# Patient Record
Sex: MALE | Race: BLACK OR AFRICAN AMERICAN | Employment: FULL TIME | URBAN - METROPOLITAN AREA
[De-identification: names, ages, dates, MRNs, and addresses within clinical notes are randomized per-mention and may not be internally consistent; named-entity substitution may affect disease eponyms.]

---

## 2017-02-19 ENCOUNTER — HOSPITAL ENCOUNTER (EMERGENCY)
Age: 27
Discharge: HOME | End: 2017-02-19
Attending: INTERNAL MEDICINE
Payer: SELF-PAY

## 2017-02-19 ENCOUNTER — APPOINTMENT (OUTPATIENT)
Dept: CT IMAGING | Age: 27
End: 2017-02-19
Attending: INTERNAL MEDICINE
Payer: SELF-PAY

## 2017-02-19 VITALS
BODY MASS INDEX: 35 KG/M2 | WEIGHT: 250 LBS | OXYGEN SATURATION: 100 % | TEMPERATURE: 97.1 F | DIASTOLIC BLOOD PRESSURE: 57 MMHG | RESPIRATION RATE: 18 BRPM | HEIGHT: 71 IN | SYSTOLIC BLOOD PRESSURE: 120 MMHG | HEART RATE: 86 BPM

## 2017-02-19 DIAGNOSIS — R10.84 ABDOMINAL PAIN, GENERALIZED: ICD-10-CM

## 2017-02-19 DIAGNOSIS — K29.90 GASTRITIS AND DUODENITIS: ICD-10-CM

## 2017-02-19 DIAGNOSIS — K52.9 GASTROENTERITIS: Primary | ICD-10-CM

## 2017-02-19 LAB
ALBUMIN SERPL BCP-MCNC: 4 G/DL (ref 3.4–5)
ALBUMIN/GLOB SERPL: 0.9 {RATIO} (ref 0.8–1.7)
ALP SERPL-CCNC: 55 U/L (ref 45–117)
ALT SERPL-CCNC: 31 U/L (ref 16–61)
AMPHET UR QL SCN: NEGATIVE
ANION GAP BLD CALC-SCNC: 13 MMOL/L (ref 3–18)
APPEARANCE UR: CLEAR
AST SERPL W P-5'-P-CCNC: 24 U/L (ref 15–37)
BACTERIA URNS QL MICRO: ABNORMAL /HPF
BARBITURATES UR QL SCN: NEGATIVE
BASOPHILS # BLD AUTO: 0 K/UL (ref 0–0.06)
BASOPHILS # BLD: 0 % (ref 0–2)
BENZODIAZ UR QL: NEGATIVE
BILIRUB SERPL-MCNC: 0.5 MG/DL (ref 0.2–1)
BILIRUB UR QL: NEGATIVE
BUN SERPL-MCNC: 8 MG/DL (ref 7–18)
BUN/CREAT SERPL: 8 (ref 12–20)
CALCIUM SERPL-MCNC: 8.7 MG/DL (ref 8.5–10.1)
CANNABINOIDS UR QL SCN: POSITIVE
CHLORIDE SERPL-SCNC: 104 MMOL/L (ref 100–108)
CO2 SERPL-SCNC: 24 MMOL/L (ref 21–32)
COCAINE UR QL SCN: NEGATIVE
COLOR UR: YELLOW
CREAT SERPL-MCNC: 1.05 MG/DL (ref 0.6–1.3)
DIFFERENTIAL METHOD BLD: NORMAL
EOSINOPHIL # BLD: 0 K/UL (ref 0–0.4)
EOSINOPHIL NFR BLD: 0 % (ref 0–5)
EPITH CASTS URNS QL MICRO: NEGATIVE /LPF (ref 0–5)
ERYTHROCYTE [DISTWIDTH] IN BLOOD BY AUTOMATED COUNT: 13.4 % (ref 11.6–14.5)
ETHANOL SERPL-MCNC: 19 MG/DL (ref 0–3)
GLOBULIN SER CALC-MCNC: 4.5 G/DL (ref 2–4)
GLUCOSE SERPL-MCNC: 124 MG/DL (ref 74–99)
GLUCOSE UR STRIP.AUTO-MCNC: NEGATIVE MG/DL
HCT VFR BLD AUTO: 43.6 % (ref 36–48)
HDSCOM,HDSCOM: ABNORMAL
HGB BLD-MCNC: 14.8 G/DL (ref 13–16)
HGB UR QL STRIP: NEGATIVE
KETONES UR QL STRIP.AUTO: NEGATIVE MG/DL
LEUKOCYTE ESTERASE UR QL STRIP.AUTO: NEGATIVE
LIPASE SERPL-CCNC: 129 U/L (ref 73–393)
LYMPHOCYTES # BLD AUTO: 27 % (ref 21–52)
LYMPHOCYTES # BLD: 1.7 K/UL (ref 0.9–3.6)
MCH RBC QN AUTO: 28.6 PG (ref 24–34)
MCHC RBC AUTO-ENTMCNC: 33.9 G/DL (ref 31–37)
MCV RBC AUTO: 84.2 FL (ref 74–97)
METHADONE UR QL: NEGATIVE
MONOCYTES # BLD: 0.5 K/UL (ref 0.05–1.2)
MONOCYTES NFR BLD AUTO: 8 % (ref 3–10)
NEUTS SEG # BLD: 4 K/UL (ref 1.8–8)
NEUTS SEG NFR BLD AUTO: 65 % (ref 40–73)
NITRITE UR QL STRIP.AUTO: NEGATIVE
OPIATES UR QL: NEGATIVE
PCP UR QL: NEGATIVE
PH UR STRIP: >8.5 [PH] (ref 5–8)
PLATELET # BLD AUTO: 264 K/UL (ref 135–420)
PMV BLD AUTO: 10.8 FL (ref 9.2–11.8)
POTASSIUM SERPL-SCNC: 3.7 MMOL/L (ref 3.5–5.5)
PROT SERPL-MCNC: 8.5 G/DL (ref 6.4–8.2)
PROT UR STRIP-MCNC: 30 MG/DL
RBC # BLD AUTO: 5.18 M/UL (ref 4.7–5.5)
RBC #/AREA URNS HPF: ABNORMAL /HPF (ref 0–5)
SODIUM SERPL-SCNC: 141 MMOL/L (ref 136–145)
SP GR UR REFRACTOMETRY: 1.02 (ref 1–1.03)
UROBILINOGEN UR QL STRIP.AUTO: 1 EU/DL (ref 0.2–1)
WBC # BLD AUTO: 6.2 K/UL (ref 4.6–13.2)
WBC URNS QL MICRO: ABNORMAL /HPF (ref 0–5)

## 2017-02-19 PROCEDURE — C9113 INJ PANTOPRAZOLE SODIUM, VIA: HCPCS | Performed by: INTERNAL MEDICINE

## 2017-02-19 PROCEDURE — 74011250636 HC RX REV CODE- 250/636: Performed by: INTERNAL MEDICINE

## 2017-02-19 PROCEDURE — 74177 CT ABD & PELVIS W/CONTRAST: CPT

## 2017-02-19 PROCEDURE — 96361 HYDRATE IV INFUSION ADD-ON: CPT

## 2017-02-19 PROCEDURE — 81001 URINALYSIS AUTO W/SCOPE: CPT | Performed by: INTERNAL MEDICINE

## 2017-02-19 PROCEDURE — 96365 THER/PROPH/DIAG IV INF INIT: CPT

## 2017-02-19 PROCEDURE — 99283 EMERGENCY DEPT VISIT LOW MDM: CPT

## 2017-02-19 PROCEDURE — 96375 TX/PRO/DX INJ NEW DRUG ADDON: CPT

## 2017-02-19 PROCEDURE — 85025 COMPLETE CBC W/AUTO DIFF WBC: CPT | Performed by: INTERNAL MEDICINE

## 2017-02-19 PROCEDURE — 80307 DRUG TEST PRSMV CHEM ANLYZR: CPT | Performed by: INTERNAL MEDICINE

## 2017-02-19 PROCEDURE — 80053 COMPREHEN METABOLIC PANEL: CPT | Performed by: INTERNAL MEDICINE

## 2017-02-19 PROCEDURE — 83690 ASSAY OF LIPASE: CPT | Performed by: INTERNAL MEDICINE

## 2017-02-19 PROCEDURE — 74011636320 HC RX REV CODE- 636/320: Performed by: INTERNAL MEDICINE

## 2017-02-19 PROCEDURE — 74011000258 HC RX REV CODE- 258: Performed by: INTERNAL MEDICINE

## 2017-02-19 RX ORDER — ONDANSETRON 2 MG/ML
4 INJECTION INTRAMUSCULAR; INTRAVENOUS
Status: COMPLETED | OUTPATIENT
Start: 2017-02-19 | End: 2017-02-19

## 2017-02-19 RX ORDER — PANTOPRAZOLE SODIUM 40 MG/1
40 TABLET, DELAYED RELEASE ORAL DAILY
Qty: 20 TAB | Refills: 0 | Status: SHIPPED | OUTPATIENT
Start: 2017-02-19 | End: 2017-03-11

## 2017-02-19 RX ORDER — PROMETHAZINE HYDROCHLORIDE 25 MG/1
25 TABLET ORAL
Qty: 12 TAB | Refills: 0 | Status: SHIPPED | OUTPATIENT
Start: 2017-02-19 | End: 2017-03-30

## 2017-02-19 RX ORDER — ACETAMINOPHEN 10 MG/ML
1000 INJECTION, SOLUTION INTRAVENOUS
Status: COMPLETED | OUTPATIENT
Start: 2017-02-19 | End: 2017-02-19

## 2017-02-19 RX ORDER — PANTOPRAZOLE SODIUM 40 MG/10ML
40 INJECTION, POWDER, LYOPHILIZED, FOR SOLUTION INTRAVENOUS
Status: COMPLETED | OUTPATIENT
Start: 2017-02-19 | End: 2017-02-19

## 2017-02-19 RX ADMIN — PANTOPRAZOLE SODIUM 40 MG: 40 INJECTION, POWDER, FOR SOLUTION INTRAVENOUS at 09:38

## 2017-02-19 RX ADMIN — ONDANSETRON HYDROCHLORIDE 4 MG: 2 INJECTION INTRAMUSCULAR; INTRAVENOUS at 08:36

## 2017-02-19 RX ADMIN — ACETAMINOPHEN 1000 MG: 10 INJECTION, SOLUTION INTRAVENOUS at 09:38

## 2017-02-19 RX ADMIN — IOPAMIDOL 100 ML: 612 INJECTION, SOLUTION INTRAVENOUS at 09:32

## 2017-02-19 RX ADMIN — SODIUM CHLORIDE 1000 ML: 900 INJECTION, SOLUTION INTRAVENOUS at 08:30

## 2017-02-19 RX ADMIN — PROMETHAZINE HYDROCHLORIDE 12.5 MG: 25 INJECTION, SOLUTION INTRAMUSCULAR; INTRAVENOUS at 09:39

## 2017-02-19 NOTE — ED PROVIDER NOTES
[address]  EMERGENCY DEPARTMENT HISTORY AND PHYSICAL EXAM       Date: 2/19/2017   Patient Name: Nelson Abdalla   YOB: 1990  Medical Record Number: [id number]    History of Presenting Illness     Chief Complaint   Patient presents with    Vomiting    Abdominal Pain    Diarrhea        History Provided By:  patient    Additional History:   8:52 AM   Nelson Abdalla is a 31 y.o. male h/o abdominal pain, gastritis, who presents to the emergency department C/O abd pain, almost diffuse, w/o radiation, severe,  onset 6 hours ago. Associated sx's include back pain, congestion, nausea, vomiting and bright-red bloody tinged diarrhea for last few days. Pt vomited 17x this AM. Pt's vomit consist of mostly mucus. Pt consumed 2-3 shots of alcohol last night. Pt has not had colostomy done. Pt denies CP, f/c, back pain, ha, dyspnea, smoking cigarettes, use of recreational drugs, any surgeries and allergies. Primary Care Provider: None   Specialist:    Past History     Past Medical History:   Past Medical History   Diagnosis Date    Asthma         Past Surgical History:   History reviewed. No pertinent past surgical history. Family History:   History reviewed. No pertinent family history. Social History:   Social History   Substance Use Topics    Smoking status: Never Smoker    Smokeless tobacco: None    Alcohol use Yes      Comment: socially         Allergies:   No Known Allergies     Review of Systems   Review of Systems   HENT: Positive for congestion. Cardiovascular: Negative for chest pain. Gastrointestinal: Positive for abdominal pain, blood in stool, nausea and vomiting. Musculoskeletal: Positive for back pain. All other systems reviewed and are negative.       Physical Exam  Vitals:    02/19/17 0809   BP: 120/57   Pulse: 86   Resp: 18   Temp: 97.1 °F (36.2 °C)   SpO2: 100%   Weight: 113.4 kg (250 lb)   Height: 5' 11\" (1.803 m)       Physical Exam Constitutional: He is oriented to person, place, and time. He appears well-developed and well-nourished. HENT:   Head: Normocephalic and atraumatic. Right Ear: External ear normal.   Left Ear: External ear normal.   Nose: Nose normal.   Mouth/Throat: Oropharyngeal exudate (minimal) and posterior oropharyngeal erythema (mild) present. Nasal erythema   Eyes: Conjunctivae and EOM are normal. Pupils are equal, round, and reactive to light. Neck: Normal range of motion. Neck supple. No JVD present. No tracheal deviation present. No thyromegaly present. Cardiovascular: Normal rate, regular rhythm, normal heart sounds and intact distal pulses. Pulmonary/Chest: Effort normal and breath sounds normal.   Abdominal: Soft. Bowel sounds are normal. He exhibits no distension and no mass. There is tenderness in the right upper quadrant, right lower quadrant and epigastric area. There is no rebound and no guarding. No HSM   Musculoskeletal: Normal range of motion. He exhibits no edema or tenderness. Lymphadenopathy:     He has no cervical adenopathy. Neurological: He is alert and oriented to person, place, and time. He has normal reflexes. No cranial nerve deficit. He exhibits normal muscle tone. Coordination normal.   No focal weakness   Skin: Skin is warm and dry. Psychiatric: He has a normal mood and affect. His behavior is normal. Thought content normal.   Nursing note and vitals reviewed.     Diagnostic Study Results     Labs -      Recent Results (from the past 12 hour(s))   URINALYSIS W/ RFLX MICROSCOPIC    Collection Time: 02/19/17  8:15 AM   Result Value Ref Range    Color YELLOW      Appearance CLEAR      Specific gravity 1.022 1.005 - 1.030      pH (UA) >8.5 (H) 5.0 - 8.0    Protein 30 (A) NEG mg/dL    Glucose NEGATIVE  NEG mg/dL    Ketone NEGATIVE  NEG mg/dL    Bilirubin NEGATIVE  NEG      Blood NEGATIVE  NEG      Urobilinogen 1.0 0.2 - 1.0 EU/dL    Nitrites NEGATIVE  NEG      Leukocyte Esterase NEGATIVE  NEG     URINE MICROSCOPIC ONLY    Collection Time: 02/19/17  8:15 AM   Result Value Ref Range    WBC 0 to 3 0 - 5 /hpf    RBC 0 to 3 0 - 5 /hpf    Epithelial cells NEGATIVE  0 - 5 /lpf    Bacteria FEW (A) NEG /hpf   DRUG SCREEN, URINE    Collection Time: 02/19/17  8:15 AM   Result Value Ref Range    BENZODIAZEPINE NEGATIVE  NEG      BARBITURATES NEGATIVE  NEG      THC (TH-CANNABINOL) POSITIVE (A) NEG      OPIATES NEGATIVE  NEG      PCP(PHENCYCLIDINE) NEGATIVE  NEG      COCAINE NEGATIVE  NEG      AMPHETAMINE NEGATIVE  NEG      METHADONE NEGATIVE  NEG      HDSCOM (NOTE)    CBC WITH AUTOMATED DIFF    Collection Time: 02/19/17  8:20 AM   Result Value Ref Range    WBC 6.2 4.6 - 13.2 K/uL    RBC 5.18 4.70 - 5.50 M/uL    HGB 14.8 13.0 - 16.0 g/dL    HCT 43.6 36.0 - 48.0 %    MCV 84.2 74.0 - 97.0 FL    MCH 28.6 24.0 - 34.0 PG    MCHC 33.9 31.0 - 37.0 g/dL    RDW 13.4 11.6 - 14.5 %    PLATELET 886 599 - 862 K/uL    MPV 10.8 9.2 - 11.8 FL    NEUTROPHILS 65 40 - 73 %    LYMPHOCYTES 27 21 - 52 %    MONOCYTES 8 3 - 10 %    EOSINOPHILS 0 0 - 5 %    BASOPHILS 0 0 - 2 %    ABS. NEUTROPHILS 4.0 1.8 - 8.0 K/UL    ABS. LYMPHOCYTES 1.7 0.9 - 3.6 K/UL    ABS. MONOCYTES 0.5 0.05 - 1.2 K/UL    ABS. EOSINOPHILS 0.0 0.0 - 0.4 K/UL    ABS. BASOPHILS 0.0 0.0 - 0.06 K/UL    DF AUTOMATED     METABOLIC PANEL, COMPREHENSIVE    Collection Time: 02/19/17  8:20 AM   Result Value Ref Range    Sodium 141 136 - 145 mmol/L    Potassium 3.7 3.5 - 5.5 mmol/L    Chloride 104 100 - 108 mmol/L    CO2 24 21 - 32 mmol/L    Anion gap 13 3.0 - 18 mmol/L    Glucose 124 (H) 74 - 99 mg/dL    BUN 8 7.0 - 18 MG/DL    Creatinine 1.05 0.6 - 1.3 MG/DL    BUN/Creatinine ratio 8 (L) 12 - 20      GFR est AA >60 >60 ml/min/1.73m2    GFR est non-AA >60 >60 ml/min/1.73m2    Calcium 8.7 8.5 - 10.1 MG/DL    Bilirubin, total 0.5 0.2 - 1.0 MG/DL    ALT (SGPT) 31 16 - 61 U/L    AST (SGOT) 24 15 - 37 U/L    Alk.  phosphatase 55 45 - 117 U/L    Protein, total 8.5 (H) 6.4 - 8.2 g/dL    Albumin 4.0 3.4 - 5.0 g/dL    Globulin 4.5 (H) 2.0 - 4.0 g/dL    A-G Ratio 0.9 0.8 - 1.7     ETHYL ALCOHOL    Collection Time: 02/19/17  8:20 AM   Result Value Ref Range    ALCOHOL(ETHYL),SERUM 19 (H) 0 - 3 MG/DL   LIPASE    Collection Time: 02/19/17  8:45 AM   Result Value Ref Range    Lipase 129 73 - 393 U/L       Radiologic Studies -  CT ABD PELV W CONT   Final Result   No acute findings within the abdomen or pelvis to explain the patient's  abdominal pain  As read by the radiologist.         Medical Decision Making   I am the first provider for this patient. I reviewed the vital signs, available nursing notes, past medical history, past surgical history, family history and social history. Vital Signs-Reviewed the patient's vital signs. Patient Vitals for the past 12 hrs:   Temp Pulse Resp BP SpO2   02/19/17 0809 97.1 °F (36.2 °C) 86 18 120/57 100 %       Pulse Oximetry Analysis - Normal 100% on RA     Medications Given in the ED:  Medications   ondansetron (ZOFRAN) injection 4 mg (4 mg IntraVENous Given 2/19/17 0836)   sodium chloride 0.9 % bolus infusion 1,000 mL (0 mL IntraVENous IV Completed 2/19/17 0939)   promethazine (PHENERGAN) 12.5 mg in 0.9% sodium chloride 50 mL IVPB (0 mg IntraVENous IV Completed 2/19/17 1023)   acetaminophen (OFIRMEV) infusion 1,000 mg (1,000 mg IntraVENous Given 2/19/17 0938)   pantoprazole (PROTONIX) injection 40 mg (40 mg IntraVENous Given 2/19/17 0938)   iopamidol (ISOVUE 300) 61 % contrast injection  mL (100 mL IntraVENous Given 2/19/17 0932)        PROGRESS NOTE:  8:52 AM   Initial assessment performed. Pt denies rectal exam.     Discharge Note:  10:47 AM   Pt has been reexamined. Patient has no new complaints, changes; physical findings shows improvement with resting comfortably, no pain, abd soft, nt, nd, nabs, no guarding; Heart/lungs no change wnl. Care plan outlined and precautions discussed. Results were reviewed with the patient.  All medications were reviewed with the patient; will d/c home with phenergan and protonix . All of pt's questions and concerns were addressed. Patient was instructed and agrees to follow up with GI, as well as to return to the ED upon further deterioration. Discussed cessation of alcohol. Patient is ready to go home. Diagnosis   Clinical Impression:   1. Gastroenteritis    2. Gastritis and duodenitis    3. Abdominal pain, generalized         Discussion:    Follow-up Information     Follow up With Details Comments Contact Info    Lorene Vallecillo MD Schedule an appointment as soon as possible for a visit in 2 days  [address] [place] [address] [address] [facility] EMERGENCY DEPT  As needed, If symptoms worsen [address]  [phone]          Current Discharge Medication List      START taking these medications    Details   promethazine (PHENERGAN) 25 mg tablet Take 1 Tab by mouth every six (6) hours as needed. Qty: 12 Tab, Refills: 0      pantoprazole (PROTONIX) 40 mg tablet Take 1 Tab by mouth daily for 20 days. Qty: 20 Tab, Refills: 0             _______________________________   Attestations: This note is prepared by Beena West , acting as a Scribe for Werner Balderas MD  on 8:51 AM on 2/19/2017 . Werner Balderas MD : The scribe's documentation has been prepared under my direction and personally reviewed by me in its entirety.   _______________________________

## 2017-02-19 NOTE — DISCHARGE INSTRUCTIONS
Abdominal Pain: Care Instructions  Your Care Instructions    Abdominal pain has many possible causes. Some aren't serious and get better on their own in a few days. Others need more testing and treatment. If your pain continues or gets worse, you need to be rechecked and may need more tests to find out what is wrong. You may need surgery to correct the problem. Don't ignore new symptoms, such as fever, nausea and vomiting, urination problems, pain that gets worse, and dizziness. These may be signs of a more serious problem. Your doctor may have recommended a follow-up visit in the next 8 to 12 hours. If you are not getting better, you may need more tests or treatment. The doctor has checked you carefully, but problems can develop later. If you notice any problems or new symptoms, get medical treatment right away. Follow-up care is a key part of your treatment and safety. Be sure to make and go to all appointments, and call your doctor if you are having problems. It's also a good idea to know your test results and keep a list of the medicines you take. How can you care for yourself at home? · Rest until you feel better. · To prevent dehydration, drink plenty of fluids, enough so that your urine is light yellow or clear like water. Choose water and other caffeine-free clear liquids until you feel better. If you have kidney, heart, or liver disease and have to limit fluids, talk with your doctor before you increase the amount of fluids you drink. · If your stomach is upset, eat mild foods, such as rice, dry toast or crackers, bananas, and applesauce. Try eating several small meals instead of two or three large ones. · Wait until 48 hours after all symptoms have gone away before you have spicy foods, alcohol, and drinks that contain caffeine. · Do not eat foods that are high in fat. · Avoid anti-inflammatory medicines such as aspirin, ibuprofen (Advil, Motrin), and naproxen (Aleve).  These can cause stomach upset. Talk to your doctor if you take daily aspirin for another health problem. When should you call for help? Call 911 anytime you think you may need emergency care. For example, call if:  · You passed out (lost consciousness). · You pass maroon or very bloody stools. · You vomit blood or what looks like coffee grounds. · You have new, severe belly pain. Call your doctor now or seek immediate medical care if:  · Your pain gets worse, especially if it becomes focused in one area of your belly. · You have a new or higher fever. · Your stools are black and look like tar, or they have streaks of blood. · You have unexpected vaginal bleeding. · You have symptoms of a urinary tract infection. These may include:  ¨ Pain when you urinate. ¨ Urinating more often than usual.  ¨ Blood in your urine. · You are dizzy or lightheaded, or you feel like you may faint. Watch closely for changes in your health, and be sure to contact your doctor if:  · You are not getting better after 1 day (24 hours). Where can you learn more? Go to http://glennNetgenmayra.info/. Enter B640 in the search box to learn more about \"Abdominal Pain: Care Instructions. \"  Current as of: May 27, 2016  Content Version: 11.1  © 1608-8973 [employer]. Care instructions adapted under license by [employer] (which disclaims liability or warranty for this information). If you have questions about a medical condition or this instruction, always ask your healthcare professional. [address] any warranty or liability for your use of this information. Gastritis: Care Instructions  Your Care Instructions    Gastritis is a sore and upset stomach. It happens when something irritates the stomach lining. Many things can cause it. These include an infection such as the flu or something you ate or drank. Medicines or a sore on the lining of the stomach (ulcer) also can cause it. Your belly may bloat and ache. You may belch, vomit, and feel sick to your stomach. You should be able to relieve the problem by taking medicine. And it may help to change your diet. If gastritis lasts, your doctor may prescribe medicine. Follow-up care is a key part of your treatment and safety. Be sure to make and go to all appointments, and call your doctor if you are having problems. It's also a good idea to know your test results and keep a list of the medicines you take. How can you care for yourself at home? · If your doctor prescribed antibiotics, take them as directed. Do not stop taking them just because you feel better. You need to take the full course of antibiotics. · Be safe with medicines. If your doctor prescribed medicine to decrease stomach acid, take it as directed. Call your doctor if you think you are having a problem with your medicine. · Do not take any other medicine, including over-the-counter pain relievers, without talking to your doctor first.  · If your doctor recommends over-the-counter medicine to reduce stomach acid, such as Pepcid AC, Prilosec, Tagamet HB, or Zantac 75, follow the directions on the label. · Drink plenty of fluids (enough so that your urine is light yellow or clear like water) to prevent dehydration. Choose water and other caffeine-free clear liquids. If you have kidney, heart, or liver disease and have to limit fluids, talk with your doctor before you increase the amount of fluids you drink. · Limit how much alcohol you drink. · Avoid coffee, tea, cola drinks, chocolate, and other foods with caffeine. They increase stomach acid. When should you call for help? Call 911 anytime you think you may need emergency care. For example, call if:  · You vomit blood or what looks like coffee grounds. · You pass maroon or very bloody stools.   Call your doctor now or seek immediate medical care if:  · You start breathing fast and have not produced urine in the last 8 hours.  · You cannot keep fluids down. Watch closely for changes in your health, and be sure to contact your doctor if:  · You do not get better as expected. Where can you learn more? Go to http://glenn-mayra.info/. Enter [id number] in the search box to learn more about \"Gastritis: Care Instructions. \"  Current as of: August 9, 2016  Content Version: 11.1  © 2019-3639 [employer]. Care instructions adapted under license by [employer] (which disclaims liability or warranty for this information). If you have questions about a medical condition or this instruction, always ask your healthcare professional. [place] [address] any warranty or liability for your use of this information. Gastroenteritis: Care Instructions  Your Care Instructions  Gastroenteritis is an illness that may cause nausea, vomiting, and diarrhea. It is sometimes called \"stomach flu. \" It can be caused by bacteria or a virus. You will probably begin to feel better in 1 to 2 days. In the meantime, get plenty of rest and make sure you do not become dehydrated. Dehydration occurs when your body loses too much fluid. Follow-up care is a key part of your treatment and safety. Be sure to make and go to all appointments, and call your doctor if you are having problems. Its also a good idea to know your test results and keep a list of the medicines you take. How can you care for yourself at home? · If your doctor prescribed antibiotics, take them as directed. Do not stop taking them just because you feel better. You need to take the full course of antibiotics. · Drink plenty of fluids to prevent dehydration, enough so that your urine is light yellow or clear like water. Choose water and other caffeine-free clear liquids until you feel better. If you have kidney, heart, or liver disease and have to limit fluids, talk with your doctor before you increase your fluid intake.   · Drink fluids slowly, in frequent, small amounts, because drinking too much too fast can cause vomiting. · Begin eating mild foods, such as dry toast, yogurt, applesauce, bananas, and rice. Avoid spicy, hot, or high-fat foods, and do not drink alcohol or caffeine for a day or two. Do not drink milk or eat ice cream until you are feeling better. How to prevent gastroenteritis  · Keep hot foods hot and cold foods cold. · Do not eat meats, dressings, salads, or other foods that have been kept at room temperature for more than 2 hours. · Use a thermometer to check your refrigerator. It should be between 34°F and 40°F.  · Defrost meats in the refrigerator or microwave, not on the kitchen counter. · Keep your hands and your kitchen clean. Wash your hands, cutting boards, and countertops with hot soapy water frequently. · Cook meat until it is well done. · Do not eat raw eggs or uncooked sauces made with raw eggs. · Do not take chances. If food looks or tastes spoiled, throw it out. When should you call for help? Call 911 anytime you think you may need emergency care. For example, call if:  · You vomit blood or what looks like coffee grounds. · You passed out (lost consciousness). · You pass maroon or very bloody stools. Call your doctor now or seek immediate medical care if:  · You have severe belly pain. · You have signs of needing more fluids. You have sunken eyes, a dry mouth, and pass only a little dark urine. · You feel like you are going to faint. · You have increased belly pain that does not go away in 1 to 2 days. · You have new or increased nausea, or you are vomiting. · You have a new or higher fever. · Your stools are black and tarlike or have streaks of blood. Watch closely for changes in your health, and be sure to contact your doctor if:  · You are dizzy or lightheaded. · You urinate less than usual, or your urine is dark yellow or brown. · You do not feel better with each day that goes by.   Where can you learn more? Go to http://glenn-mayra.info/. Enter N142 in the search box to learn more about \"Gastroenteritis: Care Instructions. \"  Current as of: May 24, 2016  Content Version: 11.1  © 4767-3906 [employer]. Care instructions adapted under license by [employer] (which disclaims liability or warranty for this information). If you have questions about a medical condition or this instruction, always ask your healthcare professional. [place] [address] any warranty or liability for your use of this information.

## 2017-02-19 NOTE — ED TRIAGE NOTES
Pt states n/v/d onset today with abd pain, pt states unable to hold any fluids down  Sepsis Screening completed    (  )Patient meets SIRS criteria. ( x )Patient does not meet SIRS criteria.       SIRS Criteria is achieved when two or more of the following are present   Temperature < 96.8°F (36°C) or > 100.9°F (38.3°C)   Heart Rate > 90 beats per minute   Respiratory Rate > 20 breaths per minute   WBC count > 12,000 or <4,000 or > 10% bands

## 2017-03-30 ENCOUNTER — HOSPITAL ENCOUNTER (EMERGENCY)
Age: 27
Discharge: HOME | End: 2017-03-30
Attending: EMERGENCY MEDICINE
Payer: SELF-PAY

## 2017-03-30 VITALS
RESPIRATION RATE: 20 BRPM | HEIGHT: 71 IN | WEIGHT: 250 LBS | BODY MASS INDEX: 35 KG/M2 | OXYGEN SATURATION: 100 % | HEART RATE: 98 BPM | TEMPERATURE: 97.9 F | DIASTOLIC BLOOD PRESSURE: 84 MMHG | SYSTOLIC BLOOD PRESSURE: 167 MMHG

## 2017-03-30 DIAGNOSIS — R80.9 PROTEINURIA: ICD-10-CM

## 2017-03-30 DIAGNOSIS — R36.9 PENILE DISCHARGE: Primary | ICD-10-CM

## 2017-03-30 LAB
APPEARANCE UR: CLEAR
BACTERIA URNS QL MICRO: NEGATIVE /HPF
BILIRUB UR QL: NEGATIVE
COLOR UR: YELLOW
EPITH CASTS URNS QL MICRO: ABNORMAL /LPF (ref 0–5)
GLUCOSE UR STRIP.AUTO-MCNC: NEGATIVE MG/DL
HGB UR QL STRIP: ABNORMAL
KETONES UR QL STRIP.AUTO: ABNORMAL MG/DL
LEUKOCYTE ESTERASE UR QL STRIP.AUTO: NEGATIVE
MUCOUS THREADS URNS QL MICRO: ABNORMAL /LPF
NITRITE UR QL STRIP.AUTO: NEGATIVE
PH UR STRIP: 6.5 [PH] (ref 5–8)
PROT UR STRIP-MCNC: 30 MG/DL
RBC #/AREA URNS HPF: ABNORMAL /HPF (ref 0–5)
SP GR UR REFRACTOMETRY: >1.03 (ref 1–1.03)
UROBILINOGEN UR QL STRIP.AUTO: 1 EU/DL (ref 0.2–1)
WBC URNS QL MICRO: ABNORMAL /HPF (ref 0–5)

## 2017-03-30 PROCEDURE — 87491 CHLMYD TRACH DNA AMP PROBE: CPT | Performed by: PHYSICIAN ASSISTANT

## 2017-03-30 PROCEDURE — 96372 THER/PROPH/DIAG INJ SC/IM: CPT

## 2017-03-30 PROCEDURE — 74011250636 HC RX REV CODE- 250/636: Performed by: PHYSICIAN ASSISTANT

## 2017-03-30 PROCEDURE — 99283 EMERGENCY DEPT VISIT LOW MDM: CPT

## 2017-03-30 PROCEDURE — 81001 URINALYSIS AUTO W/SCOPE: CPT | Performed by: EMERGENCY MEDICINE

## 2017-03-30 PROCEDURE — 74011250637 HC RX REV CODE- 250/637: Performed by: PHYSICIAN ASSISTANT

## 2017-03-30 PROCEDURE — 74011000250 HC RX REV CODE- 250: Performed by: PHYSICIAN ASSISTANT

## 2017-03-30 RX ORDER — AZITHROMYCIN 250 MG/1
1000 TABLET, FILM COATED ORAL
Status: COMPLETED | OUTPATIENT
Start: 2017-03-30 | End: 2017-03-30

## 2017-03-30 RX ORDER — AZITHROMYCIN 250 MG/1
1000 TABLET, FILM COATED ORAL DAILY
Status: DISCONTINUED | OUTPATIENT
Start: 2017-03-31 | End: 2017-03-30

## 2017-03-30 RX ADMIN — LIDOCAINE HYDROCHLORIDE 250 MG: 10 INJECTION, SOLUTION INFILTRATION; PERINEURAL at 18:10

## 2017-03-30 RX ADMIN — AZITHROMYCIN 1000 MG: 250 TABLET, FILM COATED ORAL at 18:09

## 2017-03-30 NOTE — ED PROVIDER NOTES
HPI Comments: 5:45 PM    Jody Miller is a 31 y.o. male presenting to the ED C/O penile discharge that began today ~4 hours ago. Pt has no pain. Associated Sx include abdominal pain. Pt reports two recent episodes of condoms breaking, but has not been notified by his sexual partners of STDs. Pt denies dysuria, testicular swelling or pain, back pain, fever, and any other symptoms or complaints. Written by BRUNILDA Guadalupe, as dictated by Arnaldo Trotter PA-C         Patient is a 31 y.o. male presenting with penile problem. The history is provided by the patient. No  was used. Penis Injury   This is a new problem. The current episode started 3 to 5 hours ago. Primary symptoms include penile discharge. Pertinent negatives include no dysuria, no penile pain and no scrotal pain. The discharge is white. Associated symptoms include abdominal pain. There has been no fever. Sexual activity: sexually active. Condom Use: Reports condoms broke   It is unknown if his sexual partner displays symptoms of an STD. Past Medical History:   Diagnosis Date    Asthma        History reviewed. No pertinent surgical history. History reviewed. No pertinent family history. Social History     Social History    Marital status: SINGLE     Spouse name: N/A    Number of children: N/A    Years of education: N/A     Occupational History    Not on file. Social History Main Topics    Smoking status: Never Smoker    Smokeless tobacco: Not on file    Alcohol use Yes      Comment: socially     Drug use: No    Sexual activity: Not on file     Other Topics Concern    Not on file     Social History Narrative         ALLERGIES: Review of patient's allergies indicates no known allergies. Review of Systems   Constitutional: Negative for fever. Respiratory: Cough: Productive of blood. Gastrointestinal: Positive for abdominal pain.    Genitourinary: Positive for discharge and penile discharge. Negative for dysuria, penile pain, scrotal swelling and testicular pain. Musculoskeletal: Negative for back pain. All other systems reviewed and are negative. Vitals:    03/30/17 1716   BP: 167/84   Pulse: 98   Resp: 20   Temp: 97.9 °F (36.6 °C)   SpO2: 100%   Weight: 113.4 kg (250 lb)   Height: 5' 11\" (1.803 m)            Physical Exam   Constitutional: He is oriented to person, place, and time. He appears well-developed and well-nourished. No distress. Alert, non toxic, appears anxious    HENT:   Head: Normocephalic and atraumatic. Neck: Normal range of motion. Neck supple. Cardiovascular: Normal rate, regular rhythm, normal heart sounds and intact distal pulses. No murmur heard. Pulmonary/Chest: Effort normal and breath sounds normal. No respiratory distress. He has no wheezes. He has no rales. Abdominal: Soft. Bowel sounds are normal. There is no tenderness. Genitourinary: Testes normal and penis normal. Cremasteric reflex is present. Uncircumcised. No penile erythema or penile tenderness. No discharge found. Genitourinary Comments: Genital exam performed with male tech present    Neurological: He is alert and oriented to person, place, and time. Skin: Skin is warm and dry. No rash noted. No erythema. No pallor. Psychiatric: He has a normal mood and affect. Judgment normal.   Nursing note and vitals reviewed. RESULTS:    No orders to display        Labs Reviewed   URINALYSIS W/ RFLX MICROSCOPIC - Abnormal; Notable for the following:        Result Value    Specific gravity >1.030 (*)     Protein 30 (*)     Ketone TRACE (*)     Blood SMALL (*)     All other components within normal limits   URINE MICROSCOPIC ONLY - Abnormal; Notable for the following:     Mucus FEW (*)     All other components within normal limits   CHLAMYDIA/NEISSERIA AMPLIFICATION       No results found for this or any previous visit (from the past 12 hour(s)).    MDM  Number of Diagnoses or Management Options  Penile discharge:   Proteinuria:   Diagnosis management comments: STI, UTI        Amount and/or Complexity of Data Reviewed  Clinical lab tests: reviewed and ordered      ED Course     Medications   cefTRIAXone (ROCEPHIN) 250 mg in lidocaine (XYLOCAINE) 10 mg/mL (1 %) IM syringe (250 mg IntraMUSCular Given 3/30/17 1810)   azithromycin (ZITHROMAX) tablet 1,000 mg (1,000 mg Oral Given 3/30/17 1809)      Procedures       PROGRESS NOTE:  5:45 PM  Initial assessment performed. DISCUSSION:  No signs of infection in urine. Empiric tx with Azithromycin and rocephin given in ED. Pending GC/Chlamydia culture     DISCHARGE NOTE:   6:35 PM  Pt has been reexamined. Patient has no new complaints, changes, or physical findings. Care plan outlined and precautions discussed. Results were reviewed with the patient. All medications were reviewed with the patient; will d/c home with. All of pt's questions and concerns were addressed. Patient was instructed and agrees to follow up with PCP, as well as to return to the ED upon further deterioration. Patient is ready to go home. CLINICAL IMPRESSION:    1. Penile discharge    2. Proteinuria        PLAN: DISCHARGE HOME    Follow-up Information     Follow up With Details Comments [address] Schedule an appointment as soon as possible for a visit for further STD testing and recheck  [address] [place] [address]    THE [facility] EMERGENCY DEPT  If symptoms worsen [address] [place] [address]  [phone]          There are no discharge medications for this patient. ATTESTATION:  This note is prepared by Bassam Douglass, acting as Scribe for Julio Hernandez PA-C. Julio Hernandez PA-C: The scribe's documentation has been prepared under my direction and personally reviewed by me in its entirety.  I confirm that the note above accurately reflects all work, treatment, procedures, and medical decision making performed by me.

## 2017-03-30 NOTE — DISCHARGE INSTRUCTIONS
Proteinuria: Care Instructions  Your Care Instructions  Proteinuria means that you have too much protein in your urine. This is usually caused by a kidney problem. Your body's blood passes through your kidneys. Normally, the kidneys remove waste from the blood. The waste then leaves the body in the urine. But they don't let protein leave the body. If your kidneys are not working well, they let too much protein get in your urine. A high level of protein in your urine is a sign that something is harming your kidneys. It may be puzzling to find out that you have a problem with your kidneys, because you probably don't feel different. Your doctor may do more tests to find out what is causing the protein to get into your urine. Possible causes include an infection or a medical condition, such as diabetes or heart disease. You may need regular urine tests in the future. You may be able to reduce the protein in your urine by getting exercise, eating a healthy diet, and taking medicine. Follow-up care is a key part of your treatment and safety. Be sure to make and go to all appointments, and call your doctor if you are having problems. It's also a good idea to know your test results and keep a list of the medicines you take. How can you care for yourself at home? · Take your medicines exactly as prescribed. Call your doctor if you think you are having a problem with your medicine. You will get more details on the specific medicines your doctor prescribes. · Work with your doctor and dietitian to set up a diet that will be healthy for you. You may need to:  ¨ Eat a heart-healthy diet to keep the fat (cholesterol) in your blood under control. ¨ Eat a low-salt diet to keep your blood pressure at normal levels. ¨ Limit protein in your foods. ¨ Limit the amount of fluids you drink. · If you have diabetes, try to keep your blood sugar at normal or near-normal levels. ¨ Follow your diet. Eat a variety of foods. Spread carbohydrate throughout your meals. ¨ If your doctor recommends it, get more exercise. Walking is a good choice. Bit by bit, increase the amount you walk every day. Try for at least 30 minutes on most days of the week. ¨ Check your blood sugar as often as your doctor recommends. · Do not smoke. Smoking raises your risk of many health problems, including kidney damage. If you need help quitting, talk to your doctor about stop-smoking programs and medicines. These can increase your chances of quitting for good. · Do not take ibuprofen, naproxen, acetaminophen (Tylenol), or similar medicines, unless your doctor tells you to. These medicines may make kidney problems worse. · Check with your doctor before you take any herbal supplements or over-the-counter medicines. When should you call for help? Call 911 anytime you think you may need emergency care. For example, call if:  · You passed out (lost consciousness). Call your doctor now or seek immediate medical care if:  · You have swelling in your hands or feet. · You are dizzy or lightheaded, or you feel like you may faint. · You have an unusual weight gain. Watch closely for changes in your health, and be sure to contact your doctor if:  · You do not get better as expected. Where can you learn more? Go to http://glenn-mayra.info/. Enter E980 in the search box to learn more about \"Proteinuria: Care Instructions. \"  Current as of: November 20, 2015  Content Version: 11.2  © 2504-6292 [employer]. Care instructions adapted under license by [employer] (which disclaims liability or warranty for this information). If you have questions about a medical condition or this instruction, always ask your healthcare professional. [place] [address] any warranty or liability for your use of this information.

## 2017-04-03 LAB
C TRACH RRNA SPEC QL NAA+PROBE: NEGATIVE
N GONORRHOEA RRNA SPEC QL NAA+PROBE: NEGATIVE
SPECIMEN SOURCE: NORMAL

## 2017-08-05 ENCOUNTER — HOSPITAL ENCOUNTER (EMERGENCY)
Age: 27
Discharge: HOME | End: 2017-08-05
Attending: EMERGENCY MEDICINE
Payer: SELF-PAY

## 2017-08-05 VITALS
TEMPERATURE: 98.1 F | BODY MASS INDEX: 35 KG/M2 | WEIGHT: 250 LBS | RESPIRATION RATE: 16 BRPM | HEIGHT: 71 IN | SYSTOLIC BLOOD PRESSURE: 114 MMHG | OXYGEN SATURATION: 99 % | DIASTOLIC BLOOD PRESSURE: 61 MMHG | HEART RATE: 61 BPM

## 2017-08-05 DIAGNOSIS — L72.0 EIC (EPIDERMAL INCLUSION CYST): ICD-10-CM

## 2017-08-05 DIAGNOSIS — K29.90 GASTRITIS AND DUODENITIS: Primary | ICD-10-CM

## 2017-08-05 DIAGNOSIS — R11.2 NAUSEA AND VOMITING, INTRACTABILITY OF VOMITING NOT SPECIFIED, UNSPECIFIED VOMITING TYPE: ICD-10-CM

## 2017-08-05 LAB
ALBUMIN SERPL BCP-MCNC: 3.6 G/DL (ref 3.4–5)
ALBUMIN/GLOB SERPL: 0.9 {RATIO} (ref 0.8–1.7)
ALP SERPL-CCNC: 72 U/L (ref 45–117)
ALT SERPL-CCNC: 27 U/L (ref 16–61)
ANION GAP BLD CALC-SCNC: 11 MMOL/L (ref 3–18)
AST SERPL W P-5'-P-CCNC: 17 U/L (ref 15–37)
BASOPHILS # BLD AUTO: 0 K/UL (ref 0–0.06)
BASOPHILS # BLD: 0 % (ref 0–2)
BILIRUB SERPL-MCNC: 0.3 MG/DL (ref 0.2–1)
BUN SERPL-MCNC: 10 MG/DL (ref 7–18)
BUN/CREAT SERPL: 8 (ref 12–20)
CALCIUM SERPL-MCNC: 8.6 MG/DL (ref 8.5–10.1)
CHLORIDE SERPL-SCNC: 104 MMOL/L (ref 100–108)
CO2 SERPL-SCNC: 28 MMOL/L (ref 21–32)
CREAT SERPL-MCNC: 1.18 MG/DL (ref 0.6–1.3)
DIFFERENTIAL METHOD BLD: ABNORMAL
EOSINOPHIL # BLD: 0.1 K/UL (ref 0–0.4)
EOSINOPHIL NFR BLD: 3 % (ref 0–5)
ERYTHROCYTE [DISTWIDTH] IN BLOOD BY AUTOMATED COUNT: 13.6 % (ref 11.6–14.5)
GLOBULIN SER CALC-MCNC: 4 G/DL (ref 2–4)
GLUCOSE SERPL-MCNC: 99 MG/DL (ref 74–99)
HCT VFR BLD AUTO: 39.3 % (ref 36–48)
HGB BLD-MCNC: 13.3 G/DL (ref 13–16)
LIPASE SERPL-CCNC: 276 U/L (ref 73–393)
LYMPHOCYTES # BLD AUTO: 48 % (ref 21–52)
LYMPHOCYTES # BLD: 2.5 K/UL (ref 0.9–3.6)
MCH RBC QN AUTO: 28.7 PG (ref 24–34)
MCHC RBC AUTO-ENTMCNC: 33.8 G/DL (ref 31–37)
MCV RBC AUTO: 84.9 FL (ref 74–97)
MONOCYTES # BLD: 0.5 K/UL (ref 0.05–1.2)
MONOCYTES NFR BLD AUTO: 9 % (ref 3–10)
NEUTS SEG # BLD: 2.1 K/UL (ref 1.8–8)
NEUTS SEG NFR BLD AUTO: 40 % (ref 40–73)
PLATELET # BLD AUTO: 278 K/UL (ref 135–420)
PMV BLD AUTO: 10.6 FL (ref 9.2–11.8)
POTASSIUM SERPL-SCNC: 3.9 MMOL/L (ref 3.5–5.5)
PROT SERPL-MCNC: 7.6 G/DL (ref 6.4–8.2)
RBC # BLD AUTO: 4.63 M/UL (ref 4.7–5.5)
SODIUM SERPL-SCNC: 143 MMOL/L (ref 136–145)
WBC # BLD AUTO: 5.3 K/UL (ref 4.6–13.2)

## 2017-08-05 PROCEDURE — 96375 TX/PRO/DX INJ NEW DRUG ADDON: CPT

## 2017-08-05 PROCEDURE — 96374 THER/PROPH/DIAG INJ IV PUSH: CPT

## 2017-08-05 PROCEDURE — 74011250636 HC RX REV CODE- 250/636: Performed by: EMERGENCY MEDICINE

## 2017-08-05 PROCEDURE — 80053 COMPREHEN METABOLIC PANEL: CPT | Performed by: EMERGENCY MEDICINE

## 2017-08-05 PROCEDURE — 74011250637 HC RX REV CODE- 250/637: Performed by: EMERGENCY MEDICINE

## 2017-08-05 PROCEDURE — 74011000250 HC RX REV CODE- 250: Performed by: EMERGENCY MEDICINE

## 2017-08-05 PROCEDURE — 96361 HYDRATE IV INFUSION ADD-ON: CPT

## 2017-08-05 PROCEDURE — 99283 EMERGENCY DEPT VISIT LOW MDM: CPT

## 2017-08-05 PROCEDURE — 85025 COMPLETE CBC W/AUTO DIFF WBC: CPT | Performed by: EMERGENCY MEDICINE

## 2017-08-05 PROCEDURE — 83690 ASSAY OF LIPASE: CPT | Performed by: EMERGENCY MEDICINE

## 2017-08-05 RX ORDER — DICYCLOMINE HYDROCHLORIDE 10 MG/1
20 CAPSULE ORAL
Status: COMPLETED | OUTPATIENT
Start: 2017-08-05 | End: 2017-08-05

## 2017-08-05 RX ORDER — FAMOTIDINE 10 MG/ML
20 INJECTION INTRAVENOUS
Status: COMPLETED | OUTPATIENT
Start: 2017-08-05 | End: 2017-08-05

## 2017-08-05 RX ORDER — DICYCLOMINE HYDROCHLORIDE 20 MG/1
20 TABLET ORAL EVERY 6 HOURS
Qty: 20 TAB | Refills: 0 | Status: SHIPPED | OUTPATIENT
Start: 2017-08-05 | End: 2017-08-10

## 2017-08-05 RX ORDER — OMEPRAZOLE 40 MG/1
40 CAPSULE, DELAYED RELEASE ORAL DAILY
Qty: 14 CAP | Refills: 0 | Status: SHIPPED | OUTPATIENT
Start: 2017-08-05 | End: 2017-08-19

## 2017-08-05 RX ORDER — ONDANSETRON 2 MG/ML
4 INJECTION INTRAMUSCULAR; INTRAVENOUS
Status: COMPLETED | OUTPATIENT
Start: 2017-08-05 | End: 2017-08-05

## 2017-08-05 RX ADMIN — FAMOTIDINE 20 MG: 10 INJECTION, SOLUTION INTRAVENOUS at 04:15

## 2017-08-05 RX ADMIN — SODIUM CHLORIDE 1000 ML: 900 INJECTION, SOLUTION INTRAVENOUS at 04:14

## 2017-08-05 RX ADMIN — ONDANSETRON 4 MG: 2 INJECTION INTRAMUSCULAR; INTRAVENOUS at 04:15

## 2017-08-05 RX ADMIN — DICYCLOMINE HYDROCHLORIDE 20 MG: 10 CAPSULE ORAL at 04:18

## 2017-08-05 NOTE — ED PROVIDER NOTES
[address]  EMERGENCY DEPARTMENT HISTORY AND PHYSICAL EXAM       Date: 8/5/2017   Patient Name: Mauricio Chris   YOB: 1990  Medical Record Number: [id number]    History of Presenting Illness     Chief Complaint   Patient presents with    GI Problem        History Provided By:  patient    Additional History: 3:43 AM   Mauricio Chris is a 31 y.o. male with Hx of ulcers who presents to the emergency department C/O periumbilical abd cramping onset one hour ago. Pt reports he ate shrimp 13 hours ago. Associated sx's include chills, vomiting and diarrhea. FHx of GI problems. Pt denies use of alcohol today, smoking or using illicit drugs, SOB or Hx of abd surgery. Primary Care Provider: None   Specialist:    Past History     Past Medical History:   Past Medical History:   Diagnosis Date    Asthma         Past Surgical History:   History reviewed. No pertinent surgical history. Family History:   History reviewed. No pertinent family history. Social History:   Social History   Substance Use Topics    Smoking status: Never Smoker    Smokeless tobacco: None    Alcohol use Yes      Comment: socially         Allergies:   No Known Allergies     Review of Systems   Review of Systems   Constitutional: Positive for chills. Respiratory: Negative for shortness of breath. Gastrointestinal: Positive for abdominal pain, diarrhea and vomiting. All other systems reviewed and are negative. Physical Exam  Vitals:    08/05/17 0306 08/05/17 0419   BP: 121/66 114/61   Pulse: 62 61   Resp: 16 16   Temp: 98.1 °F (36.7 °C)    SpO2: 100% 99%   Weight: 113.4 kg (250 lb)    Height: 5' 11\" (1.803 m)        Physical Exam   Constitutional: He is oriented to person, place, and time. He appears well-developed and well-nourished. Non-toxic appearance. No distress. Well appearing   HENT:   Head: Normocephalic and atraumatic.    Right Ear: External ear normal.   Left Ear: External ear normal.   Mouth/Throat: Oropharynx is clear and moist. No oropharyngeal exudate. Eyes: Conjunctivae and EOM are normal. Pupils are equal, round, and reactive to light. No scleral icterus. No pallor   Neck: Normal range of motion. Neck supple. No JVD present. No tracheal deviation present. No thyromegaly present. Cardiovascular: Normal rate, regular rhythm and normal heart sounds. Pulmonary/Chest: Effort normal and breath sounds normal. No stridor. No respiratory distress. Abdominal: Soft. Bowel sounds are normal. He exhibits no distension. There is no tenderness. There is no rebound and no guarding. Musculoskeletal: Normal range of motion. He exhibits no edema or tenderness. No soft tissue injuries   Lymphadenopathy:     He has no cervical adenopathy. Neurological: He is alert and oriented to person, place, and time. He has normal reflexes. No cranial nerve deficit. Coordination normal.   Skin: Skin is warm and dry. No rash noted. He is not diaphoretic. No erythema. Psychiatric: He has a normal mood and affect. His behavior is normal. Judgment and thought content normal.   Nursing note and vitals reviewed. Diagnostic Study Results     Labs -      Recent Results (from the past 12 hour(s))   CBC WITH AUTOMATED DIFF    Collection Time: 08/05/17  3:20 AM   Result Value Ref Range    WBC 5.3 4.6 - 13.2 K/uL    RBC 4.63 (L) 4.70 - 5.50 M/uL    HGB 13.3 13.0 - 16.0 g/dL    HCT 39.3 36.0 - 48.0 %    MCV 84.9 74.0 - 97.0 FL    MCH 28.7 24.0 - 34.0 PG    MCHC 33.8 31.0 - 37.0 g/dL    RDW 13.6 11.6 - 14.5 %    PLATELET 360 697 - 330 K/uL    MPV 10.6 9.2 - 11.8 FL    NEUTROPHILS 40 40 - 73 %    LYMPHOCYTES 48 21 - 52 %    MONOCYTES 9 3 - 10 %    EOSINOPHILS 3 0 - 5 %    BASOPHILS 0 0 - 2 %    ABS. NEUTROPHILS 2.1 1.8 - 8.0 K/UL    ABS. LYMPHOCYTES 2.5 0.9 - 3.6 K/UL    ABS. MONOCYTES 0.5 0.05 - 1.2 K/UL    ABS. EOSINOPHILS 0.1 0.0 - 0.4 K/UL    ABS.  BASOPHILS 0.0 0.0 - 0.06 K/UL    DF AUTOMATED METABOLIC PANEL, COMPREHENSIVE    Collection Time: 08/05/17  3:20 AM   Result Value Ref Range    Sodium 143 136 - 145 mmol/L    Potassium 3.9 3.5 - 5.5 mmol/L    Chloride 104 100 - 108 mmol/L    CO2 28 21 - 32 mmol/L    Anion gap 11 3.0 - 18 mmol/L    Glucose 99 74 - 99 mg/dL    BUN 10 7.0 - 18 MG/DL    Creatinine 1.18 0.6 - 1.3 MG/DL    BUN/Creatinine ratio 8 (L) 12 - 20      GFR est AA >60 >60 ml/min/1.73m2    GFR est non-AA >60 >60 ml/min/1.73m2    Calcium 8.6 8.5 - 10.1 MG/DL    Bilirubin, total 0.3 0.2 - 1.0 MG/DL    ALT (SGPT) 27 16 - 61 U/L    AST (SGOT) 17 15 - 37 U/L    Alk. phosphatase 72 45 - 117 U/L    Protein, total 7.6 6.4 - 8.2 g/dL    Albumin 3.6 3.4 - 5.0 g/dL    Globulin 4.0 2.0 - 4.0 g/dL    A-G Ratio 0.9 0.8 - 1.7     LIPASE    Collection Time: 08/05/17  3:20 AM   Result Value Ref Range    Lipase 276 73 - 393 U/L       Radiologic Studies -  The following have been ordered and reviewed:  No orders to display           Medical Decision Making   I am the first provider for this patient. I reviewed the vital signs, available nursing notes, past medical history, past surgical history, family history and social history. Ddx: gastritis more likely then acute food poisoning. Lab work is unremarkable. Chart review shows multiple vitis for gastritis     Vital Signs-Reviewed the patient's vital signs. Patient Vitals for the past 12 hrs:   Temp Pulse Resp BP SpO2   08/05/17 0419 - 61 16 114/61 99 %   08/05/17 0306 98.1 °F (36.7 °C) 62 16 121/66 100 %     Procedures:   Procedures    ED Course:  3:43 PM  Initial assessment performed. The patients presenting problems have been discussed, and they are in agreement with the care plan formulated and outlined with them. I have encouraged them to ask questions as they arise throughout their visit.     Medications Given in the ED:  Medications   famotidine (PF) (PEPCID) injection 20 mg (20 mg IntraVENous Given 8/5/17 3908)   ondansetron (ZOFRAN) injection 4 mg (4 mg IntraVENous Given 8/5/17 0415)   sodium chloride 0.9 % bolus infusion 1,000 mL (0 mL IntraVENous IV Completed 8/5/17 0514)   dicyclomine (BENTYL) capsule 20 mg (20 mg Oral Given 8/5/17 0418)       Discharge Note:  5:45 AM  Pt has been reexamined. Patient has no new complaints, changes, or physical findings. Care plan outlined and precautions discussed. Results were reviewed with the patient. All medications were reviewed with the patient; will d/c home. All of pt's questions and concerns were addressed. Patient was instructed and agrees to follow up with PCP, as well as to return to the ED upon further deterioration. Patient is ready to go home. Diagnosis   Clinical Impression:   1. Gastritis and duodenitis    2. Nausea and vomiting, intractability of vomiting not specified, unspecified vomiting type         Discussion:    Follow-up Information     Follow up With Details Comments Contact Info    [facility] Schedule an appointment as soon as possible for a visit in 2 days  [address] [facility], [address] [place] [address] [address] [place] [address],[address]    [facility] EMERGENCY DEPT  As needed, If symptoms worsen [address] [place] [address]  [phone]          Current Discharge Medication List      START taking these medications    Details   omeprazole (PRILOSEC) 40 mg capsule Take 1 Cap by mouth daily for 14 days. Qty: 14 Cap, Refills: 0      dicyclomine (BENTYL) 20 mg tablet Take 1 Tab by mouth every six (6) hours for 20 doses. Qty: 20 Tab, Refills: 0             _______________________________   Attestations: This note is prepared by Adriana Stahl , acting as a Scribe for Camden Fossa. Giovanna De Leon MD  on 3:32 AM on 8/5/2017 . Camden Fossa. Giovanna De Leon MD: The scribe's documentation has been prepared under my direction and personally reviewed by me in its entirety.   _______________________________

## 2017-08-05 NOTE — DISCHARGE INSTRUCTIONS
Gastritis: Care Instructions  Your Care Instructions    Gastritis is a sore and upset stomach. It happens when something irritates the stomach lining. Many things can cause it. These include an infection such as the flu or something you ate or drank. Medicines or a sore on the lining of the stomach (ulcer) also can cause it. Your belly may bloat and ache. You may belch, vomit, and feel sick to your stomach. You should be able to relieve the problem by taking medicine. And it may help to change your diet. If gastritis lasts, your doctor may prescribe medicine. Follow-up care is a key part of your treatment and safety. Be sure to make and go to all appointments, and call your doctor if you are having problems. It's also a good idea to know your test results and keep a list of the medicines you take. How can you care for yourself at home? · If your doctor prescribed antibiotics, take them as directed. Do not stop taking them just because you feel better. You need to take the full course of antibiotics. · Be safe with medicines. If your doctor prescribed medicine to decrease stomach acid, take it as directed. Call your doctor if you think you are having a problem with your medicine. · Do not take any other medicine, including over-the-counter pain relievers, without talking to your doctor first.  · If your doctor recommends over-the-counter medicine to reduce stomach acid, such as Pepcid AC, Prilosec, Tagamet HB, or Zantac 75, follow the directions on the label. · Drink plenty of fluids (enough so that your urine is light yellow or clear like water) to prevent dehydration. Choose water and other caffeine-free clear liquids. If you have kidney, heart, or liver disease and have to limit fluids, talk with your doctor before you increase the amount of fluids you drink. · Limit how much alcohol you drink. · Avoid coffee, tea, cola drinks, chocolate, and other foods with caffeine.  They increase stomach acid.  When should you call for help? Call 911 anytime you think you may need emergency care. For example, call if:  · You vomit blood or what looks like coffee grounds. · You pass maroon or very bloody stools. Call your doctor now or seek immediate medical care if:  · You start breathing fast and have not produced urine in the last 8 hours. · You cannot keep fluids down. Watch closely for changes in your health, and be sure to contact your doctor if:  · You do not get better as expected. Where can you learn more? Go to http://glenn-mayra.info/. Enter [id number] in the search box to learn more about \"Gastritis: Care Instructions. \"  Current as of: August 9, 2016  Content Version: 11.3  © 0671-4480 [employer]. Care instructions adapted under license by [employer] (which disclaims liability or warranty for this information). If you have questions about a medical condition or this instruction, always ask your healthcare professional. [place] [address] any warranty or liability for your use of this information. Nausea and Vomiting: Care Instructions  Your Care Instructions    When you are nauseated, you may feel weak and sweaty and notice a lot of saliva in your mouth. Nausea often leads to vomiting. Most of the time you do not need to worry about nausea and vomiting, but they can be signs of other illnesses. Two common causes of nausea and vomiting are stomach flu and food poisoning. Nausea and vomiting from viral stomach flu will usually start to improve within 24 hours. Nausea and vomiting from food poisoning may last from 12 to 48 hours. The doctor has checked you carefully, but problems can develop later. If you notice any problems or new symptoms, get medical treatment right away. Follow-up care is a key part of your treatment and safety. Be sure to make and go to all appointments, and call your doctor if you are having problems.  It's also a good idea to know your test results and keep a list of the medicines you take. How can you care for yourself at home? · To prevent dehydration, drink plenty of fluids, enough so that your urine is light yellow or clear like water. Choose water and other caffeine-free clear liquids until you feel better. If you have kidney, heart, or liver disease and have to limit fluids, talk with your doctor before you increase the amount of fluids you drink. · Rest in bed until you feel better. · When you are able to eat, try clear soups, mild foods, and liquids until all symptoms are gone for 12 to 48 hours. Other good choices include dry toast, crackers, cooked cereal, and gelatin dessert, such as Jell-O. When should you call for help? Call 911 anytime you think you may need emergency care. For example, call if:  · You passed out (lost consciousness). Call your doctor now or seek immediate medical care if:  · You have symptoms of dehydration, such as:  ¨ Dry eyes and a dry mouth. ¨ Passing only a little dark urine. ¨ Feeling thirstier than usual.  · You have new or worsening belly pain. · You have a new or higher fever. · You vomit blood or what looks like coffee grounds. Watch closely for changes in your health, and be sure to contact your doctor if:  · You have ongoing nausea and vomiting. · Your vomiting is getting worse. · Your vomiting lasts longer than 2 days. · You are not getting better as expected. Where can you learn more? Go to http://glenn-mayra.info/. Enter 25 [id number] in the search box to learn more about \"Nausea and Vomiting: Care Instructions. \"  Current as of: March 20, 2017  Content Version: 11.3  © 3799-1252 [employer]. Care instructions adapted under license by [employer] (which disclaims liability or warranty for this information).  If you have questions about a medical condition or this instruction, always ask your healthcare professional. [employer] disclaims any warranty or liability for your use of this information.

## 2017-08-05 NOTE — ED NOTES
Discharge instructions given to pt. pt verbalized understanding discharge instructions. Patient left emergency department by foot  With self, in good condition. 2 Prescriptions given. Armband removed and shredded.

## 2017-09-08 ENCOUNTER — APPOINTMENT (OUTPATIENT)
Dept: GENERAL RADIOLOGY | Age: 27
End: 2017-09-08
Attending: PHYSICIAN ASSISTANT
Payer: SELF-PAY

## 2017-09-08 ENCOUNTER — HOSPITAL ENCOUNTER (EMERGENCY)
Age: 27
Discharge: HOME | End: 2017-09-09
Attending: EMERGENCY MEDICINE
Payer: SELF-PAY

## 2017-09-08 ENCOUNTER — APPOINTMENT (OUTPATIENT)
Dept: CT IMAGING | Age: 27
End: 2017-09-08
Attending: PHYSICIAN ASSISTANT
Payer: SELF-PAY

## 2017-09-08 DIAGNOSIS — F12.90 MARIJUANA USE: ICD-10-CM

## 2017-09-08 DIAGNOSIS — R55 NEAR SYNCOPE: Primary | ICD-10-CM

## 2017-09-08 LAB
ALBUMIN SERPL-MCNC: 3.7 G/DL (ref 3.4–5)
ALBUMIN/GLOB SERPL: 0.9 {RATIO} (ref 0.8–1.7)
ALP SERPL-CCNC: 66 U/L (ref 45–117)
ALT SERPL-CCNC: 28 U/L (ref 16–61)
ANION GAP SERPL CALC-SCNC: 8 MMOL/L (ref 3–18)
AST SERPL-CCNC: 20 U/L (ref 15–37)
BASOPHILS # BLD: 0 K/UL (ref 0–0.06)
BASOPHILS NFR BLD: 0 % (ref 0–2)
BILIRUB SERPL-MCNC: 0.2 MG/DL (ref 0.2–1)
BUN SERPL-MCNC: 14 MG/DL (ref 7–18)
BUN/CREAT SERPL: 11 (ref 12–20)
CALCIUM SERPL-MCNC: 8.8 MG/DL (ref 8.5–10.1)
CHLORIDE SERPL-SCNC: 106 MMOL/L (ref 100–108)
CK MB CFR SERPL CALC: ABNORMAL % (ref 0–4)
CK MB SERPL-MCNC: <1 NG/ML (ref 5–25)
CK SERPL-CCNC: 512 U/L (ref 39–308)
CO2 SERPL-SCNC: 30 MMOL/L (ref 21–32)
CREAT SERPL-MCNC: 1.25 MG/DL (ref 0.6–1.3)
DIFFERENTIAL METHOD BLD: ABNORMAL
EOSINOPHIL # BLD: 0.2 K/UL (ref 0–0.4)
EOSINOPHIL NFR BLD: 3 % (ref 0–5)
ERYTHROCYTE [DISTWIDTH] IN BLOOD BY AUTOMATED COUNT: 13.5 % (ref 11.6–14.5)
GLOBULIN SER CALC-MCNC: 4 G/DL (ref 2–4)
GLUCOSE BLD STRIP.AUTO-MCNC: 88 MG/DL (ref 70–110)
GLUCOSE SERPL-MCNC: 84 MG/DL (ref 74–99)
HCT VFR BLD AUTO: 38.6 % (ref 36–48)
HGB BLD-MCNC: 13 G/DL (ref 13–16)
LYMPHOCYTES # BLD: 3 K/UL (ref 0.9–3.6)
LYMPHOCYTES NFR BLD: 58 % (ref 21–52)
MCH RBC QN AUTO: 28.7 PG (ref 24–34)
MCHC RBC AUTO-ENTMCNC: 33.7 G/DL (ref 31–37)
MCV RBC AUTO: 85.2 FL (ref 74–97)
MONOCYTES # BLD: 0.5 K/UL (ref 0.05–1.2)
MONOCYTES NFR BLD: 9 % (ref 3–10)
NEUTS SEG # BLD: 1.6 K/UL (ref 1.8–8)
NEUTS SEG NFR BLD: 30 % (ref 40–73)
PLATELET # BLD AUTO: 236 K/UL (ref 135–420)
PMV BLD AUTO: 11.1 FL (ref 9.2–11.8)
POTASSIUM SERPL-SCNC: 3.5 MMOL/L (ref 3.5–5.5)
PROT SERPL-MCNC: 7.7 G/DL (ref 6.4–8.2)
RBC # BLD AUTO: 4.53 M/UL (ref 4.7–5.5)
SODIUM SERPL-SCNC: 144 MMOL/L (ref 136–145)
TROPONIN I SERPL-MCNC: <0.02 NG/ML (ref 0–0.06)
WBC # BLD AUTO: 5.3 K/UL (ref 4.6–13.2)

## 2017-09-08 PROCEDURE — 85025 COMPLETE CBC W/AUTO DIFF WBC: CPT | Performed by: EMERGENCY MEDICINE

## 2017-09-08 PROCEDURE — 80053 COMPREHEN METABOLIC PANEL: CPT | Performed by: EMERGENCY MEDICINE

## 2017-09-08 PROCEDURE — 99284 EMERGENCY DEPT VISIT MOD MDM: CPT

## 2017-09-08 PROCEDURE — 71010 XR CHEST PORT: CPT

## 2017-09-08 PROCEDURE — 70450 CT HEAD/BRAIN W/O DYE: CPT

## 2017-09-08 PROCEDURE — 82962 GLUCOSE BLOOD TEST: CPT

## 2017-09-08 PROCEDURE — 93005 ELECTROCARDIOGRAM TRACING: CPT

## 2017-09-08 PROCEDURE — 82550 ASSAY OF CK (CPK): CPT | Performed by: PHYSICIAN ASSISTANT

## 2017-09-09 VITALS
OXYGEN SATURATION: 100 % | SYSTOLIC BLOOD PRESSURE: 125 MMHG | DIASTOLIC BLOOD PRESSURE: 75 MMHG | HEIGHT: 71 IN | TEMPERATURE: 98.2 F | BODY MASS INDEX: 35 KG/M2 | HEART RATE: 64 BPM | WEIGHT: 250 LBS | RESPIRATION RATE: 12 BRPM

## 2017-09-09 NOTE — ED TRIAGE NOTES
Pt reports that one week ago he was eating cake at the [address] and got hot and then went out and his friends had to wake him up. States that he went to [facility] and left AMA after becoming aggressive with staff because they weren't respectful of him because they stuck him seven times trying to draw blood and didn't remove needle when he told them too. Pt states that tonight same type of event occurred but he was able to stop himself from going out because he felt the event occurring. At end of triage when asked about pain, pt states that he has chest pain. Sepsis Screening completed    (  )Patient meets SIRS criteria. ( x )Patient does not meet SIRS criteria.       SIRS Criteria is achieved when two or more of the following are present   Temperature < 96.8°F (36°C) or > 100.9°F (38.3°C)   Heart Rate > 90 beats per minute   Respiratory Rate > 20 breaths per minute   WBC count > 12,000 or <4,000 or > 10% bands

## 2017-09-09 NOTE — DISCHARGE INSTRUCTIONS
Lightheadedness or Faintness: Care Instructions  Your Care Instructions  Lightheadedness is a feeling that you are about to faint or \"pass out. \" You do not feel as if you or your surroundings are moving. It is different from vertigo, which is the feeling that you or things around you are spinning or tilting. Lightheadedness usually goes away or gets better when you lie down. If lightheadedness gets worse, it can lead to a fainting spell. It is common to feel lightheaded from time to time. Lightheadedness usually is not caused by a serious problem. It often is caused by a short-lasting drop in blood pressure and blood flow to your head that occurs when you get up too quickly from a seated or lying position. Follow-up care is a key part of your treatment and safety. Be sure to make and go to all appointments, and call your doctor if you are having problems. It's also a good idea to know your test results and keep a list of the medicines you take. How can you care for yourself at home? · Lie down for 1 or 2 minutes when you feel lightheaded. After lying down, sit up slowly and remain sitting for 1 to 2 minutes before slowly standing up. · Avoid movements, positions, or activities that have made you lightheaded in the past.  · Get plenty of rest, especially if you have a cold or flu, which can cause lightheadedness. · Make sure you drink plenty of fluids, especially if you have a fever or have been sweating. · Do not drive or put yourself and others in danger while you feel lightheaded. When should you call for help? Call 911 anytime you think you may need emergency care. For example, call if:  · You have symptoms of a stroke. These may include:  ¨ Sudden numbness, tingling, weakness, or loss of movement in your face, arm, or leg, especially on only one side of your body. ¨ Sudden vision changes. ¨ Sudden trouble speaking. ¨ Sudden confusion or trouble understanding simple statements.   ¨ Sudden problems with walking or balance. ¨ A sudden, severe headache that is different from past headaches. · You have symptoms of a heart attack. These may include:  ¨ Chest pain or pressure, or a strange feeling in the chest.  ¨ Sweating. ¨ Shortness of breath. ¨ Nausea or vomiting. ¨ Pain, pressure, or a strange feeling in the back, neck, jaw, or upper belly or in one or both shoulders or arms. ¨ Lightheadedness or sudden weakness. ¨ A fast or irregular heartbeat. After you call 911, the  may tell you to chew 1 adult-strength or 2 to 4 low-dose aspirin. Wait for an ambulance. Do not try to drive yourself. Watch closely for changes in your health, and be sure to contact your doctor if:  · Your lightheadedness gets worse or does not get better with home care. Where can you learn more? Go to http://glenn-mayra.info/. Enter Z667 in the search box to learn more about \"Lightheadedness or Faintness: Care Instructions. \"  Current as of: March 20, 2017  Content Version: 11.3  © 5331-9125 [employer]. Care instructions adapted under license by [employer] (which disclaims liability or warranty for this information). If you have questions about a medical condition or this instruction, always ask your healthcare professional. [address] any warranty or liability for your use of this information. Marijuana Use in Teens: Care Instructions  Your Care Instructions  During your exam, traces of marijuana were found in your body. The active chemical in marijuana is THC. THC usually can be found in urine for a few days after marijuana is used. If the use is heavy, THC may be found for weeks after the use has stopped. In the [place], marijuana laws vary widely. The drug is legal in some states for those over age 24. So its use by teens is illegal. And marijuana possession is still a crime under federal law.   Most schools and employers have strict rules about drug use. Many do drug testing. A positive drug test might cause you to be expelled from school or keep you from getting into a school you want to attend. You might not be able to take part in school sports or clubs. Or it might cause you to lose a job or keep you from getting hired. Follow-up care is a key part of your treatment and safety. Be sure to make and go to all appointments, and call your doctor if you are having problems. It's also a good idea to know your test results and keep a list of the medicines you take. How can you care for yourself at home? · Think carefully about whether using marijuana is worth the risks. · Do not drive or operate heavy equipment while using marijuana. Using marijuana may affect your judgment and coordination. And it can increase your risk of being in a car crash. · Make sure you understand the laws in your area. · Know the policies of your school or your employer. When should you call for help? Watch closely for changes in your health, and contact your doctor if you think you have a problem with marijuana use. Where can you learn more? Go to http://glenn-mayra.info/. Enter P293 in the search box to learn more about \"Marijuana Use in Teens: Care Instructions. \"  Current as of: November 3, 2016  Content Version: 11.3  © 5498-9908 [employer]. Care instructions adapted under license by [employer] (which disclaims liability or warranty for this information). If you have questions about a medical condition or this instruction, always ask your healthcare professional. [address] any warranty or liability for your use of this information.

## 2017-09-09 NOTE — ED PROVIDER NOTES
[address]  EMERGENCY DEPARTMENT HISTORY AND PHYSICAL EXAM       Date: 9/8/2017   Patient Name: Ambar Schneider   YOB: 1990  Medical Record Number: [id number]    History of Presenting Illness     Chief Complaint   Patient presents with    Other        History Provided By:  patient    Additional History: 10:47 PM   Ambar Schneider is a 31 y.o. male who presents to the emergency department C/O near syncope PTA. Associated sx's include palpitations, tingling in body and diaphoresis. Pt mentions similar sx's one week ago while at a birthday party at [facility], when pt passed out he fell and hit left side of face. Pt reports his friend told him that when he awoke he was out of it and pulled his fire arm. Pt's friends took him to [facility], but care was not complete because pt left AMA after refusing blood draw. Pt had another episode in 2012 and 911 was called, he was brought to [facility]. Pt denies smoking cigarettes, IV drug use or use of alcohol the days of these episodes, urinary incontinence, biting tongue, any other sx's or complaints. FHx of heart disease. Pt smokes marijuana. Primary Care Provider: None   Specialist:    Past History     Past Medical History:   Past Medical History:   Diagnosis Date    Asthma         Past Surgical History:   History reviewed. No pertinent surgical history. Family History:   History reviewed. No pertinent family history. Social History:   Social History   Substance Use Topics    Smoking status: Never Smoker    Smokeless tobacco: None    Alcohol use Yes      Comment: socially         Allergies:   No Known Allergies     Review of Systems   Review of Systems   Constitutional: Positive for diaphoresis. Cardiovascular: Positive for palpitations. Neurological: Positive for syncope (near). (+) tingling   All other systems reviewed and are negative.       Physical Exam  Vitals:    09/08/17 2205 09/08/17 2300 09/08/17 2330   BP: 164/70 120/62 124/68   Pulse: 78 62 79   Resp: 16 13 12   Temp: 98.2 °F (36.8 °C)     SpO2: 100% 100% 100%   Weight: 113.4 kg (250 lb)     Height: 5' 11\" (1.803 m)         Physical Exam   Nursing note and vitals reviewed. Vital signs and nursing notes reviewed. CONSTITUTIONAL: Alert. Well-appearing; well-nourished; in no apparent distress. HEAD: Normocephalic; faint nontender bruise left zygoma. EYES: PERRL; EOM's intact. No nystagmus. Conjunctiva clear. ENT: TM's normal. External ear normal. Normal nose; no rhinorrhea. Normal pharynx. Moist mucus membranes. NECK: Supple; FROM without difficulty, non-tender; no cervical lymphadenopathy. CV: Normal S1, S2; no murmurs, rubs, or gallops. No chest wall tenderness. RESPIRATORY: Normal chest excursion with respiration; breath sounds clear and equal bilaterally; no wheezes, rhonchi, or rales. GI: Normal bowel sounds; non-distended; non-tender; no guarding or rigidity; no palpable organomegaly. No CVA tenderness. BACK:  No evidence of trauma or deformity. Non-tender to palpation. FROM without difficulty. Negative straight leg raise bilaterally. EXT: Normal ROM in all four extremities; non-tender to palpation. SKIN: Normal for age and race; warm; dry; good turgor; no apparent lesions or exudate. NEURO: A & O x3. Cranial nerves 2-12 intact. Motor 5/5 bilaterally. Sensation intact. Normal speech. Normal gait. PSYCH:  Mood and affect appropriate.       Diagnostic Study Results     Labs -      Recent Results (from the past 12 hour(s))   EKG, 12 LEAD, INITIAL    Collection Time: 09/08/17 10:14 PM   Result Value Ref Range    Ventricular Rate 75 BPM    Atrial Rate 75 BPM    P-R Interval 164 ms    QRS Duration 134 ms    Q-T Interval 416 ms    QTC Calculation (Bezet) 464 ms    Calculated P Axis 76 degrees    Calculated R Axis 71 degrees    Calculated T Axis 48 degrees    Diagnosis       Normal sinus rhythm  Nonspecific intraventricular block  Abnormal ECG  When compared with ECG of 27-JUN-2016 10:04,  T wave amplitude has decreased in Anterior leads     GLUCOSE, POC    Collection Time: 09/08/17 10:26 PM   Result Value Ref Range    Glucose (POC) 88 70 - 110 mg/dL   CBC WITH AUTOMATED DIFF    Collection Time: 09/08/17 10:40 PM   Result Value Ref Range    WBC 5.3 4.6 - 13.2 K/uL    RBC 4.53 (L) 4.70 - 5.50 M/uL    HGB 13.0 13.0 - 16.0 g/dL    HCT 38.6 36.0 - 48.0 %    MCV 85.2 74.0 - 97.0 FL    MCH 28.7 24.0 - 34.0 PG    MCHC 33.7 31.0 - 37.0 g/dL    RDW 13.5 11.6 - 14.5 %    PLATELET 701 540 - 158 K/uL    MPV 11.1 9.2 - 11.8 FL    NEUTROPHILS 30 (L) 40 - 73 %    LYMPHOCYTES 58 (H) 21 - 52 %    MONOCYTES 9 3 - 10 %    EOSINOPHILS 3 0 - 5 %    BASOPHILS 0 0 - 2 %    ABS. NEUTROPHILS 1.6 (L) 1.8 - 8.0 K/UL    ABS. LYMPHOCYTES 3.0 0.9 - 3.6 K/UL    ABS. MONOCYTES 0.5 0.05 - 1.2 K/UL    ABS. EOSINOPHILS 0.2 0.0 - 0.4 K/UL    ABS. BASOPHILS 0.0 0.0 - 0.06 K/UL    DF AUTOMATED     METABOLIC PANEL, COMPREHENSIVE    Collection Time: 09/08/17 10:40 PM   Result Value Ref Range    Sodium 144 136 - 145 mmol/L    Potassium 3.5 3.5 - 5.5 mmol/L    Chloride 106 100 - 108 mmol/L    CO2 30 21 - 32 mmol/L    Anion gap 8 3.0 - 18 mmol/L    Glucose 84 74 - 99 mg/dL    BUN 14 7.0 - 18 MG/DL    Creatinine 1.25 0.6 - 1.3 MG/DL    BUN/Creatinine ratio 11 (L) 12 - 20      GFR est AA >60 >60 ml/min/1.73m2    GFR est non-AA >60 >60 ml/min/1.73m2    Calcium 8.8 8.5 - 10.1 MG/DL    Bilirubin, total 0.2 0.2 - 1.0 MG/DL    ALT (SGPT) 28 16 - 61 U/L    AST (SGOT) 20 15 - 37 U/L    Alk.  phosphatase 66 45 - 117 U/L    Protein, total 7.7 6.4 - 8.2 g/dL    Albumin 3.7 3.4 - 5.0 g/dL    Globulin 4.0 2.0 - 4.0 g/dL    A-G Ratio 0.9 0.8 - 1.7     CARDIAC PANEL,(CK, CKMB & TROPONIN)    Collection Time: 09/08/17 10:40 PM   Result Value Ref Range     (H) 39 - 308 U/L    CK - MB <1.0 <3.6 ng/ml    CK-MB Index  0.0 - 4.0 %     CALCULATION NOT PERFORMED WHEN RESULT IS BELOW LINEAR LIMIT Troponin-I, Qt. <0.02 0.00 - 0.06 NG/ML       Radiologic Studies -  The following have been ordered and reviewed:  CT HEAD WO CONT   Final Result        No evidence of hemorrhage, mass effect or recent stroke of a major vascular  Distribution. As read by the radiologist.    XR CHEST PORT    (Results Pending)     11:51 PM  RADIOLOGY FINDINGS  chest X-ray shows NAP  Pending review by Radiologist  Recorded by Little Kang ED Scribe, as dictated by Niko Baxter PA-C     Medical Decision Making   I am the first provider for this patient. I reviewed the vital signs, available nursing notes, past medical history, past surgical history, family history and social history. Ddx: vasovagal syncope, arrhythmia, hypertrophic cardiomyopathy, seizure, electrolyte abnormality, intracranial mass, autonomic dysfunction    Vital Signs-Reviewed the patient's vital signs. Patient Vitals for the past 12 hrs:   Temp Pulse Resp BP SpO2   09/08/17 2330 - 79 12 124/68 100 %   09/08/17 2300 - 62 13 120/62 100 %   09/08/17 2205 98.2 °F (36.8 °C) 78 16 164/70 100 %       Pulse Oximetry Analysis - Normal 100% on RA     Cardiac Monitor:   Rate: 75 bpm  Rhythm: Normal Sinus Rhythm     EKG interpretation: (Preliminary)  Rhythm: NSR. Rate (approx.): 75 bpm; nonspecific intraventricular block   EKG read by Tushar Kim. Tangela Shaw MD  at 22:14    Procedures:   Procedures    ED Course:  10:47 PM  Initial assessment performed. The patients presenting problems have been discussed, and they are in agreement with the care plan formulated and outlined with them. I have encouraged them to ask questions as they arise throughout their visit. Medications Given in the ED:  Medications - No data to display    CONSULT NOTE:   1:11 AM  TALYOR Reddy spoke with Dr. Krystal Ríos  Specialty: ED attending  Discussed pt's hx, disposition, and available diagnostic and imaging results. Reviewed care plans.  Pt has had similar episodes in the past, as far back as 2012. No arrhythmia in ED. EKG unremarkable and unchanged. Labs normal. CT head negative. Consulting physician agrees with workup and plans as outlined. Recommends discharge home with outpt follow up. I will place  consult for PCP follow up. Written by TAYLOR Dawson . Discharge Note:  1:06 AM  Pt has been reexamined. Patient has no new complaints, changes, or physical findings. Care plan outlined and precautions discussed. Results were reviewed with the patient. All medications were reviewed with the patient; will d/c home. All of pt's questions and concerns were addressed. Patient was instructed and agrees to follow up with PCP, as well as to return to the ED upon further deterioration. Patient is ready to go home. Diagnosis   Clinical Impression:   1. Near syncope    2. Marijuana use         Discussion:    Follow-up Information     Follow up With Details Comments Contact Info    [facility] Schedule an appointment as soon as possible for a visit in 2 days  [address] [facility], [address] [place] [address],[address]    [facility] EM[facility]  As needed, If symptoms worsen [address] [phone]  [phone]          There are no discharge medications for this patient.      _______________________________   Attestations: This note is prepared by Erin De Leon , acting as a Scribe for Gabby Fishman PA-C  on 10:45 PM on 9/8/2017 . Gabby Fishman PA-C: The scribe's documentation has been prepared under my direction and personally reviewed by me in its entirety.   _______________________________

## 2017-09-09 NOTE — ED NOTES
Pt reports feeling stressed out on Sunday, fell asleep and \"woke up on the floor\" causing an abrasion to left side of face. States that tonight while hanging out with friends, started to feel dizzy and lightheaded and felt like he was going to pass out. Denies any known medical problems or family hx. States that he has recently stopped \"burning blunts\" x 2 days ago. States that he is concerned that this has been happening.

## 2017-09-11 LAB
ATRIAL RATE: 75 BPM
CALCULATED P AXIS, ECG09: 76 DEGREES
CALCULATED R AXIS, ECG10: 71 DEGREES
CALCULATED T AXIS, ECG11: 48 DEGREES
DIAGNOSIS, 93000: NORMAL
P-R INTERVAL, ECG05: 164 MS
Q-T INTERVAL, ECG07: 416 MS
QRS DURATION, ECG06: 134 MS
QTC CALCULATION (BEZET), ECG08: 464 MS
VENTRICULAR RATE, ECG03: 75 BPM

## 2017-09-28 ENCOUNTER — APPOINTMENT (OUTPATIENT)
Dept: CT IMAGING | Age: 27
End: 2017-09-28
Attending: EMERGENCY MEDICINE
Payer: SELF-PAY

## 2017-09-28 ENCOUNTER — HOSPITAL ENCOUNTER (OUTPATIENT)
Age: 27
Setting detail: OBSERVATION
Discharge: HOME | End: 2017-09-29
Attending: EMERGENCY MEDICINE | Admitting: SURGERY
Payer: SELF-PAY

## 2017-09-28 ENCOUNTER — ANESTHESIA EVENT (OUTPATIENT)
Dept: SURGERY | Age: 27
End: 2017-09-28
Payer: SELF-PAY

## 2017-09-28 ENCOUNTER — ANESTHESIA (OUTPATIENT)
Dept: SURGERY | Age: 27
End: 2017-09-28
Payer: SELF-PAY

## 2017-09-28 DIAGNOSIS — K35.80 ACUTE APPENDICITIS, UNSPECIFIED ACUTE APPENDICITIS TYPE: Primary | ICD-10-CM

## 2017-09-28 PROBLEM — K37 APPENDICITIS: Status: ACTIVE | Noted: 2017-09-28

## 2017-09-28 LAB
ALBUMIN SERPL-MCNC: 4 G/DL (ref 3.4–5)
ALBUMIN/GLOB SERPL: 0.9 {RATIO} (ref 0.8–1.7)
ALP SERPL-CCNC: 70 U/L (ref 45–117)
ALT SERPL-CCNC: 27 U/L (ref 16–61)
ANION GAP SERPL CALC-SCNC: 9 MMOL/L (ref 3–18)
APPEARANCE UR: CLEAR
AST SERPL-CCNC: 24 U/L (ref 15–37)
BACTERIA URNS QL MICRO: ABNORMAL /HPF
BASOPHILS # BLD: 0 K/UL (ref 0–0.06)
BASOPHILS NFR BLD: 0 % (ref 0–2)
BILIRUB SERPL-MCNC: 0.5 MG/DL (ref 0.2–1)
BILIRUB UR QL: NEGATIVE
BUN SERPL-MCNC: 9 MG/DL (ref 7–18)
BUN/CREAT SERPL: 8 (ref 12–20)
CALCIUM SERPL-MCNC: 9 MG/DL (ref 8.5–10.1)
CHLORIDE SERPL-SCNC: 103 MMOL/L (ref 100–108)
CK MB CFR SERPL CALC: ABNORMAL % (ref 0–4)
CK MB SERPL-MCNC: <1 NG/ML (ref 5–25)
CK SERPL-CCNC: 376 U/L (ref 39–308)
CO2 SERPL-SCNC: 28 MMOL/L (ref 21–32)
COLOR UR: YELLOW
CREAT SERPL-MCNC: 1.15 MG/DL (ref 0.6–1.3)
DIFFERENTIAL METHOD BLD: ABNORMAL
EOSINOPHIL # BLD: 0 K/UL (ref 0–0.4)
EOSINOPHIL NFR BLD: 1 % (ref 0–5)
ERYTHROCYTE [DISTWIDTH] IN BLOOD BY AUTOMATED COUNT: 13.6 % (ref 11.6–14.5)
GLOBULIN SER CALC-MCNC: 4.4 G/DL (ref 2–4)
GLUCOSE SERPL-MCNC: 106 MG/DL (ref 74–99)
GLUCOSE UR STRIP.AUTO-MCNC: NEGATIVE MG/DL
HCT VFR BLD AUTO: 43.7 % (ref 36–48)
HGB BLD-MCNC: 14.6 G/DL (ref 13–16)
HGB UR QL STRIP: ABNORMAL
KETONES UR QL STRIP.AUTO: 15 MG/DL
LEUKOCYTE ESTERASE UR QL STRIP.AUTO: ABNORMAL
LIPASE SERPL-CCNC: 159 U/L (ref 73–393)
LYMPHOCYTES # BLD: 0.8 K/UL (ref 0.9–3.6)
LYMPHOCYTES NFR BLD: 24 % (ref 21–52)
MCH RBC QN AUTO: 28.6 PG (ref 24–34)
MCHC RBC AUTO-ENTMCNC: 33.4 G/DL (ref 31–37)
MCV RBC AUTO: 85.5 FL (ref 74–97)
MONOCYTES # BLD: 0.3 K/UL (ref 0.05–1.2)
MONOCYTES NFR BLD: 9 % (ref 3–10)
NEUTS SEG # BLD: 2.3 K/UL (ref 1.8–8)
NEUTS SEG NFR BLD: 66 % (ref 40–73)
NITRITE UR QL STRIP.AUTO: NEGATIVE
PH UR STRIP: 7.5 [PH] (ref 5–8)
PLATELET # BLD AUTO: 249 K/UL (ref 135–420)
PMV BLD AUTO: 10.7 FL (ref 9.2–11.8)
POTASSIUM SERPL-SCNC: 4.1 MMOL/L (ref 3.5–5.5)
PROT SERPL-MCNC: 8.4 G/DL (ref 6.4–8.2)
PROT UR STRIP-MCNC: ABNORMAL MG/DL
RBC # BLD AUTO: 5.11 M/UL (ref 4.7–5.5)
RBC #/AREA URNS HPF: ABNORMAL /HPF (ref 0–5)
SODIUM SERPL-SCNC: 140 MMOL/L (ref 136–145)
SP GR UR REFRACTOMETRY: 1.02 (ref 1–1.03)
TROPONIN I SERPL-MCNC: <0.02 NG/ML (ref 0–0.06)
UROBILINOGEN UR QL STRIP.AUTO: 1 EU/DL (ref 0.2–1)
WBC # BLD AUTO: 3.4 K/UL (ref 4.6–13.2)
WBC URNS QL MICRO: ABNORMAL /HPF (ref 0–5)

## 2017-09-28 PROCEDURE — 80053 COMPREHEN METABOLIC PANEL: CPT | Performed by: EMERGENCY MEDICINE

## 2017-09-28 PROCEDURE — 81001 URINALYSIS AUTO W/SCOPE: CPT | Performed by: EMERGENCY MEDICINE

## 2017-09-28 PROCEDURE — 77030008683 HC TU ET CUF COVD -A: Performed by: SPECIALIST

## 2017-09-28 PROCEDURE — 77030002967 HC SUT PDS J&J -B: Performed by: SURGERY

## 2017-09-28 PROCEDURE — 74011250636 HC RX REV CODE- 250/636: Performed by: EMERGENCY MEDICINE

## 2017-09-28 PROCEDURE — 82550 ASSAY OF CK (CPK): CPT | Performed by: EMERGENCY MEDICINE

## 2017-09-28 PROCEDURE — 74011000250 HC RX REV CODE- 250: Performed by: EMERGENCY MEDICINE

## 2017-09-28 PROCEDURE — 77030008603 HC TRCR ENDOSC EPATH J&J -C: Performed by: SURGERY

## 2017-09-28 PROCEDURE — 77030008602 HC TRCR ENDOSC EPATH J&J -B: Performed by: SURGERY

## 2017-09-28 PROCEDURE — 74011000258 HC RX REV CODE- 258: Performed by: EMERGENCY MEDICINE

## 2017-09-28 PROCEDURE — 77030034154 HC SHR COAG HARM ACE J&J -F: Performed by: SURGERY

## 2017-09-28 PROCEDURE — 77030008477 HC STYL SATN SLP COVD -A: Performed by: SPECIALIST

## 2017-09-28 PROCEDURE — 99283 EMERGENCY DEPT VISIT LOW MDM: CPT

## 2017-09-28 PROCEDURE — 85025 COMPLETE CBC W/AUTO DIFF WBC: CPT | Performed by: EMERGENCY MEDICINE

## 2017-09-28 PROCEDURE — 77030009851 HC PCH RTVR ENDOSC AMR -B: Performed by: SURGERY

## 2017-09-28 PROCEDURE — 74011000250 HC RX REV CODE- 250

## 2017-09-28 PROCEDURE — 74011000250 HC RX REV CODE- 250: Performed by: SURGERY

## 2017-09-28 PROCEDURE — 77030011810 HC STPLR ENDOSC J&J -G: Performed by: SURGERY

## 2017-09-28 PROCEDURE — 88304 TISSUE EXAM BY PATHOLOGIST: CPT | Performed by: SURGERY

## 2017-09-28 PROCEDURE — 83690 ASSAY OF LIPASE: CPT | Performed by: EMERGENCY MEDICINE

## 2017-09-28 PROCEDURE — 77010033678 HC OXYGEN DAILY

## 2017-09-28 PROCEDURE — 77030018836 HC SOL IRR NACL ICUM -A: Performed by: SURGERY

## 2017-09-28 PROCEDURE — 76060000032 HC ANESTHESIA 0.5 TO 1 HR: Performed by: SURGERY

## 2017-09-28 PROCEDURE — 74011636320 HC RX REV CODE- 636/320: Performed by: EMERGENCY MEDICINE

## 2017-09-28 PROCEDURE — 77030018004 HC RELD STPLR ENDOSC1 J&J -C: Performed by: SURGERY

## 2017-09-28 PROCEDURE — 74011250636 HC RX REV CODE- 250/636: Performed by: SPECIALIST

## 2017-09-28 PROCEDURE — 77030006643: Performed by: SPECIALIST

## 2017-09-28 PROCEDURE — 76010000138 HC OR TIME 0.5 TO 1 HR: Performed by: SURGERY

## 2017-09-28 PROCEDURE — 74011250636 HC RX REV CODE- 250/636

## 2017-09-28 PROCEDURE — 74177 CT ABD & PELVIS W/CONTRAST: CPT

## 2017-09-28 PROCEDURE — 96374 THER/PROPH/DIAG INJ IV PUSH: CPT

## 2017-09-28 PROCEDURE — 77030032490 HC SLV COMPR SCD KNE COVD -B: Performed by: SURGERY

## 2017-09-28 PROCEDURE — 74011250636 HC RX REV CODE- 250/636: Performed by: SURGERY

## 2017-09-28 PROCEDURE — 77030002935 HC SUT MCRYL J&J -C: Performed by: SURGERY

## 2017-09-28 PROCEDURE — 77030031139 HC SUT VCRL2 J&J -A: Performed by: SURGERY

## 2017-09-28 PROCEDURE — 99218 HC RM OBSERVATION: CPT

## 2017-09-28 PROCEDURE — 96375 TX/PRO/DX INJ NEW DRUG ADDON: CPT

## 2017-09-28 PROCEDURE — 96361 HYDRATE IV INFUSION ADD-ON: CPT

## 2017-09-28 PROCEDURE — 74011000258 HC RX REV CODE- 258: Performed by: SURGERY

## 2017-09-28 PROCEDURE — 76210000016 HC OR PH I REC 1 TO 1.5 HR: Performed by: SURGERY

## 2017-09-28 PROCEDURE — 93005 ELECTROCARDIOGRAM TRACING: CPT

## 2017-09-28 PROCEDURE — 77030003580 HC NDL INSUF VERES J&J -B: Performed by: SURGERY

## 2017-09-28 RX ORDER — OXYCODONE AND ACETAMINOPHEN 5; 325 MG/1; MG/1
1 TABLET ORAL AS NEEDED
Status: DISCONTINUED | OUTPATIENT
Start: 2017-09-28 | End: 2017-09-28 | Stop reason: HOSPADM

## 2017-09-28 RX ORDER — BUPIVACAINE HYDROCHLORIDE 5 MG/ML
INJECTION, SOLUTION EPIDURAL; INTRACAUDAL AS NEEDED
Status: DISCONTINUED | OUTPATIENT
Start: 2017-09-28 | End: 2017-09-28 | Stop reason: HOSPADM

## 2017-09-28 RX ORDER — LIDOCAINE HYDROCHLORIDE 20 MG/ML
INJECTION, SOLUTION EPIDURAL; INFILTRATION; INTRACAUDAL; PERINEURAL AS NEEDED
Status: DISCONTINUED | OUTPATIENT
Start: 2017-09-28 | End: 2017-09-28 | Stop reason: HOSPADM

## 2017-09-28 RX ORDER — FENTANYL CITRATE 50 UG/ML
25 INJECTION, SOLUTION INTRAMUSCULAR; INTRAVENOUS
Status: ACTIVE | OUTPATIENT
Start: 2017-09-28 | End: 2017-09-28

## 2017-09-28 RX ORDER — ONDANSETRON 2 MG/ML
4 INJECTION INTRAMUSCULAR; INTRAVENOUS ONCE
Status: DISCONTINUED | OUTPATIENT
Start: 2017-09-28 | End: 2017-09-28 | Stop reason: HOSPADM

## 2017-09-28 RX ORDER — SODIUM CHLORIDE, SODIUM LACTATE, POTASSIUM CHLORIDE, CALCIUM CHLORIDE 600; 310; 30; 20 MG/100ML; MG/100ML; MG/100ML; MG/100ML
150 INJECTION, SOLUTION INTRAVENOUS CONTINUOUS
Status: DISCONTINUED | OUTPATIENT
Start: 2017-09-28 | End: 2017-09-29 | Stop reason: HOSPADM

## 2017-09-28 RX ORDER — FENTANYL CITRATE 50 UG/ML
INJECTION, SOLUTION INTRAMUSCULAR; INTRAVENOUS AS NEEDED
Status: DISCONTINUED | OUTPATIENT
Start: 2017-09-28 | End: 2017-09-28 | Stop reason: HOSPADM

## 2017-09-28 RX ORDER — KETOROLAC TROMETHAMINE 30 MG/ML
30 INJECTION, SOLUTION INTRAMUSCULAR; INTRAVENOUS ONCE
Status: COMPLETED | OUTPATIENT
Start: 2017-09-28 | End: 2017-09-28

## 2017-09-28 RX ORDER — SODIUM CHLORIDE, SODIUM LACTATE, POTASSIUM CHLORIDE, CALCIUM CHLORIDE 600; 310; 30; 20 MG/100ML; MG/100ML; MG/100ML; MG/100ML
50 INJECTION, SOLUTION INTRAVENOUS CONTINUOUS
Status: DISCONTINUED | OUTPATIENT
Start: 2017-09-28 | End: 2017-09-28 | Stop reason: HOSPADM

## 2017-09-28 RX ORDER — HYDROMORPHONE HYDROCHLORIDE 2 MG/ML
0.5 INJECTION, SOLUTION INTRAMUSCULAR; INTRAVENOUS; SUBCUTANEOUS
Status: DISCONTINUED | OUTPATIENT
Start: 2017-09-28 | End: 2017-09-28 | Stop reason: HOSPADM

## 2017-09-28 RX ORDER — ONDANSETRON 2 MG/ML
INJECTION INTRAMUSCULAR; INTRAVENOUS AS NEEDED
Status: DISCONTINUED | OUTPATIENT
Start: 2017-09-28 | End: 2017-09-28 | Stop reason: HOSPADM

## 2017-09-28 RX ORDER — MORPHINE SULFATE 4 MG/ML
8 INJECTION, SOLUTION INTRAMUSCULAR; INTRAVENOUS
Status: DISCONTINUED | OUTPATIENT
Start: 2017-09-28 | End: 2017-09-29 | Stop reason: HOSPADM

## 2017-09-28 RX ORDER — SODIUM CHLORIDE 0.9 % (FLUSH) 0.9 %
5-10 SYRINGE (ML) INJECTION AS NEEDED
Status: DISCONTINUED | OUTPATIENT
Start: 2017-09-28 | End: 2017-09-28 | Stop reason: HOSPADM

## 2017-09-28 RX ORDER — HYDROMORPHONE HYDROCHLORIDE 2 MG/ML
1 INJECTION, SOLUTION INTRAMUSCULAR; INTRAVENOUS; SUBCUTANEOUS
Status: DISCONTINUED | OUTPATIENT
Start: 2017-09-28 | End: 2017-09-29 | Stop reason: HOSPADM

## 2017-09-28 RX ORDER — DEXAMETHASONE SODIUM PHOSPHATE 4 MG/ML
INJECTION, SOLUTION INTRA-ARTICULAR; INTRALESIONAL; INTRAMUSCULAR; INTRAVENOUS; SOFT TISSUE AS NEEDED
Status: DISCONTINUED | OUTPATIENT
Start: 2017-09-28 | End: 2017-09-28 | Stop reason: HOSPADM

## 2017-09-28 RX ORDER — OXYCODONE AND ACETAMINOPHEN 5; 325 MG/1; MG/1
1 TABLET ORAL
Qty: 30 TAB | Refills: 0 | Status: SHIPPED | OUTPATIENT
Start: 2017-09-28 | End: 2017-10-23

## 2017-09-28 RX ORDER — SODIUM CHLORIDE 0.9 % (FLUSH) 0.9 %
5-10 SYRINGE (ML) INJECTION EVERY 8 HOURS
Status: DISCONTINUED | OUTPATIENT
Start: 2017-09-28 | End: 2017-09-29 | Stop reason: HOSPADM

## 2017-09-28 RX ORDER — GLYCOPYRROLATE 0.2 MG/ML
INJECTION INTRAMUSCULAR; INTRAVENOUS AS NEEDED
Status: DISCONTINUED | OUTPATIENT
Start: 2017-09-28 | End: 2017-09-28 | Stop reason: HOSPADM

## 2017-09-28 RX ORDER — KETOROLAC TROMETHAMINE 30 MG/ML
INJECTION, SOLUTION INTRAMUSCULAR; INTRAVENOUS AS NEEDED
Status: DISCONTINUED | OUTPATIENT
Start: 2017-09-28 | End: 2017-09-28 | Stop reason: HOSPADM

## 2017-09-28 RX ORDER — ONDANSETRON 2 MG/ML
4 INJECTION INTRAMUSCULAR; INTRAVENOUS
Status: COMPLETED | OUTPATIENT
Start: 2017-09-28 | End: 2017-09-28

## 2017-09-28 RX ORDER — DEXTROSE, SODIUM CHLORIDE, AND POTASSIUM CHLORIDE 5; .45; .15 G/100ML; G/100ML; G/100ML
75 INJECTION INTRAVENOUS CONTINUOUS
Status: DISCONTINUED | OUTPATIENT
Start: 2017-09-28 | End: 2017-09-29 | Stop reason: HOSPADM

## 2017-09-28 RX ORDER — OXYCODONE AND ACETAMINOPHEN 5; 325 MG/1; MG/1
1 TABLET ORAL
Status: DISCONTINUED | OUTPATIENT
Start: 2017-09-28 | End: 2017-09-29 | Stop reason: HOSPADM

## 2017-09-28 RX ORDER — SODIUM CHLORIDE 0.9 % (FLUSH) 0.9 %
5-10 SYRINGE (ML) INJECTION AS NEEDED
Status: DISCONTINUED | OUTPATIENT
Start: 2017-09-28 | End: 2017-09-29 | Stop reason: HOSPADM

## 2017-09-28 RX ORDER — NALOXONE HYDROCHLORIDE 0.4 MG/ML
0.1 INJECTION, SOLUTION INTRAMUSCULAR; INTRAVENOUS; SUBCUTANEOUS
Status: DISCONTINUED | OUTPATIENT
Start: 2017-09-28 | End: 2017-09-28 | Stop reason: HOSPADM

## 2017-09-28 RX ORDER — FAMOTIDINE 10 MG/ML
20 INJECTION INTRAVENOUS
Status: COMPLETED | OUTPATIENT
Start: 2017-09-28 | End: 2017-09-28

## 2017-09-28 RX ORDER — NEOSTIGMINE METHYLSULFATE 5 MG/5 ML
SYRINGE (ML) INTRAVENOUS AS NEEDED
Status: DISCONTINUED | OUTPATIENT
Start: 2017-09-28 | End: 2017-09-28 | Stop reason: HOSPADM

## 2017-09-28 RX ORDER — PROPOFOL 10 MG/ML
INJECTION, EMULSION INTRAVENOUS AS NEEDED
Status: DISCONTINUED | OUTPATIENT
Start: 2017-09-28 | End: 2017-09-28 | Stop reason: HOSPADM

## 2017-09-28 RX ORDER — SULFAMETHOXAZOLE AND TRIMETHOPRIM 800; 160 MG/1; MG/1
1 TABLET ORAL 2 TIMES DAILY
Qty: 14 TAB | Refills: 0 | Status: SHIPPED | OUTPATIENT
Start: 2017-09-28 | End: 2017-10-23

## 2017-09-28 RX ORDER — ROCURONIUM BROMIDE 10 MG/ML
INJECTION, SOLUTION INTRAVENOUS AS NEEDED
Status: DISCONTINUED | OUTPATIENT
Start: 2017-09-28 | End: 2017-09-28 | Stop reason: HOSPADM

## 2017-09-28 RX ORDER — MIDAZOLAM HYDROCHLORIDE 1 MG/ML
INJECTION, SOLUTION INTRAMUSCULAR; INTRAVENOUS AS NEEDED
Status: DISCONTINUED | OUTPATIENT
Start: 2017-09-28 | End: 2017-09-28 | Stop reason: HOSPADM

## 2017-09-28 RX ADMIN — Medication 3 MG: at 13:23

## 2017-09-28 RX ADMIN — PROPOFOL 200 MG: 10 INJECTION, EMULSION INTRAVENOUS at 12:53

## 2017-09-28 RX ADMIN — DEXTROSE MONOHYDRATE, SODIUM CHLORIDE, AND POTASSIUM CHLORIDE 75 ML/HR: 50; 4.5; 1.49 INJECTION, SOLUTION INTRAVENOUS at 16:28

## 2017-09-28 RX ADMIN — HYDROMORPHONE HYDROCHLORIDE 0.5 MG: 2 INJECTION INTRAMUSCULAR; INTRAVENOUS; SUBCUTANEOUS at 14:23

## 2017-09-28 RX ADMIN — SODIUM CHLORIDE 1000 ML: 900 INJECTION, SOLUTION INTRAVENOUS at 08:46

## 2017-09-28 RX ADMIN — HYDROMORPHONE HYDROCHLORIDE 1 MG: 2 INJECTION, SOLUTION INTRAMUSCULAR; INTRAVENOUS; SUBCUTANEOUS at 20:34

## 2017-09-28 RX ADMIN — FENTANYL CITRATE 100 MCG: 50 INJECTION, SOLUTION INTRAMUSCULAR; INTRAVENOUS at 12:53

## 2017-09-28 RX ADMIN — ROCURONIUM BROMIDE 35 MG: 10 INJECTION, SOLUTION INTRAVENOUS at 12:53

## 2017-09-28 RX ADMIN — IOPAMIDOL 100 ML: 612 INJECTION, SOLUTION INTRAVENOUS at 09:20

## 2017-09-28 RX ADMIN — CEFOXITIN SODIUM 1 G: 1 POWDER, FOR SOLUTION INTRAVENOUS at 16:28

## 2017-09-28 RX ADMIN — MIDAZOLAM HYDROCHLORIDE 2 MG: 1 INJECTION, SOLUTION INTRAMUSCULAR; INTRAVENOUS at 12:46

## 2017-09-28 RX ADMIN — KETOROLAC TROMETHAMINE 30 MG: 30 INJECTION, SOLUTION INTRAMUSCULAR; INTRAVENOUS at 13:20

## 2017-09-28 RX ADMIN — SODIUM CHLORIDE, SODIUM LACTATE, POTASSIUM CHLORIDE, AND CALCIUM CHLORIDE: 600; 310; 30; 20 INJECTION, SOLUTION INTRAVENOUS at 12:50

## 2017-09-28 RX ADMIN — ONDANSETRON HYDROCHLORIDE 4 MG: 2 INJECTION INTRAMUSCULAR; INTRAVENOUS at 08:46

## 2017-09-28 RX ADMIN — SODIUM CHLORIDE, SODIUM LACTATE, POTASSIUM CHLORIDE, AND CALCIUM CHLORIDE: 600; 310; 30; 20 INJECTION, SOLUTION INTRAVENOUS at 12:46

## 2017-09-28 RX ADMIN — Medication 10 ML: at 16:37

## 2017-09-28 RX ADMIN — DEXAMETHASONE SODIUM PHOSPHATE 4 MG: 4 INJECTION, SOLUTION INTRA-ARTICULAR; INTRALESIONAL; INTRAMUSCULAR; INTRAVENOUS; SOFT TISSUE at 13:05

## 2017-09-28 RX ADMIN — FENTANYL CITRATE 50 MCG: 50 INJECTION, SOLUTION INTRAMUSCULAR; INTRAVENOUS at 13:22

## 2017-09-28 RX ADMIN — KETOROLAC TROMETHAMINE 30 MG: 30 INJECTION, SOLUTION INTRAMUSCULAR; INTRAVENOUS at 08:46

## 2017-09-28 RX ADMIN — LIDOCAINE HYDROCHLORIDE 60 MG: 20 INJECTION, SOLUTION EPIDURAL; INFILTRATION; INTRACAUDAL; PERINEURAL at 12:53

## 2017-09-28 RX ADMIN — FENTANYL CITRATE 50 MCG: 50 INJECTION, SOLUTION INTRAMUSCULAR; INTRAVENOUS at 13:08

## 2017-09-28 RX ADMIN — ONDANSETRON 4 MG: 2 INJECTION INTRAMUSCULAR; INTRAVENOUS at 13:05

## 2017-09-28 RX ADMIN — PIPERACILLIN SODIUM,TAZOBACTAM SODIUM 4.5 G: 4; .5 INJECTION, POWDER, FOR SOLUTION INTRAVENOUS at 09:57

## 2017-09-28 RX ADMIN — HYDROMORPHONE HYDROCHLORIDE 1 MG: 2 INJECTION, SOLUTION INTRAMUSCULAR; INTRAVENOUS; SUBCUTANEOUS at 16:28

## 2017-09-28 RX ADMIN — GLYCOPYRROLATE 0.4 MG: 0.2 INJECTION INTRAMUSCULAR; INTRAVENOUS at 13:22

## 2017-09-28 RX ADMIN — FAMOTIDINE 20 MG: 10 INJECTION, SOLUTION INTRAVENOUS at 08:49

## 2017-09-28 NOTE — PERIOP NOTES
Patient transferred to [facility], room 315 via stretcher. Patient walked over to bed. Room air. PIV with IVF infusing per orders. Blood pressure 115/67, pulse 74, temperature 97.4 °F (36.3 °C), resp. rate 14, height 5' 11\" (1.803 m), weight 117.9 kg (260 lb), SpO2 100 %. Jayy Blair RN at bedside to accept patient.

## 2017-09-28 NOTE — H&P
History & Physical    Patient: Lorena Bishop MRN: [id number]  CSN: [id number]    YOB: 1990  Age: 31 y.o. Sex: male      DOA: 9/28/2017       HPI:     Lorena Bishop is a 31 y.o. male who presents to the ER with abd pain, N/V/D, ct scan shows appendicitis, 12 mm, dilated, not perforated. Past Medical History:   Diagnosis Date    Asthma        History reviewed. No pertinent surgical history. History reviewed. No pertinent family history. Social History     Social History    Marital status: SINGLE     Spouse name: N/A    Number of children: N/A    Years of education: N/A     Social History Main Topics    Smoking status: Never Smoker    Smokeless tobacco: Never Used    Alcohol use Yes      Comment: socially     Drug use: No    Sexual activity: Not Asked     Other Topics Concern    None     Social History Narrative       Prior to Admission medications    Not on File       No Known Allergies    Review of Systems  no CP, SOB, urinary sx, sz, spututm production,       Physical Exam:      Visit Vitals    /72 (BP 1 Location: Left arm, BP Patient Position: At rest)    Pulse 68    Temp 98.1 °F (36.7 °C)    Resp 16    Ht 5' 11\" (1.803 m)    Wt 117.9 kg (260 lb)    SpO2 100%    BMI 36.26 kg/m2       Physical Exam:  a little agitated, sclera clear, skin warm and dry, nl resp effort, no cough, RRR, abd tender RLQ, no ext edema, neuro grossly nl    Labs Reviewed: All lab results for the last 24 hours reviewed.     Assessment/Plan     Active Problems:    Appendicitis (9/28/2017)        IV abx and lap karolyn Byrne MD  September 28, 2017

## 2017-09-28 NOTE — IP AVS SNAPSHOT
[address] [place] [id number] 
[phone] Patient: Ginna Lansdale MRN: [id number] :1990 Current Discharge Medication List  
  
START taking these medications Dose & Instructions Dispensing Information Comments Morning Noon Evening Bedtime  
 oxyCODONE-acetaminophen 5-325 mg per tablet Commonly known as:  PERCOCET Your last dose was: Your next dose is:    
   
   
 Dose:  1 Tab Take 1 Tab by mouth every four (4) hours as needed for Pain. Max Daily Amount: 6 Tabs. Quantity:  30 Tab Refills:  0  
     
   
   
   
  
 trimethoprim-sulfamethoxazole 160-800 mg per tablet Commonly known as:  BACTRIM DS, SEPTRA DS Your last dose was: Your next dose is:    
   
   
 Dose:  1 Tab Take 1 Tab by mouth two (2) times a day. Quantity:  14 Tab Refills:  0 Where to Get Your Medications Information on where to get these meds will be given to you by the nurse or doctor. ! Ask your nurse or doctor about these medications  
  oxyCODONE-acetaminophen 5-325 mg per tablet  
 trimethoprim-sulfamethoxazole 160-800 mg per tablet

## 2017-09-28 NOTE — PROGRESS NOTES
TRANSFER - IN REPORT:    Verbal report received from OR nurse and CRNA(name) on Ysabel Enciso  being received from OR(unit) for routine post - op      Report consisted of patients Situation, Background, Assessment and   Recommendations(SBAR). Information from the following report(s) SBAR, OR Summary, Procedure Summary, Intake/Output, MAR and Recent Results was reviewed with the receiving nurse. Opportunity for questions and clarification was provided. Assessment completed upon patients arrival to unit and care assumed.

## 2017-09-28 NOTE — ED NOTES
CHG wipes provided to patient for pre-op cleansing. Nasal cleansing complete with antiseptic swabs. OR physician at the bedside to explain procedure.

## 2017-09-28 NOTE — OP NOTES
[address] [place] [address]  OPERATIVE REPORT    Name:  Marco A Prince  MR#:  [id number]  :  1990  Account #:  [de-identified]  Date of Adm:  2017  Date of Surgery:  2017      PREOPERATIVE DIAGNOSIS: Acute appendicitis. POSTOPERATIVE DIAGNOSIS: Acute appendicitis. PROCEDURES PERFORMED: Laparoscopic appendectomy. ESTIMATED BLOOD LOSS: 5 mL. SPECIMENS REMOVED: Appendix. ANESTHESIA: General and local.    ATTENDING SURGEON: Golden Hdez MD    INDICATIONS FOR PROCEDURE: This is a 65-year-old male with  abdominal pain, nausea, vomiting, and diarrhea, and a CT scan  showing a dilated appendix, and he was brought to the operating room  for appendectomy. DESCRIPTION OF PROCEDURE: The patient was brought in the  operating room, placed on the table in the supine position. After  placing monitors and adequate general anesthesia, the abdomen was  shaved, prepped and draped in the usual sterile fashion. SCD hose  were placed and the patient got IV antibiotics preoperatively. A small  horizontal incision was made superior to the umbilicus through the skin  down to subcutaneous tissue. A Veress needle was placed in the  abdomen. It was insufflated with carbon dioxide to 15 mmHg pressure. A 12 mm port was placed at this location, and the scope was placed  into the peritoneal cavity. A 5 mm port was placed in the right and left  lower quadrant. The appendix was identified and it was dilated, but did  not look very inflamed. The mesoappendix was divided with the  Harmonic scalpel down to the appendiceal base. A 0 PDS Endoloop  was used to secure the appendix and then a stapler was used to divide  the appendix at the appendiceal base at the cecum. The appendix was  placed in an Endopouch and brought out the umbilical port. The right  lower quadrant was irrigated and there was no active bleeding and the  staple line was intact. The ports were removed under direct  visualization. There was no active bleeding. Fascial defect at the  umbilicus was closed with interrupted 0 Vicryl suture. Marcaine was  injected locally and 4-0 Monocryl was used to reapproximate the skin. Steri-Strips were applied and the wounds were dressed sterilely. The  sponge, instrument, and needle count was correct at the end of the  procedure.         MD TERA Hutson  D:  09/28/2017   14:02  T:  09/28/2017   14:19  Job #:  [id number]

## 2017-09-28 NOTE — PROGRESS NOTES
Pt admitted due to abdominal pain due to appendicitis. Plan IVABX and surgical intervention. Pt is independent. Please encourage ambulation. No plan of care needs identified. Anticipate pt will be medically stable for discharge within the next 24-48 hours. CM available to assist as needed. Discharge Reassessment Plan:  Low Risk    RRAT Score:  1 - 12     Low Risk Care Transition Interventions:  1. Discharge transition plan:  Physician follow up   2. Involved patient/caregiver in assessment, planning, education and implement of intervention. 3. CM daily patient care huddles/interdisciplinary rounds were completed. 4. PCP/Specialist appointment within 5 days made prior to discharge. Date/Time  5. Facilitated transportation and logistics for follow-up appointments. 6. Handoff to [address] Nurse Navigator or PCP practice. Care Management Interventions  Mode of Transport at Discharge:  Other (see comment) (Family)  Transition of Care Consult (CM Consult): Discharge Planning  Health Maintenance Reviewed: Yes  Current Support Network: Family Lives Nearby  Confirm Follow Up Transport: Self  Discharge Location  Discharge Placement: Home

## 2017-09-28 NOTE — ANESTHESIA POSTPROCEDURE EVALUATION
Post-Anesthesia Evaluation and Assessment    Cardiovascular Function/Vital Signs  Visit Vitals    /64    Pulse 71    Temp 36.4 °C (97.6 °F)    Resp 11    Ht 5' 11\" (1.803 m)    Wt 117.9 kg (260 lb)    SpO2 98%    BMI 36.26 kg/m2       Patient is status post Procedure(s):  APPENDECTOMY LAPAROSCOPIC. Nausea/Vomiting: Controlled. Postoperative hydration reviewed and adequate. Pain:  Pain Scale 1: Numeric (0 - 10) (09/28/17 1446)  Pain Intensity 1: 2 (09/28/17 1446)   Managed. Neurological Status:   Neuro (WDL): Within Defined Limits (09/28/17 1446)   At baseline. Mental Status and Level of Consciousness: Arousable. Pulmonary Status:   O2 Device: Room air (09/28/17 1446)   Adequate oxygenation and airway patent. Complications related to anesthesia: None    Post-anesthesia assessment completed. No concerns. Patient has met all discharge requirements.     Signed By: Isom Apley, CRNA    September 28, 2017

## 2017-09-28 NOTE — ED TRIAGE NOTES
Pt states he has had vomiting and diarrhea all night and \"I fell out in the floor\"  Pt says he might have passed out but he stayed on the floor and slept for about an hour;

## 2017-09-28 NOTE — IP AVS SNAPSHOT
Blain Kehr 
 
 
 [address] [facility] [address] 
[phone] Patient: Gracia Copper MRN: [id number] :1990 You are allergic to the following No active allergies Recent Documentation Height Weight BMI Smoking Status 1.803 m 117.9 kg 36.26 kg/m2 Never Smoker Emergency Contacts Name Discharge Info Relation Home Work Mobile Marlena Durbin CAREGIVER [3] Parent [1] [phone] About your hospitalization You were admitted on:  2017 You last received care in the:  [address] [place] [address] You were discharged on:  2017 Unit phone number:  [phone] Why you were hospitalized Your primary diagnosis was:  Not on File Your diagnoses also included:  Appendicitis Providers Seen During Your Hospitalizations Provider Role Specialty Primary office phone Missy Sevilla MD Attending Provider Emergency Medicine [phone] Juan Johnson MD Attending Provider General Surgery [phone] Your Primary Care Physician (PCP) Primary Care Physician Office Phone Office Fax NONE ** None ** ** None ** Follow-up Information Follow up With Details Comments Contact Info None   None ([phone]) Patient stated that they have no PCP Juan Johnson MD Schedule an appointment as soon as possible for a visit in 1 week  [address] [place] [address] 
[phone] Current Discharge Medication List  
  
START taking these medications Dose & Instructions Dispensing Information Comments Morning Noon Evening Bedtime  
 oxyCODONE-acetaminophen 5-325 mg per tablet Commonly known as:  PERCOCET Your last dose was: Your next dose is:    
   
   
 Dose:  1 Tab Take 1 Tab by mouth every four (4) hours as needed for Pain. Max Daily Amount: 6 Tabs. Quantity:  30 Tab Refills:  0  
     
   
   
   
  
 trimethoprim-sulfamethoxazole 160-800 mg per tablet Commonly known as:  BACTRIM DS, SEPTRA DS Your last dose was: Your next dose is:    
   
   
 Dose:  1 Tab Take 1 Tab by mouth two (2) times a day. Quantity:  14 Tab Refills:  0 Where to Get Your Medications Information on where to get these meds will be given to you by the nurse or doctor. ! Ask your nurse or doctor about these medications  
  oxyCODONE-acetaminophen 5-325 mg per tablet  
 trimethoprim-sulfamethoxazole 160-800 mg per tablet Discharge Instructions DISCHARGE SUMMARY from Nurse The following personal items are in your possession at time of discharge: 
 
Dental Appliances: None Visual Aid: None PATIENT INSTRUCTIONS: 
 
 
F-face looks uneven A-arms unable to move or move unevenly S-speech slurred or non-existent T-time-call 911 as soon as signs and symptoms begin-DO NOT go Back to bed or wait to see if you get better-TIME IS BRAIN. Warning Signs of HEART ATTACK Call 911 if you have these symptoms: 
? Chest discomfort. Most heart attacks involve discomfort in the center of the chest that lasts more than a few minutes, or that goes away and comes back. It can feel like uncomfortable pressure, squeezing, fullness, or pain. ? Discomfort in other areas of the upper body. Symptoms can include pain or discomfort in one or both arms, the back, neck, jaw, or stomach. ? Shortness of breath with or without chest discomfort. ? Other signs may include breaking out in a cold sweat, nausea, or lightheadedness. Don't wait more than five minutes to call [address]! Fast action can save your life.  Calling 911 is almost always the fastest way to get lifesaving treatment. Emergency Medical Services staff can begin treatment when they arrive  up to an hour sooner than if someone gets to the hospital by car. The discharge information has been reviewed with the patient. The patient verbalized understanding. Discharge medications reviewed with the patient and appropriate educational materials and side effects teaching were provided. Patient armband removed and shredded Discharge Orders None Introducing [facility] SERVICES! Leticia Tatyana introduces wiMAN patient portal. Now you can access parts of your medical record, email your doctor's office, and request medication refills online. 1. In your internet browser, go to [email or web address] 2. Click on the First Time User? Click Here link in the Sign In box. You will see the New Member Sign Up page. 3. Enter your wiMAN Access Code exactly as it appears below. You will not need to use this code after youve completed the sign-up process. If you do not sign up before the expiration date, you must request a new code. · wiMAN Access Code: [email or web address] Expires: 11/3/2017  5:45 AM 
 
4. Enter the last four digits of your Social Security Number (xxxx) and Date of Birth (mm/dd/yyyy) as indicated and click Submit. You will be taken to the next sign-up page. 5. Create a wiMAN ID. This will be your wiMAN login ID and cannot be changed, so think of one that is secure and easy to remember. 6. Create a wiMAN password. You can change your password at any time. 7. Enter your Password Reset Question and Answer. This can be used at a later time if you forget your password. 8. Enter your e-mail address. You will receive e-mail notification when new information is available in [address] 9. Click Sign Up. You can now view and download portions of your medical record.  
10. Click the Download Summary menu link to download a portable copy of your medical information. If you have questions, please visit the Frequently Asked Questions section of the [employer] website. Remember, MyChart is NOT to be used for urgent needs. For medical emergencies, dial 911. Now available from your iPhone and Android! General Information Please provide this summary of care documentation to your next provider. Patient Signature:  ____________________________________________________________ Date:  ____________________________________________________________  
  
Deepa Artist Provider Signature:  ____________________________________________________________ Date:  ____________________________________________________________

## 2017-09-28 NOTE — ANESTHESIA PREPROCEDURE EVALUATION
Anesthetic History   No history of anesthetic complications            Review of Systems / Medical History  Patient summary reviewed, nursing notes reviewed and pertinent labs reviewed    Pulmonary  Within defined limits            Pertinent negatives: No asthma     Neuro/Psych   Within defined limits           Cardiovascular  Within defined limits                Exercise tolerance: >4 METS     GI/Hepatic/Renal  Within defined limits              Endo/Other  Within defined limits           Other Findings              Physical Exam    Airway  Mallampati: II  TM Distance: 4 - 6 cm  Neck ROM: normal range of motion   Mouth opening: Normal     Cardiovascular               Dental  No notable dental hx       Pulmonary                 Abdominal         Other Findings            Anesthetic Plan    ASA: 1, emergent  Anesthesia type: general          Induction: Intravenous  Anesthetic plan and risks discussed with: Patient and Mother

## 2017-09-28 NOTE — ED PROVIDER NOTES
reviewed. Constitutional: Diaphoretic, in mild distress  Head: Normocephalic, Atraumatic  Neck: Supple  Cardiovascular: Regular rate and rhythm, no murmurs, rubs, or gallops  Chest: Normal work of breathing and chest excursion bilaterally  Lungs: Clear to ausculation bilaterally  Abdomen: Soft, tender to the periumbilical area and RLQ, non distended, normoactive bowel sounds  Back: No evidence of trauma or deformity  Extremities: No evidence of trauma or deformity, no LE edema  Skin: Warm and dry  Neuro: Alert and appropriate  Psychiatric: Normal mood and affect     Diagnostic Study Results     Labs -      Recent Results (from the past 12 hour(s))   CBC WITH AUTOMATED DIFF    Collection Time: 09/28/17  8:38 AM   Result Value Ref Range    WBC 3.4 (L) 4.6 - 13.2 K/uL    RBC 5.11 4.70 - 5.50 M/uL    HGB 14.6 13.0 - 16.0 g/dL    HCT 43.7 36.0 - 48.0 %    MCV 85.5 74.0 - 97.0 FL    MCH 28.6 24.0 - 34.0 PG    MCHC 33.4 31.0 - 37.0 g/dL    RDW 13.6 11.6 - 14.5 %    PLATELET 872 811 - 683 K/uL    MPV 10.7 9.2 - 11.8 FL    NEUTROPHILS 66 40 - 73 %    LYMPHOCYTES 24 21 - 52 %    MONOCYTES 9 3 - 10 %    EOSINOPHILS 1 0 - 5 %    BASOPHILS 0 0 - 2 %    ABS. NEUTROPHILS 2.3 1.8 - 8.0 K/UL    ABS. LYMPHOCYTES 0.8 (L) 0.9 - 3.6 K/UL    ABS. MONOCYTES 0.3 0.05 - 1.2 K/UL    ABS. EOSINOPHILS 0.0 0.0 - 0.4 K/UL    ABS.  BASOPHILS 0.0 0.0 - 0.06 K/UL    DF AUTOMATED     METABOLIC PANEL, COMPREHENSIVE    Collection Time: 09/28/17  8:38 AM   Result Value Ref Range    Sodium 140 136 - 145 mmol/L    Potassium 4.1 3.5 - 5.5 mmol/L    Chloride 103 100 - 108 mmol/L    CO2 28 21 - 32 mmol/L    Anion gap 9 3.0 - 18 mmol/L    Glucose 106 (H) 74 - 99 mg/dL    BUN 9 7.0 - 18 MG/DL    Creatinine 1.15 0.6 - 1.3 MG/DL    BUN/Creatinine ratio 8 (L) 12 - 20      GFR est AA >60 >60 ml/min/1.73m2    GFR est non-AA >60 >60 ml/min/1.73m2    Calcium 9.0 8.5 - 10.1 MG/DL    Bilirubin, total 0.5 0.2 - 1.0 MG/DL    ALT (SGPT) 27 16 - 61 U/L    AST (SGOT) 24 15 - 37 U/L    Alk. phosphatase 70 45 - 117 U/L    Protein, total 8.4 (H) 6.4 - 8.2 g/dL    Albumin 4.0 3.4 - 5.0 g/dL    Globulin 4.4 (H) 2.0 - 4.0 g/dL    A-G Ratio 0.9 0.8 - 1.7     LIPASE    Collection Time: 09/28/17  8:38 AM   Result Value Ref Range    Lipase 159 73 - 393 U/L   CARDIAC PANEL,(CK, CKMB & TROPONIN)    Collection Time: 09/28/17  8:38 AM   Result Value Ref Range     (H) 39 - 308 U/L    CK - MB <1.0 <3.6 ng/ml    CK-MB Index  0.0 - 4.0 %     CALCULATION NOT PERFORMED WHEN RESULT IS BELOW LINEAR LIMIT    Troponin-I, Qt. <0.02 0.00 - 0.06 NG/ML   URINALYSIS W/ RFLX MICROSCOPIC    Collection Time: 09/28/17  8:40 AM   Result Value Ref Range    Color YELLOW      Appearance CLEAR      Specific gravity 1.022 1.005 - 1.030      pH (UA) 7.5 5.0 - 8.0      Protein TRACE (A) NEG mg/dL    Glucose NEGATIVE  NEG mg/dL    Ketone 15 (A) NEG mg/dL    Bilirubin NEGATIVE  NEG      Blood TRACE (A) NEG      Urobilinogen 1.0 0.2 - 1.0 EU/dL    Nitrites NEGATIVE  NEG      Leukocyte Esterase TRACE (A) NEG     URINE MICROSCOPIC ONLY    Collection Time: 09/28/17  8:40 AM   Result Value Ref Range    WBC 0 to 1 0 - 5 /hpf    RBC 0 to 1 0 - 5 /hpf    Bacteria FEW (A) NEG /hpf   EKG, 12 LEAD, INITIAL    Collection Time: 09/28/17  8:47 AM   Result Value Ref Range    Ventricular Rate 55 BPM    Atrial Rate 55 BPM    P-R Interval 148 ms    QRS Duration 116 ms    Q-T Interval 476 ms    QTC Calculation (Bezet) 455 ms    Calculated P Axis 54 degrees    Calculated R Axis 67 degrees    Calculated T Axis 62 degrees    Diagnosis       Sinus bradycardia with sinus arrhythmia  Otherwise normal ECG  When compared with ECG of 08-SEP-2017 22:14,  No significant change was found         Radiologic Studies -  The following have been ordered and reviewed:  CT ABD PELV W CONT   Final Result   IMPRESSION:     1.  CT findings in keeping with acute uncomplicated appendicitis.   As read by the radiologist.        Medical Decision Making   I am the first provider for this patient. I reviewed the vital signs, available nursing notes, past medical history, past surgical history, family history and social history. Vital Signs-Reviewed the patient's vital signs. Patient Vitals for the past 12 hrs:   Temp Pulse Resp BP SpO2   09/28/17 0811 98.1 °F (36.7 °C) 68 16 127/72 100 %       Pulse Oximetry Analysis - Normal 100% on room air     EKG interpretation: (Preliminary)  8:47 AM  Sinus bradycardia with sinus arrhythmia. Rate 55 bpm. QRS duration 116 ms. EKG read by Vic Cortés MD at 8:51 AM     Old Medical Records: Old medical records. Nursing notes. Previous radiology studies. Procedures:   Procedures    ED Course:  8:11 AM  Initial assessment performed. The patients presenting problems have been discussed, and they are in agreement with the care plan formulated and outlined with them. I have encouraged them to ask questions as they arise throughout their visit. 9:59 AM Discussed patient's history, exam, and available diagnostics results with Bart Cruz MD, general surgeon, who will take the patient up to the OR.    10:00 AM  Patient is being admitted to the hospital by Bart Cruz MD to surgical. The results of their tests and reasons for their admission have been discussed with them and/or available family. They convey agreement and understanding for the need to be admitted and for their admission diagnosis. 10:11 AM Bart Cruz MD is in the ED. Patient has consented to surgery. CONDITIONS ON ADMISSION:  Deep Vein Thrombosis is not present at the time of admission. Thrombosis is not present at the time of admission. Urinary Tract Infection is not present at the time of admission. Pneumonia is not present at the time of admission. MRSA is not present at the time of admission. Wound infection is not present at the time of admission. Pressure Ulcer is not present at the time of admission.       Medications Given in the ED:  Medications   piperacillin-tazobactam (ZOSYN) 4.5 g in 0.9% sodium chloride (MBP/ADV) 100 mL MBP (4.5 g IntraVENous New Bag 9/28/17 0957)   lactated Ringers infusion (not administered)   morphine injection 8 mg (not administered)   sodium chloride 0.9 % bolus infusion 1,000 mL (0 mL IntraVENous IV Completed 9/28/17 1013)   ondansetron (ZOFRAN) injection 4 mg (4 mg IntraVENous Given 9/28/17 0846)   famotidine (PF) (PEPCID) injection 20 mg (20 mg IntraVENous Given 9/28/17 0849)   ketorolac (TORADOL) injection 30 mg (30 mg IntraVENous Given 9/28/17 0846)   iopamidol (ISOVUE 300) 61 % contrast injection 100 mL (100 mL IntraVENous Given 9/28/17 0920)     Diagnosis   Clinical Impression:   1. Acute appendicitis, unspecified acute appendicitis type         Discussion:  31year old male with acute uncomplicated appendicitis. IV antibiotics initiated. Discussed with general surgery. Plan for further inpatient management.     _______________________________   Attestations: This note is prepared by Christian Paulson, acting as a Scribe for Vic Cortés MD on 8:05 AM on 9/28/2017 . Vic Cortés MD: The scribe's documentation has been prepared under my direction and personally reviewed by me in its entirety.   _______________________________

## 2017-09-29 VITALS
SYSTOLIC BLOOD PRESSURE: 110 MMHG | BODY MASS INDEX: 36.4 KG/M2 | WEIGHT: 260 LBS | RESPIRATION RATE: 16 BRPM | HEIGHT: 71 IN | OXYGEN SATURATION: 99 % | DIASTOLIC BLOOD PRESSURE: 61 MMHG | TEMPERATURE: 97.4 F | HEART RATE: 64 BPM

## 2017-09-29 PROCEDURE — 74011250637 HC RX REV CODE- 250/637: Performed by: SURGERY

## 2017-09-29 PROCEDURE — 74011000258 HC RX REV CODE- 258: Performed by: SURGERY

## 2017-09-29 PROCEDURE — 99218 HC RM OBSERVATION: CPT

## 2017-09-29 PROCEDURE — 74011250636 HC RX REV CODE- 250/636: Performed by: SURGERY

## 2017-09-29 RX ADMIN — CEFOXITIN SODIUM 1 G: 1 POWDER, FOR SOLUTION INTRAVENOUS at 00:59

## 2017-09-29 RX ADMIN — OXYCODONE HYDROCHLORIDE AND ACETAMINOPHEN 1 TABLET: 5; 325 TABLET ORAL at 02:46

## 2017-09-29 RX ADMIN — OXYCODONE HYDROCHLORIDE AND ACETAMINOPHEN 1 TABLET: 5; 325 TABLET ORAL at 06:52

## 2017-09-29 NOTE — PROGRESS NOTES
Progress Note    Patient: Leti Jasso MRN: [id number]  CSN: [id number]    YOB: 1990  Age: 31 y.o. Sex: male    DOA: 9/28/2017 LOS:  LOS: 0 days                    Subjective:     Some post surgical pain    Objective:      Visit Vitals    /61 (BP 1 Location: Left arm, BP Patient Position: At rest)    Pulse 64    Temp 97.4 °F (36.3 °C)    Resp 16    Ht 5' 11\" (1.803 m)    Wt 117.9 kg (260 lb)    SpO2 99%    BMI 36.26 kg/m2       Physical Exam:  abd sl tender, dressings dry    Intake and Output:  Current Shift:  09/28 1901 - 09/29 0700  In: 100 [I.V.:100]  Out: 1500 [Urine:1500]  Last three shifts:  09/27 0701 - 09/28 1900  In: 1000 [I.V.:1000]  Out: -     Labs: Results:       Chemistry Recent Labs      09/28/17   0838   GLU  106*   NA  140   K  4.1   CL  103   CO2  28   BUN  9   CREA  1.15   CA  9.0   AGAP  9   BUCR  8*   AP  70   TP  8.4*   ALB  4.0   GLOB  4.4*   AGRAT  0.9      CBC w/Diff Recent Labs      09/28/17   0838   WBC  3.4*   RBC  5.11   HGB  14.6   HCT  43.7   PLT  249   GRANS  66   LYMPH  24   EOS  1      Cardiac Enzymes Recent Labs      09/28/17   0838   CPK  376*   CKND1  CALCULATION NOT PERFORMED WHEN RESULT IS BELOW LINEAR LIMIT      Coagulation No results for input(s): PTP, INR, APTT in the last 72 hours. No lab exists for component: INREXT    Lipid Panel No results found for: CHOL, CHOLPOCT, CHOLX, CHLST, CHOLV, 387835, HDL, LDL, LDLC, DLDLP, 444284, VLDLC, VLDL, TGLX, TRIGL, TRIGP, TGLPOCT, CHHD, CHHDX   BNP No results for input(s): BNPP in the last 72 hours.    Liver Enzymes Recent Labs      09/28/17   0838   TP  8.4*   ALB  4.0   AP  70   SGOT  24      Thyroid Studies No results found for: T4, T3U, TSH, TSHEXT         Medications Reviewed      Assessment/Plan     Active Problems:    Appendicitis (9/28/2017)        Discharge home, f/u 1 week

## 2017-09-29 NOTE — PROGRESS NOTES
Bedside and Verbal shift change report given to KHURRAM Wang RN (oncoming nurse) by Kevin Ramos RN (offgoing nurse). Report included the following information SBAR and Kardex.      Patient Vitals for the past 12 hrs:   Temp Pulse Resp BP SpO2   09/28/17 1833 97.6 °F (36.4 °C) 85 16 113/53 100 %   09/28/17 1730 97.2 °F (36.2 °C) 80 16 104/53 100 %   09/28/17 1626 97.6 °F (36.4 °C) 90 16 101/48 100 %   09/28/17 1526 97.4 °F (36.3 °C) 74 14 115/67 100 %   09/28/17 1505 - 66 13 111/60 100 %   09/28/17 1500 98.2 °F (36.8 °C) 71 13 108/65 97 %   09/28/17 1455 - 71 11 118/64 98 %   09/28/17 1450 - 68 13 112/71 98 %   09/28/17 1445 - 71 11 109/63 99 %   09/28/17 1440 - 69 10 110/61 99 %   09/28/17 1435 - 67 11 109/62 98 %   09/28/17 1430 - 76 13 114/59 99 %   09/28/17 1425 - 76 12 116/75 100 %   09/28/17 1420 - (!) 54 12 109/71 100 %   09/28/17 1415 - 65 11 109/70 100 %   09/28/17 1410 - (!) 52 12 113/64 100 %   09/28/17 1405 - (!) 51 14 110/65 100 %   09/28/17 1400 - (!) 59 12 117/66 100 %   09/28/17 1355 - (!) 59 11 111/59 100 %   09/28/17 1350 - 75 16 121/72 100 %   09/28/17 1345 - 75 18 127/64 100 %   09/28/17 1342 97.6 °F (36.4 °C) 84 12 123/72 100 %   09/28/17 1341 - 83 11 - 100 %   09/28/17 1340 - - - 123/72 -   09/28/17 0811 98.1 °F (36.7 °C) 68 16 127/72 100 %

## 2017-09-29 NOTE — PROGRESS NOTES
Shift summary: No acute events overnight. Abdominal lap sites x 3 with dressings, CDI. Reported abdominal pain, dilaudid and percocet given PRN with relief. Tolerated crackers. Ambulated hallways with no assistance, tolerated well. Voided clear yellow urine.     Patient Vitals for the past 12 hrs:   Temp Pulse Resp BP SpO2   09/29/17 0349 97.4 °F (36.3 °C) 64 16 110/61 99 %   09/28/17 2238 97.9 °F (36.6 °C) 82 16 122/84 100 %   09/28/17 2004 97.4 °F (36.3 °C) 83 16 128/82 100 %   09/28/17 1833 97.6 °F (36.4 °C) 85 16 113/53 100 %

## 2017-09-29 NOTE — ROUTINE PROCESS
Bedside and Verbal shift change report given to Julieta Pinzon RN (oncoming nurse) by Varsha Ortiz RN   (offgoing nurse). Report included the following information SBAR, Kardex, Procedure Summary and MAR.

## 2017-09-29 NOTE — DISCHARGE INSTRUCTIONS
DISCHARGE SUMMARY from Nurse    The following personal items are in your possession at time of discharge:    Dental Appliances: None  Visual Aid: None                            PATIENT INSTRUCTIONS:    After general anesthesia or intravenous sedation, for 24 hours or while taking prescription Narcotics:  · Limit your activities  · Do not drive and operate hazardous machinery  · Do not make important personal or business decisions  · Do  not drink alcoholic beverages  · If you have not urinated within 8 hours after discharge, please contact your surgeon on call. Report the following to your surgeon:  · Excessive pain, swelling, redness or odor of or around the surgical area  · Temperature over 100.5  · Nausea and vomiting lasting longer than 4 hours or if unable to take medications  · Any signs of decreased circulation or nerve impairment to extremity: change in color, persistent  numbness, tingling, coldness or increase pain  · Any questions        What to do at Home:  Recommended activity: Activity as tolerated      *  Please give a list of your current medications to your Primary Care Provider. *  Please update this list whenever your medications are discontinued, doses are      changed, or new medications (including over-the-counter products) are added. *  Please carry medication information at all times in case of emergency situations. These are general instructions for a healthy lifestyle:    No smoking/ No tobacco products/ Avoid exposure to second hand smoke    Surgeon General's Warning:  Quitting smoking now greatly reduces serious risk to your health.     Obesity, smoking, and sedentary lifestyle greatly increases your risk for illness    A healthy diet, regular physical exercise & weight monitoring are important for maintaining a healthy lifestyle    You may be retaining fluid if you have a history of heart failure or if you experience any of the following symptoms:  Weight gain of 3 pounds or more overnight or 5 pounds in a week, increased swelling in our hands or feet or shortness of breath while lying flat in bed. Please call your doctor as soon as you notice any of these symptoms; do not wait until your next office visit. Recognize signs and symptoms of STROKE:    F-face looks uneven    A-arms unable to move or move unevenly    S-speech slurred or non-existent    T-time-call 911 as soon as signs and symptoms begin-DO NOT go       Back to bed or wait to see if you get better-TIME IS BRAIN. Warning Signs of HEART ATTACK     Call 911 if you have these symptoms:   Chest discomfort. Most heart attacks involve discomfort in the center of the chest that lasts more than a few minutes, or that goes away and comes back. It can feel like uncomfortable pressure, squeezing, fullness, or pain.  Discomfort in other areas of the upper body. Symptoms can include pain or discomfort in one or both arms, the back, neck, jaw, or stomach.  Shortness of breath with or without chest discomfort.  Other signs may include breaking out in a cold sweat, nausea, or lightheadedness. Don't wait more than five minutes to call 911 - MINUTES MATTER! Fast action can save your life. Calling 911 is almost always the fastest way to get lifesaving treatment. Emergency Medical Services staff can begin treatment when they arrive -- up to an hour sooner than if someone gets to the hospital by car. The discharge information has been reviewed with the patient. The patient verbalized understanding. Discharge medications reviewed with the patient and appropriate educational materials and side effects teaching were provided.     Patient armband removed and shredded

## 2017-09-29 NOTE — ROUTINE PROCESS
Bedside and Verbal shift change report given to MARYURI Raman RN (oncoming nurse) by Maximino Syed RN (offgoing nurse). Report included the following information SBAR, Kardex, Intake/Output and MAR.

## 2017-09-29 NOTE — PROGRESS NOTES
0815-Alert and oriented x 3. IV to right AC, capped off. Lungs CTA. BS active x 4 quads. Voiding without difficulty. Ambulating to bathroom independently. Abdominal lap sites x 3, CDI. Dual AVS reviewed with Josefina GALVIN. All medications reviewed individually with patient. Opportunities for questions and concerns provided. Patient discharged via (mode of transport ie. Car, ambulance or air transport) car. Patient's arm band appropriately discarded.

## 2017-10-04 LAB
ATRIAL RATE: 55 BPM
CALCULATED P AXIS, ECG09: 54 DEGREES
CALCULATED R AXIS, ECG10: 67 DEGREES
CALCULATED T AXIS, ECG11: 62 DEGREES
DIAGNOSIS, 93000: NORMAL
P-R INTERVAL, ECG05: 148 MS
Q-T INTERVAL, ECG07: 476 MS
QRS DURATION, ECG06: 116 MS
QTC CALCULATION (BEZET), ECG08: 455 MS
VENTRICULAR RATE, ECG03: 55 BPM

## 2017-10-06 ENCOUNTER — HOSPITAL ENCOUNTER (EMERGENCY)
Age: 27
Discharge: STILL ADMITTED OR ADMINISTRATIVE | End: 2017-10-06
Attending: FAMILY MEDICINE
Payer: SELF-PAY

## 2017-10-06 PROCEDURE — 75810000275 HC EMERGENCY DEPT VISIT NO LEVEL OF CARE

## 2017-10-23 ENCOUNTER — HOSPITAL ENCOUNTER (EMERGENCY)
Age: 27
Discharge: HOME | End: 2017-10-23
Attending: EMERGENCY MEDICINE
Payer: SELF-PAY

## 2017-10-23 ENCOUNTER — APPOINTMENT (OUTPATIENT)
Dept: CT IMAGING | Age: 27
End: 2017-10-23
Attending: NURSE PRACTITIONER
Payer: SELF-PAY

## 2017-10-23 VITALS
OXYGEN SATURATION: 100 % | BODY MASS INDEX: 35 KG/M2 | HEART RATE: 64 BPM | WEIGHT: 250 LBS | HEIGHT: 71 IN | DIASTOLIC BLOOD PRESSURE: 77 MMHG | SYSTOLIC BLOOD PRESSURE: 128 MMHG | TEMPERATURE: 97.6 F | RESPIRATION RATE: 16 BRPM

## 2017-10-23 DIAGNOSIS — R10.84 ABDOMINAL PAIN, GENERALIZED: Primary | ICD-10-CM

## 2017-10-23 LAB
ALBUMIN SERPL-MCNC: 3.6 G/DL (ref 3.4–5)
ALBUMIN/GLOB SERPL: 0.9 {RATIO} (ref 0.8–1.7)
ALP SERPL-CCNC: 66 U/L (ref 45–117)
ALT SERPL-CCNC: 27 U/L (ref 16–61)
ANION GAP SERPL CALC-SCNC: 4 MMOL/L (ref 3–18)
APPEARANCE UR: CLEAR
AST SERPL-CCNC: 24 U/L (ref 15–37)
BASOPHILS # BLD: 0 K/UL (ref 0–0.06)
BASOPHILS NFR BLD: 1 % (ref 0–2)
BILIRUB SERPL-MCNC: 0.4 MG/DL (ref 0.2–1)
BILIRUB UR QL: NEGATIVE
BUN SERPL-MCNC: 10 MG/DL (ref 7–18)
BUN/CREAT SERPL: 10 (ref 12–20)
CALCIUM SERPL-MCNC: 9 MG/DL (ref 8.5–10.1)
CHLORIDE SERPL-SCNC: 105 MMOL/L (ref 100–108)
CO2 SERPL-SCNC: 30 MMOL/L (ref 21–32)
COLOR UR: YELLOW
CREAT SERPL-MCNC: 0.98 MG/DL (ref 0.6–1.3)
DIFFERENTIAL METHOD BLD: ABNORMAL
EOSINOPHIL # BLD: 0.1 K/UL (ref 0–0.4)
EOSINOPHIL NFR BLD: 4 % (ref 0–5)
ERYTHROCYTE [DISTWIDTH] IN BLOOD BY AUTOMATED COUNT: 13.7 % (ref 11.6–14.5)
GLOBULIN SER CALC-MCNC: 4.1 G/DL (ref 2–4)
GLUCOSE SERPL-MCNC: 85 MG/DL (ref 74–99)
GLUCOSE UR STRIP.AUTO-MCNC: NEGATIVE MG/DL
HCT VFR BLD AUTO: 39.8 % (ref 36–48)
HGB BLD-MCNC: 13.3 G/DL (ref 13–16)
HGB UR QL STRIP: NEGATIVE
KETONES UR QL STRIP.AUTO: NEGATIVE MG/DL
LEUKOCYTE ESTERASE UR QL STRIP.AUTO: NEGATIVE
LIPASE SERPL-CCNC: 247 U/L (ref 73–393)
LYMPHOCYTES # BLD: 1.9 K/UL (ref 0.9–3.6)
LYMPHOCYTES NFR BLD: 49 % (ref 21–52)
MCH RBC QN AUTO: 28.4 PG (ref 24–34)
MCHC RBC AUTO-ENTMCNC: 33.4 G/DL (ref 31–37)
MCV RBC AUTO: 84.9 FL (ref 74–97)
MONOCYTES # BLD: 0.6 K/UL (ref 0.05–1.2)
MONOCYTES NFR BLD: 14 % (ref 3–10)
NEUTS SEG # BLD: 1.2 K/UL (ref 1.8–8)
NEUTS SEG NFR BLD: 32 % (ref 40–73)
NITRITE UR QL STRIP.AUTO: NEGATIVE
PH UR STRIP: 7.5 [PH] (ref 5–8)
PLATELET # BLD AUTO: 259 K/UL (ref 135–420)
PMV BLD AUTO: 11 FL (ref 9.2–11.8)
POTASSIUM SERPL-SCNC: 4.3 MMOL/L (ref 3.5–5.5)
PROT SERPL-MCNC: 7.7 G/DL (ref 6.4–8.2)
PROT UR STRIP-MCNC: NEGATIVE MG/DL
RBC # BLD AUTO: 4.69 M/UL (ref 4.7–5.5)
SODIUM SERPL-SCNC: 139 MMOL/L (ref 136–145)
SP GR UR REFRACTOMETRY: 1.02 (ref 1–1.03)
UROBILINOGEN UR QL STRIP.AUTO: 1 EU/DL (ref 0.2–1)
WBC # BLD AUTO: 3.8 K/UL (ref 4.6–13.2)

## 2017-10-23 PROCEDURE — 99283 EMERGENCY DEPT VISIT LOW MDM: CPT

## 2017-10-23 PROCEDURE — 80053 COMPREHEN METABOLIC PANEL: CPT | Performed by: NURSE PRACTITIONER

## 2017-10-23 PROCEDURE — 83690 ASSAY OF LIPASE: CPT | Performed by: NURSE PRACTITIONER

## 2017-10-23 PROCEDURE — 85025 COMPLETE CBC W/AUTO DIFF WBC: CPT | Performed by: NURSE PRACTITIONER

## 2017-10-23 PROCEDURE — 74011636320 HC RX REV CODE- 636/320: Performed by: EMERGENCY MEDICINE

## 2017-10-23 PROCEDURE — 96361 HYDRATE IV INFUSION ADD-ON: CPT

## 2017-10-23 PROCEDURE — 96375 TX/PRO/DX INJ NEW DRUG ADDON: CPT

## 2017-10-23 PROCEDURE — 74177 CT ABD & PELVIS W/CONTRAST: CPT

## 2017-10-23 PROCEDURE — 96374 THER/PROPH/DIAG INJ IV PUSH: CPT

## 2017-10-23 PROCEDURE — 81003 URINALYSIS AUTO W/O SCOPE: CPT | Performed by: EMERGENCY MEDICINE

## 2017-10-23 PROCEDURE — 74011250636 HC RX REV CODE- 250/636: Performed by: NURSE PRACTITIONER

## 2017-10-23 RX ORDER — SODIUM CHLORIDE 9 MG/ML
1000 INJECTION, SOLUTION INTRAVENOUS
Status: COMPLETED | OUTPATIENT
Start: 2017-10-23 | End: 2017-10-23

## 2017-10-23 RX ORDER — MORPHINE SULFATE 2 MG/ML
4 INJECTION, SOLUTION INTRAMUSCULAR; INTRAVENOUS
Status: COMPLETED | OUTPATIENT
Start: 2017-10-23 | End: 2017-10-23

## 2017-10-23 RX ORDER — ONDANSETRON 2 MG/ML
4 INJECTION INTRAMUSCULAR; INTRAVENOUS
Status: COMPLETED | OUTPATIENT
Start: 2017-10-23 | End: 2017-10-23

## 2017-10-23 RX ADMIN — IOPAMIDOL 100 ML: 612 INJECTION, SOLUTION INTRAVENOUS at 17:23

## 2017-10-23 RX ADMIN — ONDANSETRON 4 MG: 2 INJECTION INTRAMUSCULAR; INTRAVENOUS at 16:46

## 2017-10-23 RX ADMIN — MORPHINE SULFATE 4 MG: 2 INJECTION, SOLUTION INTRAMUSCULAR; INTRAVENOUS at 16:46

## 2017-10-23 RX ADMIN — SODIUM CHLORIDE 1000 ML: 900 INJECTION, SOLUTION INTRAVENOUS at 16:46

## 2017-10-23 NOTE — ED NOTES
Patient given discharge instructions. Patient refused to have vital signs taken. I have reviewed discharge instructions with the patient. The patient verbalized understanding.  Patient armband removed and shredded

## 2017-10-23 NOTE — ED PROVIDER NOTES
[address]  EMERGENCY DEPARTMENT HISTORY AND PHYSICAL EXAM       Date: 10/23/2017   Patient Name: Nelson Abdalla   YOB: 1990  Medical Record Number: [id number]    History of Presenting Illness       Chief Complaint   Patient presents with    Abdominal Pain        History Provided By:  patient    Additional History: 3:39 PM  Nelson Abdalla is a 28 y.o. male who presents to the emergency department C/O gradually worsening periumbilical abdominal pain onset 1 week ago s/p picking up heavy bag of potatos. Associated symptoms include N/V, subjective fever (97.6 F in ED), congestion, rhinorrhea and testicular pain. Has taken Tylenol and Tramadol for pain without relief. Reports recent appy by Dr. Ambriz Memos ~1 month ago. Pt denies chills, change in BM, and any other Sx or complaints. Primary Care Provider: None   Specialist: Jorge Garcia MD (General Surgery)    Past History     Past Medical History:   Past Medical History:   Diagnosis Date    Asthma         Past Surgical History:   Past Surgical History:   Procedure Laterality Date    HX APPENDECTOMY          Family History:   No family history on file. Social History:   Social History   Substance Use Topics    Smoking status: Never Smoker    Smokeless tobacco: Never Used    Alcohol use Yes      Comment: socially         Allergies:   No Known Allergies     Review of Systems   Review of Systems   Constitutional: Positive for fever. Negative for chills. HENT: Positive for congestion and rhinorrhea. Gastrointestinal: Positive for abdominal pain, nausea and vomiting. Negative for constipation and diarrhea. Genitourinary: Positive for testicular pain. All other systems reviewed and are negative.       Physical Exam  Vitals:    10/23/17 1522   BP: 128/77   Pulse: 64   Resp: 16   Temp: 97.6 °F (36.4 °C)   SpO2: 100%   Weight: 113.4 kg (250 lb)   Height: 5' 11\" (1.803 m)       Physical Exam   Constitutional: He is oriented to person, place, and time. He appears well-developed and well-nourished. HENT:   Head: Normocephalic and atraumatic. Eyes: Conjunctivae are normal.   Neck: Normal range of motion. Cardiovascular: Normal rate and regular rhythm. Pulmonary/Chest: Effort normal and breath sounds normal.   Abdominal: Soft. Bowel sounds are normal. There is tenderness. Hernia confirmed negative in the right inguinal area and confirmed negative in the left inguinal area. Genitourinary:       Cremasteric reflex is present. Musculoskeletal: Normal range of motion. Neurological: He is alert and oriented to person, place, and time. Skin: Skin is warm and dry. Nursing note and vitals reviewed. Impression: Will check basic abdominal labs to evaluate for any signs of infection or acute abnormality. Will obtain Ct to evaluate for any acute abnormrlaty, surgical complication or hernia. Patient given IV NSS, Morphine and Zofran     Diagnostic Study Results     Labs -      Recent Results (from the past 12 hour(s))   CBC WITH AUTOMATED DIFF    Collection Time: 10/23/17  3:09 PM   Result Value Ref Range    WBC 3.8 (L) 4.6 - 13.2 K/uL    RBC 4.69 (L) 4.70 - 5.50 M/uL    HGB 13.3 13.0 - 16.0 g/dL    HCT 39.8 36.0 - 48.0 %    MCV 84.9 74.0 - 97.0 FL    MCH 28.4 24.0 - 34.0 PG    MCHC 33.4 31.0 - 37.0 g/dL    RDW 13.7 11.6 - 14.5 %    PLATELET 569 745 - 217 K/uL    MPV 11.0 9.2 - 11.8 FL    NEUTROPHILS 32 (L) 40 - 73 %    LYMPHOCYTES 49 21 - 52 %    MONOCYTES 14 (H) 3 - 10 %    EOSINOPHILS 4 0 - 5 %    BASOPHILS 1 0 - 2 %    ABS. NEUTROPHILS 1.2 (L) 1.8 - 8.0 K/UL    ABS. LYMPHOCYTES 1.9 0.9 - 3.6 K/UL    ABS. MONOCYTES 0.6 0.05 - 1.2 K/UL    ABS. EOSINOPHILS 0.1 0.0 - 0.4 K/UL    ABS.  BASOPHILS 0.0 0.0 - 0.06 K/UL    DF AUTOMATED     METABOLIC PANEL, COMPREHENSIVE    Collection Time: 10/23/17  3:09 PM   Result Value Ref Range    Sodium 139 136 - 145 mmol/L    Potassium 4.3 3.5 - 5.5 mmol/L    Chloride 105 100 - 108 mmol/L    CO2 30 21 - 32 mmol/L    Anion gap 4 3.0 - 18 mmol/L    Glucose 85 74 - 99 mg/dL    BUN 10 7.0 - 18 MG/DL    Creatinine 0.98 0.6 - 1.3 MG/DL    BUN/Creatinine ratio 10 (L) 12 - 20      GFR est AA >60 >60 ml/min/1.73m2    GFR est non-AA >60 >60 ml/min/1.73m2    Calcium 9.0 8.5 - 10.1 MG/DL    Bilirubin, total 0.4 0.2 - 1.0 MG/DL    ALT (SGPT) 27 16 - 61 U/L    AST (SGOT) 24 15 - 37 U/L    Alk. phosphatase 66 45 - 117 U/L    Protein, total 7.7 6.4 - 8.2 g/dL    Albumin 3.6 3.4 - 5.0 g/dL    Globulin 4.1 (H) 2.0 - 4.0 g/dL    A-G Ratio 0.9 0.8 - 1.7     LIPASE    Collection Time: 10/23/17  3:09 PM   Result Value Ref Range    Lipase 247 73 - 393 U/L   URINALYSIS W/ RFLX MICROSCOPIC    Collection Time: 10/23/17  3:20 PM   Result Value Ref Range    Color YELLOW      Appearance CLEAR      Specific gravity 1.019 1.005 - 1.030      pH (UA) 7.5 5.0 - 8.0      Protein NEGATIVE  NEG mg/dL    Glucose NEGATIVE  NEG mg/dL    Ketone NEGATIVE  NEG mg/dL    Bilirubin NEGATIVE  NEG      Blood NEGATIVE  NEG      Urobilinogen 1.0 0.2 - 1.0 EU/dL    Nitrites NEGATIVE  NEG      Leukocyte Esterase NEGATIVE  NEG         Radiologic Studies -  The following have been ordered and reviewed:   CT ABD PELV W CONT   Final Result   IMPRESSION:     Postoperative changes of appendectomy but no evidence of bowel obstruction or acute inflammatory process in the abdomen or pelvis. As read by the radiologist.          Medical Decision Making   I am the first provider for this patient. I reviewed the vital signs, available nursing notes, past medical history, past surgical history, family history and social history. Vital Signs-Reviewed the patient's vital signs. Patient Vitals for the past 12 hrs:   Temp Pulse Resp BP SpO2   10/23/17 1522 97.6 °F (36.4 °C) 64 16 128/77 100 %       Pulse Oximetry Analysis - Normal 100% on room air     ED Course:  3:39 PM  Initial assessment performed.  The patients presenting problems have been discussed, and they are in agreement with the care plan formulated and outlined with them. I have encouraged them to ask questions as they arise throughout their visit. 4:05 PM Consult: Discussed patient's history, exam, and available diagnostics results with Luly Zhu MD, ED Attending, who recommends to order CT A/P with contrast.     6:25 PM Consult: Spoke with ED Attending, Luly Zhu MD, regarding results of CT. Consulting physician is in agreement with plan for discharge and general surgery for further outpatient evaluation. 6:27 PM resting comfortably on stretcher. Abdomen nontender after medications give. Updated on results and reasons to RTED. Understand need to f/u with Dave Morris MD. Offering no questions or concerns at this time. Medications Given in the ED:  Medications   morphine injection 4 mg (4 mg IntraVENous Given 10/23/17 1646)   ondansetron (ZOFRAN) injection 4 mg (4 mg IntraVENous Given 10/23/17 1646)   0.9% sodium chloride infusion 1,000 mL (1,000 mL Ophthalmic New Bag 10/23/17 1646)   iopamidol (ISOVUE 300) 61 % contrast injection 100 mL (100 mL IntraVENous Given 10/23/17 1723)       Discharge Note:  6:28 PM  Patients results have been reviewed with them. Patient and/or family have verbally conveyed their understanding and agreement of the patient's signs, symptoms, diagnosis, treatment and prognosis and additionally agree to follow up as recommended or return to the Emergency Room should their condition change prior to their follow-up appointment. Patient verbally agrees with the care-plan and verbally conveys that all of their questions have been answered. Discharge instructions have also been provided to the patient with some educational information regarding their diagnosis as well a list of reasons why they would want to return to the ER prior to their follow-up appointment should their condition change. Diagnosis   Clinical Impression:   1.  Abdominal pain, generalized           Follow-up Information     Follow up With Details Comments Jorge Luis Day MD Go in 3 days For follow up with general surgery, As needed, if symptoms worsen [address] [facility]. [facility] [address] [facility]      THE [facility] EMERGENCY DEPT Go to As needed, if symptoms worsen [address] [place] [id number]  [phone]          There are no discharge medications for this patient.      _______________________________   Attestations: This note is prepared by Carlos Eduardo Saez, acting as a Scribe for [facility] FNP-BC . [facility] FNP-BC: The scribe's documentation has been prepared under my direction and personally reviewed by me in its entirety.  I confirm that the note above accurately reflects all work, treatment, procedures, and medical decision making performed by me.    _______________________________

## 2017-10-23 NOTE — LETTER
[facility] 
THE [facility] EMERGENCY DEPT 
[address] [address] 
[phone] Work/School Note Date: 10/23/2017 To Whom It May concern: 
 
Peg Copper was seen and treated today in the emergency room by the following provider(s): 
Attending Provider: Missy Sevilla MD 
Nurse Practitioner: Venu Verdin NP. Peg Copper may return to work tomorrow, 10/24/17. Sincerely, Flip ESCOBAR-BC

## 2017-10-23 NOTE — ED TRIAGE NOTES
C/o abd pain for a couple of days with vomiting, s/p appy here on 9/28/17. States he has been feeling \"hot\" sleeping for 2 days, nasal congestion. Sepsis Screening completed    (  )Patient meets SIRS criteria. ( x )Patient does not meet SIRS criteria.       SIRS Criteria is achieved when two or more of the following are present   Temperature < 96.8°F (36°C) or > 100.9°F (38.3°C)   Heart Rate > 90 beats per minute   Respiratory Rate > 20 breaths per minute   WBC count > 12,000 or <4,000 or > 10% bands

## 2017-10-23 NOTE — DISCHARGE INSTRUCTIONS

## 2017-11-14 ENCOUNTER — HOSPITAL ENCOUNTER (EMERGENCY)
Age: 27
Discharge: HOME | End: 2017-11-14
Attending: EMERGENCY MEDICINE | Admitting: EMERGENCY MEDICINE
Payer: SELF-PAY

## 2017-11-14 VITALS
SYSTOLIC BLOOD PRESSURE: 118 MMHG | OXYGEN SATURATION: 100 % | HEIGHT: 71 IN | HEART RATE: 66 BPM | RESPIRATION RATE: 20 BRPM | DIASTOLIC BLOOD PRESSURE: 80 MMHG | TEMPERATURE: 97.9 F | BODY MASS INDEX: 35 KG/M2 | WEIGHT: 250 LBS

## 2017-11-14 DIAGNOSIS — L03.112 CELLULITIS OF LEFT AXILLA: Primary | ICD-10-CM

## 2017-11-14 PROCEDURE — 74011250637 HC RX REV CODE- 250/637: Performed by: PHYSICIAN ASSISTANT

## 2017-11-14 PROCEDURE — 99283 EMERGENCY DEPT VISIT LOW MDM: CPT

## 2017-11-14 RX ORDER — SULFAMETHOXAZOLE AND TRIMETHOPRIM 800; 160 MG/1; MG/1
2 TABLET ORAL
Status: COMPLETED | OUTPATIENT
Start: 2017-11-14 | End: 2017-11-14

## 2017-11-14 RX ORDER — CEPHALEXIN 250 MG/1
500 CAPSULE ORAL
Status: COMPLETED | OUTPATIENT
Start: 2017-11-14 | End: 2017-11-14

## 2017-11-14 RX ORDER — ONDANSETRON 4 MG/1
4 TABLET, ORALLY DISINTEGRATING ORAL
Status: COMPLETED | OUTPATIENT
Start: 2017-11-14 | End: 2017-11-14

## 2017-11-14 RX ORDER — CEPHALEXIN 500 MG/1
500 CAPSULE ORAL 4 TIMES DAILY
Qty: 40 CAP | Refills: 0 | Status: SHIPPED | OUTPATIENT
Start: 2017-11-14 | End: 2017-11-24

## 2017-11-14 RX ORDER — SULFAMETHOXAZOLE AND TRIMETHOPRIM 800; 160 MG/1; MG/1
2 TABLET ORAL 2 TIMES DAILY
Qty: 40 TAB | Refills: 0 | Status: SHIPPED | OUTPATIENT
Start: 2017-11-14 | End: 2017-11-24

## 2017-11-14 RX ADMIN — ONDANSETRON 4 MG: 4 TABLET, ORALLY DISINTEGRATING ORAL at 09:34

## 2017-11-14 RX ADMIN — CEPHALEXIN 500 MG: 250 CAPSULE ORAL at 09:34

## 2017-11-14 RX ADMIN — SULFAMETHOXAZOLE AND TRIMETHOPRIM 2 TABLET: 800; 160 TABLET ORAL at 09:34

## 2017-11-14 NOTE — LETTER
[facility] [place] 
[facility] 
[address] [address] 
[phone] Work/School Note Date: 11/14/2017 To Whom It May concern: 
 
Lorena Bishop was seen and treated today in the emergency room by the following provider(s): 
Physician Assistant: Wendi Kennedy PA-C. Lorena Bishop may return to work on 11/16/17. Sincerely, Anselmo Munoz PA-C

## 2017-11-14 NOTE — ED PROVIDER NOTES
HPI Comments:   8:16 AM   Conner Alfaro is a 28 y.o. male with hx of asthma, who presents to ED c/o gradually worsening abscess to left axilla onset 2 days ago. Associated symptoms include redness and 10/10 pain to left axilla. Also notes small swelling to right axilla, but states its not painful. States he has used new deodorant 2 days ago from [employer] prior to onset of abscess. Has not taken anythign for sxs. Pt denies hx DM, fever, chills, drainage, and any other Sx or complaints. Patient is a 28 y.o. male presenting with abscess. The history is provided by the patient. No  was used. Abscess    This is a new problem. The current episode started 2 days ago. The problem has not changed since onset. The problem is associated with new detergent or soap. There has been no fever. Affected Location: left axilla. The pain is at a severity of 10/10. Associated symptoms include pain. Pertinent negatives include no weeping. He has tried nothing for the symptoms. Past Medical History:   Diagnosis Date    Asthma        Past Surgical History:   Procedure Laterality Date    HX APPENDECTOMY           History reviewed. No pertinent family history. Social History     Social History    Marital status: SINGLE     Spouse name: N/A    Number of children: N/A    Years of education: N/A     Occupational History    Not on file. Social History Main Topics    Smoking status: Never Smoker    Smokeless tobacco: Never Used    Alcohol use Yes      Comment: socially     Drug use: No    Sexual activity: Not on file     Other Topics Concern    Not on file     Social History Narrative         ALLERGIES: Review of patient's allergies indicates no known allergies. Review of Systems   Constitutional: Negative for chills and fever. Gastrointestinal: Negative for nausea and vomiting. Musculoskeletal: Positive for myalgias (left axilla).    Skin: Positive for color change (redness to left axilla). Negative for wound. +swelling abscess to left axilla   Allergic/Immunologic: Negative for immunocompromised state. Hematological: Negative for adenopathy. Vitals:    11/14/17 0905   BP: 118/80   Pulse: 66   Resp: 20   Temp: 97.9 °F (36.6 °C)   SpO2: 100%   Weight: 113.4 kg (250 lb)   Height: 5' 11\" (1.803 m)            Physical Exam   Constitutional: He is oriented to person, place, and time. He appears well-developed and well-nourished. No distress. AA male in NAD. Alert. Appears uncomfortable. HENT:   Head: Normocephalic and atraumatic. Right Ear: External ear normal.   Left Ear: External ear normal.   Nose: Nose normal.   Eyes: Conjunctivae are normal.   Neck: Normal range of motion. Cardiovascular: Normal rate, regular rhythm, normal heart sounds and intact distal pulses. Exam reveals no gallop and no friction rub. No murmur heard. Pulmonary/Chest: Effort normal and breath sounds normal. No accessory muscle usage. No tachypnea. No respiratory distress. He has no decreased breath sounds. He has no wheezes. He has no rhonchi. He has no rales. Abdominal: Bowel sounds are normal.   Musculoskeletal: Normal range of motion. Neurological: He is alert and oriented to person, place, and time. Skin: Skin is warm and dry. He is not diaphoretic. Psychiatric: He has a normal mood and affect. Judgment normal.   Nursing note and vitals reviewed. RESULTS:    PULSE OXIMETRY NOTE:  Pulse-ox is 100% on RA  Interpretation: normal       No orders to display        Labs Reviewed - No data to display    No results found for this or any previous visit (from the past 12 hour(s)). MDM  Number of Diagnoses or Management Options  Cellulitis of left axilla:   Diagnosis management comments: Cellulitis v abscess. Not appropriate for I&D. Not DMII. Not immunocompromised. Will Rx ABX and instruct warm compress. No evidence of deep or systemic process. Reasons to RTED discussed with pt. All questions answered. Pt feels comfortable going home at this time. Pt expressed understanding and he agrees with plan. ED Course     MEDICATIONS GIVEN:  Medications   trimethoprim-sulfamethoxazole (BACTRIM DS, SEPTRA DS) 160-800 mg per tablet 2 Tab (2 Tabs Oral Given 11/14/17 0934)   cephALEXin (KEFLEX) capsule 500 mg (500 mg Oral Given 11/14/17 0934)   ondansetron (ZOFRAN ODT) tablet 4 mg (4 mg Oral Given 11/14/17 0934)        PROCEDURES:   Procedures    PROGRESS NOTE:  9:15 AM  Initial assessment performed. Written by Yasmine Ricketts, ED Scribe, as dictated by Roderick Frost PA-C    See MDM above. DISCHARGE NOTE:  9:23 AM  Rosas Mullins's  results have been reviewed with him. He has been counseled regarding his diagnosis, treatment, and plan. He verbally conveys understanding and agreement of the signs, symptoms, diagnosis, treatment and prognosis and additionally agrees to follow up as discussed. He also agrees with the care-plan and conveys that all of his questions have been answered. I have also provided discharge instructions for him that include: educational information regarding their diagnosis and treatment, and list of reasons why they would want to return to the ED prior to their follow-up appointment, should his condition change. Proper ED utilization discussed with the pt. CLINICAL IMPRESSION:    1.  Cellulitis of left axilla        PLAN: DISCHARGE HOME    Follow-up Information     Follow up With Details Comments Contact Info    [facility] Schedule an appointment as soon as possible for a visit in 2 days For follow up with [facility] [id number] [facility], [address] [place] [address] [address]    [facility] EMERGENCY DEPT Go to As needed, if symptoms worsen [address] [place] [id number]  [phone]          Discharge Medication List as of 11/14/2017  9:22 AM      START taking these medications    Details   trimethoprim-sulfamethoxazole (BACTRIM DS) 160-800 mg per tablet Take 2 Tabs by mouth two (2) times a day for 10 days. First dose in ER. Next dose in 12 hours. , Normal, Disp-40 Tab, R-0      cephALEXin (KEFLEX) 500 mg capsule Take 1 Cap by mouth four (4) times daily for 10 days. First dose in ER. Next dose in 6 hours. , Normal, Disp-40 Cap, R-0             ATTESTATIONS:  This note is prepared by Shantelle Whitmore, acting as Scribe for Karolina Witt PA-C. Karolina Witt PA-C: The scribe's documentation has been prepared under my direction and personally reviewed by me in its entirety. I confirm that the note above accurately reflects all work, treatment, procedures, and medical decision making performed by me.

## 2017-11-14 NOTE — DISCHARGE INSTRUCTIONS
Cellulitis: Care Instructions  Your Care Instructions    Cellulitis is a skin infection. It often occurs after a break in the skin from a scrape, cut, bite, or puncture, or after a rash. The doctor has checked you carefully, but problems can develop later. If you notice any problems or new symptoms, get medical treatment right away. Follow-up care is a key part of your treatment and safety. Be sure to make and go to all appointments, and call your doctor if you are having problems. It's also a good idea to know your test results and keep a list of the medicines you take. How can you care for yourself at home? · Take your antibiotics as directed. Do not stop taking them just because you feel better. You need to take the full course of antibiotics. · Prop up the infected area on pillows to reduce pain and swelling. Try to keep the area above the level of your heart as often as you can. · If your doctor told you how to care for your wound, follow your doctor's instructions. If you did not get instructions, follow this general advice:  ¨ Wash the wound with clean water 2 times a day. Don't use hydrogen peroxide or alcohol, which can slow healing. ¨ You may cover the wound with a thin layer of petroleum jelly, such as Vaseline, and a nonstick bandage. ¨ Apply more petroleum jelly and replace the bandage as needed. · Be safe with medicines. Take pain medicines exactly as directed. ¨ If the doctor gave you a prescription medicine for pain, take it as prescribed. ¨ If you are not taking a prescription pain medicine, ask your doctor if you can take an over-the-counter medicine. To prevent cellulitis in the future  · Try to prevent cuts, scrapes, or other injuries to your skin. Cellulitis most often occurs where there is a break in the skin. · If you get a scrape, cut, mild burn, or bite, wash the wound with clean water as soon as you can to help avoid infection.  Don't use hydrogen peroxide or alcohol, which can slow healing. · If you have swelling in your legs (edema), support stockings and good skin care may help prevent leg sores and cellulitis. · Take care of your feet, especially if you have diabetes or other conditions that increase the risk of infection. Wear shoes and socks. Do not go barefoot. If you have athlete's foot or other skin problems on your feet, talk to your doctor about how to treat them. When should you call for help? Call your doctor now or seek immediate medical care if:  ? · You have signs that your infection is getting worse, such as:  ¨ Increased pain, swelling, warmth, or redness. ¨ Red streaks leading from the area. ¨ Pus draining from the area. ¨ A fever. ? · You get a rash. ? Watch closely for changes in your health, and be sure to contact your doctor if:  ? · You are not getting better after 1 day (24 hours). ? · You do not get better as expected. Where can you learn more? Go to http://glenn-mayra.info/. Felicitas Mcfarlane in the search box to learn more about \"Cellulitis: Care Instructions. \"  Current as of: October 13, 2016  Content Version: 11.4  © 0958-7397 [employer]. Care instructions adapted under license by [employer] (which disclaims liability or warranty for this information). If you have questions about a medical condition or this instruction, always ask your healthcare professional. [place] [address] any warranty or liability for your use of this information.

## 2017-11-16 ENCOUNTER — HOSPITAL ENCOUNTER (EMERGENCY)
Age: 27
Discharge: HOME | End: 2017-11-16
Attending: EMERGENCY MEDICINE | Admitting: EMERGENCY MEDICINE
Payer: SELF-PAY

## 2017-11-16 VITALS
OXYGEN SATURATION: 99 % | DIASTOLIC BLOOD PRESSURE: 91 MMHG | TEMPERATURE: 98.1 F | SYSTOLIC BLOOD PRESSURE: 153 MMHG | HEART RATE: 68 BPM | RESPIRATION RATE: 16 BRPM

## 2017-11-16 DIAGNOSIS — L02.412 ABSCESS OF LEFT AXILLA: Primary | ICD-10-CM

## 2017-11-16 PROCEDURE — 77030019895 HC PCKNG STRP IODO -A

## 2017-11-16 PROCEDURE — 99282 EMERGENCY DEPT VISIT SF MDM: CPT

## 2017-11-16 PROCEDURE — 75810000289 HC I&D ABSCESS SIMP/COMP/MULT

## 2017-11-16 RX ORDER — HYDROCODONE BITARTRATE AND ACETAMINOPHEN 5; 325 MG/1; MG/1
1-2 TABLET ORAL
Qty: 12 TAB | Refills: 0 | Status: SHIPPED | OUTPATIENT
Start: 2017-11-16 | End: 2018-03-10

## 2017-11-16 NOTE — ED PROVIDER NOTES
HPI Comments: 12:50 PM   Mar Duran is a 28 y.o. male presenting to the ED C/O constant, gradually worsening previously DDx'd skin abscess located to the left axilla  onset 5 days ago. The Pt was seen here by Nadira Art PA-C 2 days ago and given trimethoprim-sulfamethoxazole (BACTRIM DS, SEPTRA DS) 160-800 mg per tablet 2 Tab. The patient has been compliant with the medication, and has returned to the ED C/O more pain and swelling. No drainage from the site. Pt denies fever, chills, and any other symptoms or complaints. Written by Say Calloway ED Scribyola, as dictated by Nadira Art PA-C      The history is provided by the patient. No  was used. Past Medical History:   Diagnosis Date    Asthma        Past Surgical History:   Procedure Laterality Date    HX APPENDECTOMY           History reviewed. No pertinent family history. Social History     Social History    Marital status: SINGLE     Spouse name: N/A    Number of children: N/A    Years of education: N/A     Occupational History    Not on file. Social History Main Topics    Smoking status: Never Smoker    Smokeless tobacco: Never Used    Alcohol use Yes      Comment: socially     Drug use: No    Sexual activity: Not on file     Other Topics Concern    Not on file     Social History Narrative         ALLERGIES: Review of patient's allergies indicates no known allergies. Review of Systems   Constitutional: Negative for chills and fever. Gastrointestinal: Negative for nausea and vomiting. Skin: Positive for wound (akin abscess). Negative for color change. Allergic/Immunologic: Negative for immunocompromised state. Hematological: Negative for adenopathy. Vitals:    11/16/17 1253   BP: (!) 153/91   Pulse: 68   Resp: 16   Temp: 98.1 °F (36.7 °C)   SpO2: 99%            Physical Exam   Constitutional: He is oriented to person, place, and time. He appears well-developed and well-nourished.  No distress. AA male in NAD. Alert. Appears comfortable. In fast track room   HENT:   Head: Normocephalic and atraumatic. Right Ear: External ear normal.   Left Ear: External ear normal.   Nose: Nose normal.   Eyes: Conjunctivae are normal.   Neck: Normal range of motion. Cardiovascular: Normal rate, regular rhythm, normal heart sounds and intact distal pulses. Exam reveals no gallop and no friction rub. No murmur heard. Pulmonary/Chest: Effort normal and breath sounds normal. No accessory muscle usage. No tachypnea. He has no decreased breath sounds. He has no rhonchi. Musculoskeletal: Normal range of motion. Arms:  Neurological: He is alert and oriented to person, place, and time. Skin: Skin is warm and dry. He is not diaphoretic. Psychiatric: He has a normal mood and affect. Judgment normal.   Nursing note and vitals reviewed. RESULTS:    CARDIAC MONITOR NOTE:  Cardiac Rhythm: NSR  Rate: 68 bpm     PULSE OXIMETRY NOTE:  Pulse-ox is 99% on room air  Interpretation: normal       No orders to display        Labs Reviewed - No data to display    No results found for this or any previous visit (from the past 12 hour(s)). MDM  Number of Diagnoses or Management Options  Abscess of left axilla:   Diagnosis management comments: Abscess to left axilla. On bactrim. Worsening. Will need I&D. ED Course     Medications - No data to display   I&D Abcess Simple  Date/Time: 11/16/2017 12:56 PM  Performed by: Claudene Montane  Authorized by: Nelson Chavez     Consent:     Consent obtained:  Verbal    Consent given by:  Patient    Risks discussed:  Bleeding and pain    Alternatives discussed:  No treatment  Location:     Type:  Abscess    Location: left axilla. Pre-procedure details:     Skin preparation:  Betadine  Anesthesia (see MAR for exact dosages):      Anesthesia method:  Local infiltration    Local anesthetic:  Lidocaine 1% w/o epi (5 cc)  Procedure type:     Complexity: Simple  Procedure details:     Scalpel blade:  11    Wound management:  Probed and deloculated and irrigated with saline    Drainage:  Purulent    Drainage amount: Moderate    Wound treatment:  Drain placed    Packing materials:  1/4 in iodoform gauze  Post-procedure details:     Patient tolerance of procedure: Tolerated well, no immediate complications        PROGRESS NOTE:  12:50  Initial assessment performed. Written by Zuleika Bower ED Scribe, as dictated by Wilber Posey PA-C     Successful I&D of left axilla abscess. Packing removal in 48 hours. Finish ABX (bactrim). No evidence of deep or systemic infectious process. Reasons to RTED discussed with pt. All questions answered. Pt feels comfortable going home at this time. Pt expressed understanding and he agrees with plan. DISCHARGE NOTE:  1:18 PM   Brenda Arnett  results have been reviewed with him. He has been counseled regarding his diagnosis, treatment, and plan. He verbally conveys understanding and agreement of the signs, symptoms, diagnosis, treatment and prognosis and additionally agrees to follow up as discussed. He also agrees with the care-plan and conveys that all of his questions have been answered. I have also provided discharge instructions for him that include: educational information regarding their diagnosis and treatment, and list of reasons why they would want to return to the ED prior to their follow-up appointment, should his condition change. The patient and/or family has been provided with education for proper Emergency Department utilization. CLINICAL IMPRESSION:    1.  Abscess of left axilla        PLAN: DISCHARGE HOME    Follow-up Information     Follow up With Details Comments Contact Info    [facility] CLINIC Schedule an appointment as soon as possible for a visit in 2 days Follow-up at the [facility] [address] [facility], [address]    [facility] EMERGENCY DEPT Go to As needed, If symptoms worsen [address] [place] [address]  [phone]          Discharge Medication List as of 11/16/2017  1:26 PM      START taking these medications    Details   HYDROcodone-acetaminophen (NORCO) 5-325 mg per tablet Take 1-2 Tabs by mouth every four (4) hours as needed for Pain. Max Daily Amount: 12 Tabs., Print, Disp-12 Tab, R-0         CONTINUE these medications which have NOT CHANGED    Details   trimethoprim-sulfamethoxazole (BACTRIM DS) 160-800 mg per tablet Take 2 Tabs by mouth two (2) times a day for 10 days. First dose in ER. Next dose in 12 hours. , Normal, Disp-40 Tab, R-0      cephALEXin (KEFLEX) 500 mg capsule Take 1 Cap by mouth four (4) times daily for 10 days. First dose in ER. Next dose in 6 hours. , Normal, Disp-40 Cap, R-0             ATTESTATIONS:  This note is prepared by Griselda Mc, acting as Scribe for Vashti Van PA-C . Vashti Van PA-C : The scribe's documentation has been prepared under my direction and personally reviewed by me in its entirety. I confirm that the note above accurately reflects all work, treatment, procedures, and medical decision making performed by me.

## 2017-11-16 NOTE — LETTER
[facility] [place] 
[facility] EMERGENCY DEPT 
[address] [address] 
[phone] Work/School Note Date: 11/16/2017 To Whom It May concern: 
 
Bismark Méndez was seen and treated today in the emergency room by the following provider(s): 
Attending Provider: Adarsh Bhatia MD 
Physician Assistant: Prem Barreto PA-C. Bismark Méndez may return to work on Monday, 11/20/2017. Sincerely, Maddy Alexander PA-C

## 2017-11-16 NOTE — DISCHARGE INSTRUCTIONS

## 2018-03-10 ENCOUNTER — HOSPITAL ENCOUNTER (EMERGENCY)
Age: 28
Discharge: LEFT BEFORE DISCHARGE | End: 2018-03-10
Attending: EMERGENCY MEDICINE
Payer: SELF-PAY

## 2018-03-10 VITALS
DIASTOLIC BLOOD PRESSURE: 71 MMHG | BODY MASS INDEX: 35 KG/M2 | RESPIRATION RATE: 16 BRPM | SYSTOLIC BLOOD PRESSURE: 133 MMHG | OXYGEN SATURATION: 100 % | TEMPERATURE: 98.2 F | HEART RATE: 74 BPM | WEIGHT: 250 LBS | HEIGHT: 71 IN

## 2018-03-10 PROCEDURE — 75810000275 HC EMERGENCY DEPT VISIT NO LEVEL OF CARE

## 2018-03-10 NOTE — ED TRIAGE NOTES
Reports that he didn't go to work yesterday because he threw up the night before and then slept really late and was wet when he woke up, today slept late again and was sweaty when he woke up. States that boss called today and he told him he would go to the MD. Pt smells STRONGLY of marijuana at triage. Sepsis Screening completed    (  )Patient meets SIRS criteria. (x  )Patient does not meet SIRS criteria.       SIRS Criteria is achieved when two or more of the following are present   Temperature < 96.8°F (36°C) or > 100.9°F (38.3°C)   Heart Rate > 90 beats per minute   Respiratory Rate > 20 breaths per minute   WBC count > 12,000 or <4,000 or > 10% bands

## 2018-09-18 ENCOUNTER — APPOINTMENT (OUTPATIENT)
Dept: CT IMAGING | Age: 28
End: 2018-09-18
Attending: EMERGENCY MEDICINE
Payer: SELF-PAY

## 2018-09-18 ENCOUNTER — HOSPITAL ENCOUNTER (EMERGENCY)
Age: 28
Discharge: HOME | End: 2018-09-18
Attending: EMERGENCY MEDICINE
Payer: SELF-PAY

## 2018-09-18 VITALS
HEIGHT: 71 IN | WEIGHT: 200 LBS | BODY MASS INDEX: 28 KG/M2 | RESPIRATION RATE: 20 BRPM | TEMPERATURE: 98.1 F | OXYGEN SATURATION: 100 % | DIASTOLIC BLOOD PRESSURE: 85 MMHG | HEART RATE: 100 BPM | SYSTOLIC BLOOD PRESSURE: 143 MMHG

## 2018-09-18 DIAGNOSIS — S09.90XA INJURY OF HEAD, INITIAL ENCOUNTER: ICD-10-CM

## 2018-09-18 DIAGNOSIS — R55 SYNCOPE, UNSPECIFIED SYNCOPE TYPE: Primary | ICD-10-CM

## 2018-09-18 DIAGNOSIS — S00.03XA CONTUSION OF SCALP, INITIAL ENCOUNTER: ICD-10-CM

## 2018-09-18 LAB
ALBUMIN SERPL-MCNC: 3.5 G/DL (ref 3.4–5)
ALBUMIN/GLOB SERPL: 0.8 {RATIO} (ref 0.8–1.7)
ALP SERPL-CCNC: 55 U/L (ref 45–117)
ALT SERPL-CCNC: 28 U/L (ref 16–61)
ANION GAP SERPL CALC-SCNC: 8 MMOL/L (ref 3–18)
AST SERPL-CCNC: 20 U/L (ref 15–37)
BASOPHILS # BLD: 0 K/UL (ref 0–0.1)
BASOPHILS NFR BLD: 1 % (ref 0–2)
BILIRUB SERPL-MCNC: 0.6 MG/DL (ref 0.2–1)
BUN SERPL-MCNC: 11 MG/DL (ref 7–18)
BUN/CREAT SERPL: 9 (ref 12–20)
CALCIUM SERPL-MCNC: 8.8 MG/DL (ref 8.5–10.1)
CHLORIDE SERPL-SCNC: 104 MMOL/L (ref 100–108)
CK MB CFR SERPL CALC: NORMAL % (ref 0–4)
CK MB SERPL-MCNC: <1 NG/ML (ref 5–25)
CK SERPL-CCNC: 250 U/L (ref 39–308)
CO2 SERPL-SCNC: 28 MMOL/L (ref 21–32)
CREAT SERPL-MCNC: 1.24 MG/DL (ref 0.6–1.3)
D DIMER PPP FEU-MCNC: 0.45 UG/ML(FEU)
DIFFERENTIAL METHOD BLD: ABNORMAL
EOSINOPHIL # BLD: 0.1 K/UL (ref 0–0.4)
EOSINOPHIL NFR BLD: 2 % (ref 0–5)
ERYTHROCYTE [DISTWIDTH] IN BLOOD BY AUTOMATED COUNT: 14.4 % (ref 11.6–14.5)
GLOBULIN SER CALC-MCNC: 4.5 G/DL (ref 2–4)
GLUCOSE SERPL-MCNC: 73 MG/DL (ref 74–99)
HCT VFR BLD AUTO: 40.2 % (ref 36–48)
HGB BLD-MCNC: 13.2 G/DL (ref 13–16)
LYMPHOCYTES # BLD: 2.3 K/UL (ref 0.9–3.6)
LYMPHOCYTES NFR BLD: 46 % (ref 21–52)
MCH RBC QN AUTO: 28.5 PG (ref 24–34)
MCHC RBC AUTO-ENTMCNC: 32.8 G/DL (ref 31–37)
MCV RBC AUTO: 86.8 FL (ref 74–97)
MONOCYTES # BLD: 0.5 K/UL (ref 0.05–1.2)
MONOCYTES NFR BLD: 9 % (ref 3–10)
NEUTS SEG # BLD: 2.1 K/UL (ref 1.8–8)
NEUTS SEG NFR BLD: 42 % (ref 40–73)
PLATELET # BLD AUTO: 301 K/UL (ref 135–420)
PMV BLD AUTO: 10.2 FL (ref 9.2–11.8)
POTASSIUM SERPL-SCNC: 4 MMOL/L (ref 3.5–5.5)
PROT SERPL-MCNC: 8 G/DL (ref 6.4–8.2)
RBC # BLD AUTO: 4.63 M/UL (ref 4.7–5.5)
SODIUM SERPL-SCNC: 140 MMOL/L (ref 136–145)
TROPONIN I SERPL-MCNC: <0.02 NG/ML (ref 0–0.06)
WBC # BLD AUTO: 4.9 K/UL (ref 4.6–13.2)

## 2018-09-18 PROCEDURE — 99284 EMERGENCY DEPT VISIT MOD MDM: CPT

## 2018-09-18 PROCEDURE — 70450 CT HEAD/BRAIN W/O DYE: CPT

## 2018-09-18 PROCEDURE — 85379 FIBRIN DEGRADATION QUANT: CPT | Performed by: PHYSICIAN ASSISTANT

## 2018-09-18 PROCEDURE — 93005 ELECTROCARDIOGRAM TRACING: CPT

## 2018-09-18 PROCEDURE — 85025 COMPLETE CBC W/AUTO DIFF WBC: CPT | Performed by: PHYSICIAN ASSISTANT

## 2018-09-18 PROCEDURE — 80053 COMPREHEN METABOLIC PANEL: CPT | Performed by: PHYSICIAN ASSISTANT

## 2018-09-18 PROCEDURE — 82550 ASSAY OF CK (CPK): CPT | Performed by: PHYSICIAN ASSISTANT

## 2018-09-18 RX ORDER — BUTALBITAL, ACETAMINOPHEN AND CAFFEINE 300; 40; 50 MG/1; MG/1; MG/1
1 CAPSULE ORAL
Qty: 10 CAP | Refills: 0 | Status: SHIPPED | OUTPATIENT
Start: 2018-09-18 | End: 2019-04-15

## 2018-09-18 NOTE — ED PROVIDER NOTES
EMERGENCY DEPARTMENT HISTORY AND PHYSICAL EXAM 
 
Date: 9/18/2018 Patient Name: Miguel Espinoza History of Presenting Illness Chief Complaint Patient presents with  
 Head Injury History Provided By: Patient Chief Complaint: head pain Duration: 2 Weeks Timing:  Acute and Constant Location: rear head Quality: Aching Severity: 5 out of 10 Associated Symptoms: Lightheadedness and diaphoresis Additional History (Context):  
5:47 PM 
Miguel Espinoza is a 28 y.o. male with PMHX of Asthma who presents ambulatory to the emergency department C/O constant 5/10 rear head pain s/p a fall from a syncopal episode onset 2 weeks ago. Associated symptoms include lightheadedness and diaphoresis. Reports he was in the bathroom in [place] 2 weeks ago when he suddenly felt weak, dizzy, and subsequently passed out. Reports he woke up on the concrete floor 5 minutes later with rear head pain, knee pain, and sweating (knee pain and sweating are resolved). Pt sought treatment in [place] but did not end up seeing a provider (only blood sugar and EKG were tested). Also notes that he sought medical attention for a similar episode 3 months ago in the [address] [place] [address], but did not receive treatment due to the long wait time. Also notes that he occasionally has palpitations but he denies any heart problems before or after his episodes. Pt endorses EtOH use but denies drinking heavily or significant intoxication before the episodes occurred. Pt denies any similar symptoms when he stands up too quickly. Pt denies CP, SOB, HA, leg swelling, nausea, vomiting, back pain, PMHx arrhythmia, orthostatic hypotension, and any other sxs or complaints. PCP: None Past History Past Medical History: 
Past Medical History:  
Diagnosis Date  Asthma Past Surgical History: 
Past Surgical History:  
Procedure Laterality Date  HX APPENDECTOMY Family History: 
History reviewed. No pertinent family history. Social History: 
Social History Substance Use Topics  Smoking status: Never Smoker  Smokeless tobacco: Never Used  Alcohol use Yes Comment: socially Allergies: 
No Known Allergies Review of Systems Review of Systems Constitutional: Positive for diaphoresis (during syncopal episode). Respiratory: Negative for shortness of breath. Cardiovascular: Negative for chest pain and leg swelling. Gastrointestinal: Negative for nausea and vomiting. Musculoskeletal: Negative for back pain. Neurological: Positive for light-headedness (During syncopal episode) and headaches (+) rear head pain s/p fall. (-) subsequent headaches. All other systems reviewed and are negative. Physical Exam  
 
Vitals:  
 09/18/18 1552 BP: 143/85 Pulse: 100 Resp: 20 Temp: 98.1 °F (36.7 °C) SpO2: 100% Weight: 90.7 kg (200 lb) Height: 5' 11\" (1.803 m) Physical Exam  
Constitutional: He is oriented to person, place, and time. He appears well-developed and well-nourished. No distress. Alert, sitting up in fast track room, NAD HENT:  
Head: Normocephalic and atraumatic. Head is without raccoon's eyes, without Conti's sign and without abrasion. Right Ear: Tympanic membrane, external ear and ear canal normal. No mastoid tenderness. Tympanic membrane is not perforated, not erythematous, not retracted and not bulging. No hemotympanum. Left Ear: Tympanic membrane, external ear and ear canal normal. No mastoid tenderness. Tympanic membrane is not perforated, not erythematous, not retracted and not bulging. No hemotympanum. Nose: Nose normal.  
Mouth/Throat: Uvula is midline, oropharynx is clear and moist and mucous membranes are normal. No trismus in the jaw. No uvula swelling. No oropharyngeal exudate, posterior oropharyngeal edema, posterior oropharyngeal erythema or tonsillar abscesses. Eyes: EOM are normal. Pupils are equal, round, and reactive to light. Neck: Normal range of motion. Neck supple. No spinous process tenderness and no muscular tenderness present. No rigidity. Normal range of motion present. No Brudzinski's sign and no Kernig's sign noted. No meningismus No lymphadenopathy  
c spine non tender Cardiovascular: Normal rate, regular rhythm, normal heart sounds and intact distal pulses. No murmur heard. Pulmonary/Chest: Effort normal and breath sounds normal. No respiratory distress. He has no wheezes. He has no rales. Abdominal: Soft. Bowel sounds are normal. He exhibits no distension. There is no tenderness. There is no rebound and no guarding. Musculoskeletal: Normal range of motion. No leg swelling, edema or tenderness Neurological: He is alert and oriented to person, place, and time. He has normal strength. He displays no atrophy and no tremor. No cranial nerve deficit or sensory deficit. He exhibits normal muscle tone. Coordination and gait normal. GCS eye subscore is 4. GCS verbal subscore is 5. GCS motor subscore is 6. No neuro deficit No pronator drift Normal finger nose finger N/V intact distally Strength 5/5 and equal in all extremities No slurred speech No facial droop Skin: Skin is warm and dry. Psychiatric: He has a normal mood and affect. Judgment normal.  
Nursing note and vitals reviewed. Diagnostic Study Results Labs - No results found for this or any previous visit (from the past 12 hour(s)). Radiologic Studies -  
CT HEAD WO CONT Final Result IMPRESSION: 
 
No acute intracranial abnormalities. As read by the radiologist.  
 
CT Results  (Last 48 hours) 09/18/18 1811  CT HEAD WO CONT Final result Impression:  IMPRESSION:  
   
No acute intracranial abnormalities. Narrative:  EXAM: CT head INDICATION: Fall head injury loss of consciousness COMPARISON: None. TECHNIQUE: Axial CT imaging of the head was performed without intravenous contrast.  
   
DOSE REDUCTION:  One or more dose reduction techniques were used on this CT:  
automated exposure control, adjustment of the mAs and/or kVp according to  
patient's size, and iterative reconstruction techniques. The specific techniques  
utilized on this CT exam have been documented in the patient's electronic  
medical record.  
   
_______________ FINDINGS:  
   
BRAIN AND POSTERIOR FOSSA: The sulci, folia, ventricles and basal cisterns are  
within normal limits for the patient?s age. There is no intracranial hemorrhage,  
mass effect, or midline shift. There are no areas of abnormal parenchymal  
attenuation. EXTRA-AXIAL SPACES AND MENINGES: There are no abnormal extra-axial fluid  
collections. CALVARIUM: Intact. SINUSES: Clear. OTHER: None.  
   
_______________ CXR Results  (Last 48 hours) None Medications given in the ED- Medications - No data to display Medical Decision Making I am the first provider for this patient. I reviewed the vital signs, available nursing notes, past medical history, past surgical history, family history and social history. Vital Signs-Reviewed the patient's vital signs. Pulse Oximetry Analysis - 100% on RA  
 
EKG interpretation: (Preliminary) NSR with sinus arrhythmia at 70 bpm. Incomplete RBBB, no ST elevation EKG read by Diana Robles PA-C at 19:06 Records Reviewed: Nursing Notes Provider Notes (Medical Decision Making): Pt presents c/o head injury after syncopal episode 2 weeks ago. Had similar episode that was witnessed 3 months ago, no head injury at that time. He also reports hx of palpitations but denies palpitations prior to syncopal events. Denies CP or SOB. Will check labs (including D dimer and Cardiac panel), EKG, head CT. Procedures: 
Procedures ED Course:  
5:47 PM Initial assessment performed.  The patients presenting problems have been discussed, and they are in agreement with the care plan formulated and outlined with them. I have encouraged them to ask questions as they arise throughout their visit. 7:26 PM Blood work now being drawn 9:00 PM Discussed patient's history, exam, and available diagnostics results with Nuno Arce MD, Emergency Medicine, who agrees with discharge. Work up negative. recommend that he follow up with cardiology as an outpatient. Pt understands and agrees and would like to be discharged. Diagnosis and Disposition DISCHARGE NOTE: 
9:03 PM 
Carol Duran  results have been reviewed with him. He has been counseled regarding his diagnosis, treatment, and plan. He verbally conveys understanding and agreement of the signs, symptoms, diagnosis, treatment and prognosis and additionally agrees to follow up as discussed. He also agrees with the care-plan and conveys that all of his questions have been answered. I have also provided discharge instructions for him that include: educational information regarding their diagnosis and treatment, and list of reasons why they would want to return to the ED prior to their follow-up appointment, should his condition change. He has been provided with education for proper emergency department utilization. CLINICAL IMPRESSION: 
 
1. Syncope, unspecified syncope type 2. Injury of head, initial encounter 3. Contusion of scalp, initial encounter PLAN: 
1. D/C Home 2. Discharge Medication List as of 9/18/2018  9:03 PM  
  
START taking these medications Details  
butalbital-acetaminophen-caff (FIORICET) -40 mg per capsule Take 1 Cap by mouth every four (4) hours as needed for Pain., Print, Disp-10 Cap, R-0  
  
  
 
3. Follow-up Information Follow up With Details Comments Contact Info Sudha Martin MD In 2 days for cardiology follow up [address] [facility] 
[phone] [facility] CLINIC Schedule an appointment as soon as possible for a visit in 2 days For general follow up [address] [place], [address] [place] [address] 
[phone] [facility] EMERGENCY DEPT Go to As needed, If symptoms worsen [address] [place] [address] 
[phone]  
  
 
_______________________________ Attestations: This note is prepared by Humberto Guajardo, acting as Scribe for Joaquina Red PA-C. Joaquina Red PA-C:  The scribe's documentation has been prepared under my direction and personally reviewed by me in its entirety.   I confirm that the note above accurately reflects all work, treatment, procedures, and medical decision making performed by me. 
_______________________________ 
 
DO NOT SIGN**

## 2018-09-18 NOTE — ED TRIAGE NOTES
Patient states that 2 weeks ago he was urinating in the bathroom and had a syncopal episode and hit his head. Patient states that his head is still hurting. Patient reports confusion.

## 2018-09-19 NOTE — ED NOTES
Patient up ambulating in room, demanding to have his iv removed, iv removed and TAYLOR Cook aware that patient wants to be discharged home. Patient refusing to have BP lying/sitting/standing done. Sebastian Cook aware.

## 2018-09-19 NOTE — DISCHARGE INSTRUCTIONS
Fainting: Care Instructions  Your Care Instructions    When you faint, or pass out, you lose consciousness for a short time. A brief drop in blood flow to the brain often causes it. When you fall or lie down, more blood flows to your brain and you regain consciousness. Emotional stress, pain, or overheating-especially if you have been standing-can make you faint. In these cases, fainting is usually not serious. But fainting can be a sign of a more serious problem. Your doctor may want you to have more tests to rule out other causes. The treatment you need depends on the reason why you fainted. The doctor has checked you carefully, but problems can develop later. If you notice any problems or new symptoms, get medical treatment right away. Follow-up care is a key part of your treatment and safety. Be sure to make and go to all appointments, and call your doctor if you are having problems. It's also a good idea to know your test results and keep a list of the medicines you take. How can you care for yourself at home? · Drink plenty of fluids to prevent dehydration. If you have kidney, heart, or liver disease and have to limit fluids, talk with your doctor before you increase your fluid intake. When should you call for help? Call 911 anytime you think you may need emergency care. For example, call if:    · You have symptoms of a heart problem. These may include:  ¨ Chest pain or pressure. ¨ Severe trouble breathing. ¨ A fast or irregular heartbeat. ¨ Lightheadedness or sudden weakness. ¨ Coughing up pink, foamy mucus. ¨ Passing out. After you call 911, the  may tell you to chew 1 adult-strength or 2 to 4 low-dose aspirin. Wait for an ambulance. Do not try to drive yourself.     · You have symptoms of a stroke. These may include:  ¨ Sudden numbness, tingling, weakness, or loss of movement in your face, arm, or leg, especially on only one side of your body. ¨ Sudden vision changes.   ¨ Sudden trouble speaking. ¨ Sudden confusion or trouble understanding simple statements. ¨ Sudden problems with walking or balance. ¨ A sudden, severe headache that is different from past headaches.     · You passed out (lost consciousness) again.    Watch closely for changes in your health, and be sure to contact your doctor if:    · You do not get better as expected. Where can you learn more? Go to http://glenn-mayra.info/. Enter U620 in the search box to learn more about \"Fainting: Care Instructions. \"  Current as of: November 20, 2017  Content Version: 11.7  © 9465-4993 [employer]. Care instructions adapted under license by [employer] (which disclaims liability or warranty for this information). If you have questions about a medical condition or this instruction, always ask your healthcare professional. [address] any warranty or liability for your use of this information. Contusion: Care Instructions  Your Care Instructions    Contusion is the medical term for a bruise. It is the result of a direct blow or an impact, such as a fall. Contusions are common sports injuries. Most people think of a bruise as a black-and-blue spot. This happens when small blood vessels get torn and leak blood under the skin. But bones, muscles, and organs can also get bruised. This may damage deep tissues but not cause a bruise you can see. The doctor will do a physical exam to find the location of your contusion. You may also have tests to make sure you do not have a more serious injury, such as a broken bone or nerve damage. These may include X-rays or other imaging tests like a CT scan or MRI. Deep-tissue contusions may cause pain and swelling. But if there is no serious damage, they will often get better in a few weeks with home treatment. The doctor has checked you carefully, but problems can develop later.  If you notice any problems or new symptoms, get medical treatment right away. Follow-up care is a key part of your treatment and safety. Be sure to make and go to all appointments, and call your doctor if you are having problems. It's also a good idea to know your test results and keep a list of the medicines you take. How can you care for yourself at home? · Put ice or a cold pack on the sore area for 10 to 20 minutes at a time to stop swelling. Put a thin cloth between the ice pack and your skin. · Be safe with medicines. Read and follow all instructions on the label. ¨ If the doctor gave you a prescription medicine for pain, take it as prescribed. ¨ If you are not taking a prescription pain medicine, ask your doctor if you can take an over-the-counter medicine. · If you can, prop up the sore area on pillows as much as possible for the next few days. Try to keep the sore area above the level of your heart. When should you call for help? Call your doctor now or seek immediate medical care if:    · Your pain gets worse.     · You have new or worse swelling.     · You have tingling, weakness, or numbness in the area near the contusion.     · The area near the contusion is cold or pale.    Watch closely for changes in your health, and be sure to contact your doctor if:    · You do not get better as expected. Where can you learn more? Go to http://glenn-mayra.info/. Enter J121 in the search box to learn more about \"Contusion: Care Instructions. \"  Current as of: November 20, 2017  Content Version: 11.7  © 6350-1733 [employer]. Care instructions adapted under license by [employer] (which disclaims liability or warranty for this information). If you have questions about a medical condition or this instruction, always ask your healthcare professional. [address] any warranty or liability for your use of this information.

## 2018-09-19 NOTE — ED NOTES
Patient agreed to orthostatics at this time. Orthostatics complete and shown to Sebastian Jung. Lyin/82  Pulse 64 Sittin/83  Pulse 73 Standin/93  Pulse 70

## 2018-09-19 NOTE — ED NOTES
I have reviewed discharge instructions with the patient. The patient verbalized understanding. Patient armband removed and shredded Patient d/c home in stable condition, ambulatory. No distress noted.

## 2018-10-27 LAB
ATRIAL RATE: 70 BPM
CALCULATED P AXIS, ECG09: 82 DEGREES
CALCULATED R AXIS, ECG10: 73 DEGREES
CALCULATED T AXIS, ECG11: 61 DEGREES
DIAGNOSIS, 93000: NORMAL
P-R INTERVAL, ECG05: 158 MS
Q-T INTERVAL, ECG07: 432 MS
QRS DURATION, ECG06: 120 MS
QTC CALCULATION (BEZET), ECG08: 466 MS
VENTRICULAR RATE, ECG03: 70 BPM

## 2019-04-15 ENCOUNTER — APPOINTMENT (OUTPATIENT)
Dept: GENERAL RADIOLOGY | Age: 29
End: 2019-04-15
Attending: PHYSICIAN ASSISTANT
Payer: SELF-PAY

## 2019-04-15 ENCOUNTER — HOSPITAL ENCOUNTER (EMERGENCY)
Age: 29
Discharge: HOME | End: 2019-04-15
Attending: EMERGENCY MEDICINE | Admitting: EMERGENCY MEDICINE
Payer: SELF-PAY

## 2019-04-15 VITALS
RESPIRATION RATE: 20 BRPM | HEIGHT: 71 IN | SYSTOLIC BLOOD PRESSURE: 141 MMHG | DIASTOLIC BLOOD PRESSURE: 72 MMHG | OXYGEN SATURATION: 100 % | BODY MASS INDEX: 35 KG/M2 | WEIGHT: 250 LBS | HEART RATE: 71 BPM | TEMPERATURE: 98 F

## 2019-04-15 DIAGNOSIS — R22.1 THROAT SWELLING: Primary | ICD-10-CM

## 2019-04-15 DIAGNOSIS — J30.89 ENVIRONMENTAL AND SEASONAL ALLERGIES: ICD-10-CM

## 2019-04-15 DIAGNOSIS — J02.9 PHARYNGITIS, UNSPECIFIED ETIOLOGY: ICD-10-CM

## 2019-04-15 PROCEDURE — 87081 CULTURE SCREEN ONLY: CPT

## 2019-04-15 PROCEDURE — 70360 X-RAY EXAM OF NECK: CPT

## 2019-04-15 PROCEDURE — 96374 THER/PROPH/DIAG INJ IV PUSH: CPT

## 2019-04-15 PROCEDURE — 99282 EMERGENCY DEPT VISIT SF MDM: CPT

## 2019-04-15 PROCEDURE — 74011250636 HC RX REV CODE- 250/636: Performed by: PHYSICIAN ASSISTANT

## 2019-04-15 PROCEDURE — 96375 TX/PRO/DX INJ NEW DRUG ADDON: CPT

## 2019-04-15 RX ORDER — DIPHENHYDRAMINE HYDROCHLORIDE 50 MG/ML
25 INJECTION, SOLUTION INTRAMUSCULAR; INTRAVENOUS
Status: COMPLETED | OUTPATIENT
Start: 2019-04-15 | End: 2019-04-15

## 2019-04-15 RX ORDER — CETIRIZINE HCL 10 MG
10 TABLET ORAL DAILY
Qty: 20 TAB | Refills: 0 | Status: SHIPPED | OUTPATIENT
Start: 2019-04-15 | End: 2019-08-22

## 2019-04-15 RX ORDER — DEXAMETHASONE SODIUM PHOSPHATE 4 MG/ML
16 INJECTION, SOLUTION INTRA-ARTICULAR; INTRALESIONAL; INTRAMUSCULAR; INTRAVENOUS; SOFT TISSUE
Status: COMPLETED | OUTPATIENT
Start: 2019-04-15 | End: 2019-04-15

## 2019-04-15 RX ORDER — CETIRIZINE HCL 10 MG
10 TABLET ORAL DAILY
Qty: 20 TAB | Refills: 0 | Status: SHIPPED | OUTPATIENT
Start: 2019-04-15 | End: 2019-04-15

## 2019-04-15 RX ORDER — PREDNISONE 50 MG/1
50 TABLET ORAL DAILY
Qty: 5 TAB | Refills: 0 | Status: SHIPPED | OUTPATIENT
Start: 2019-04-15 | End: 2019-04-20

## 2019-04-15 RX ORDER — FAMOTIDINE 10 MG/ML
20 INJECTION INTRAVENOUS
Status: COMPLETED | OUTPATIENT
Start: 2019-04-15 | End: 2019-04-15

## 2019-04-15 RX ADMIN — FAMOTIDINE 20 MG: 10 INJECTION, SOLUTION INTRAVENOUS at 11:46

## 2019-04-15 RX ADMIN — DIPHENHYDRAMINE HYDROCHLORIDE 25 MG: 50 INJECTION INTRAMUSCULAR; INTRAVENOUS at 11:41

## 2019-04-15 RX ADMIN — DEXAMETHASONE SODIUM PHOSPHATE 16 MG: 4 INJECTION, SOLUTION INTRAMUSCULAR; INTRAVENOUS at 11:43

## 2019-04-15 NOTE — LETTER
[facility] [place] 
THE [facility] EMERGENCY DEPT 
[address] [id number] 
[phone] Work/School Note Date: 4/15/2019 To Whom It May concern: 
 
Heidi Espinoza was seen and treated today in the emergency room by the following provider(s): 
Attending Provider: Rhoda Mendoza DO Physician Assistant: TYALOR Rahman.   
 
Heidi Espinoza may return to work on 4/16/2019.  
 
Sincerely, 
 
 
 
 
TAYLOR Buchanan

## 2019-04-15 NOTE — ED PROVIDER NOTES
EMERGENCY DEPARTMENT HISTORY AND PHYSICAL EXAM 
 
Date: 4/15/2019 Patient Name: Anila Sierra History of Presenting Illness Chief Complaint Patient presents with  Sore Throat History Provided By: Patient Anila Sierra is a 33 y.o. male with PMHX of seasonal allergies, asthma presents to the emergency room with a 2-day history of worsening sore throat. States that this morning he had a very difficult time swallowing. Muffled voice. Difficulty breathing. Similar issue last year where he states \"I had to have a tube put in my mouth to help me breathe\". Afebrile. Has been sneezing. Some postnasal drip. No sinus pain. Denies any fever, chills or achiness. Decreased energy. States works at [employer]. Supposed to be wearing a respirator but often does not do so. PCP: None Past History Past Medical History: 
Past Medical History:  
Diagnosis Date  Asthma Past Surgical History: 
Past Surgical History:  
Procedure Laterality Date  HX APPENDECTOMY Family History: 
History reviewed. No pertinent family history. Social History: 
Social History Tobacco Use  Smoking status: Never Smoker  Smokeless tobacco: Never Used Substance Use Topics  Alcohol use: Yes Comment: socially  Drug use: No  
 
 
Allergies: 
No Known Allergies Review of Systems Review of Systems Constitutional: Negative for chills, fatigue and fever. HENT: Positive for congestion, postnasal drip, rhinorrhea, sneezing, sore throat, trouble swallowing and voice change. Negative for drooling, ear pain, facial swelling, sinus pressure and sinus pain. Eyes: Negative. Respiratory: Positive for wheezing. Cardiovascular: Negative. Gastrointestinal: Negative. Skin: Negative. Neurological: Negative. Physical Exam  
 
Vitals:  
 04/15/19 1119 BP: 141/72 Pulse: 71 Resp: 20 Temp: 98 °F (36.7 °C) SpO2: 100% Weight: 113.4 kg (250 lb) Height: 5' 11\" (1.803 m) Physical Exam  
Constitutional: He is oriented to person, place, and time. Vital signs are normal. He appears well-developed and well-nourished. He is cooperative. He appears ill. No distress. HENT:  
Right Ear: Tympanic membrane normal.  
Left Ear: Tympanic membrane normal.  
Nose: Mucosal edema present. No rhinorrhea. Right sinus exhibits no maxillary sinus tenderness and no frontal sinus tenderness. Left sinus exhibits no maxillary sinus tenderness and no frontal sinus tenderness. Mouth/Throat: Mucous membranes are normal. No trismus in the jaw. Uvula swelling present. Increased nasal mucosal edema bilaterally. Tonsillar region/posterior oropharynx with increased edema bilaterally. Tonsils 3+ in size. Uvula edematous as well. Airway still patent although limited. No exudate seen to the tonsils. Increased erythema noted as well. Eyes: Pupils are equal, round, and reactive to light. Conjunctivae and EOM are normal. Right eye exhibits no chemosis. Left eye exhibits no chemosis. No periorbital edema/swelling Neck: Neck supple. No tracheal deviation present. Cardiovascular: Normal rate, regular rhythm and normal heart sounds. Pulmonary/Chest: Effort normal and breath sounds normal. No respiratory distress. He has no wheezes. He has no rales. Lymphadenopathy:  
  He has no cervical adenopathy. Neurological: He is alert and oriented to person, place, and time. Skin: Skin is warm and dry. No rash noted. He is not diaphoretic. Psychiatric: He has a normal mood and affect. Diagnostic Study Results Labs - Recent Results (from the past 12 hour(s)) STREP THROAT SCREEN Collection Time: 04/15/19 11:40 AM  
Result Value Ref Range Special Requests: NO SPECIAL REQUESTS Strep Screen NEGATIVE Strep Screen (NOTE) TEST PERFORMED BY THE [facility] ED ON 4/15/19. Culture result: PENDING Radiologic Studies XR NECK SOFT TISSUE  
 Final Result IMPRESSION:  
  
No acute radiographic abnormality identified involving the neck soft tissues. CT Results  (Last 48 hours) None CXR Results  (Last 48 hours) None Medications given in the ED- Medications  
dexamethasone (DECADRON) 4 mg/mL injection 16 mg (16 mg IntraVENous Given 4/15/19 1143) diphenhydrAMINE (BENADRYL) injection 25 mg (25 mg IntraVENous Given 4/15/19 1141) famotidine (PF) (PEPCID) injection 20 mg (20 mg IntraVENous Given 4/15/19 1146) Medical Decision Making I am the first provider for this patient. I reviewed the vital signs, available nursing notes, past medical history, past surgical history, family history and social history. Vital Signs-Reviewed the patient's vital signs. Pulse Oximetry Analysis 100% on room air Records Reviewed: Nursing Notes and Old Medical Records Procedures: 
Procedures ED Course:  
 
Initial assessment performed. The patients presenting problems have been discussed, and they are in agreement with the care plan formulated and outlined with them. I have encouraged them to ask questions as they arise throughout their visit. Discussion: 33 y.o. male presents emergency room with difficulty swallowing and breathing as well as a sore throat. This is been going on for the last 24-48 hours. States that he woke up this morning unable to talk due to the swelling in his throat. Examination here concerning for increased edema in the back of throat. Possible angioedema. Also could be related to viral pharyngitis, tonsillitis, strep, allergies. Had a similar issue last year where he stated that he had to be intubated to help him breathe. Unable to find that information. He was moved from fast track to bed 3 on the urgent side so he can be monitored a little bit more closely.  
 
Patient was given dexamethasone here to help reduce the swelling in the back of his throat. He was also given Benadryl and Pepcid just in case this was allergic. Rapid strep came back negative. No other labs were done on the patient. X-ray was obtained of the soft tissue of the neck which revealed no signs of any inflammation or airway issues. When rechecking patient, he was definitely talking better. Voice seem more clear. Swelling the back of the throat was reduced. He was advised of his negative results. Patient will be discharged home on an antihistamine and a steroid taper. Recommended wearing his mask at work at the [employer]. This may be allergy related. Will need to follow-up with his PCP. Diagnosis and Disposition DISCHARGE NOTE: 
Fatimah Cruz  results have been reviewed with him. He has been counseled regarding his diagnosis, treatment, and plan. He verbally conveys understanding and agreement of the signs, symptoms, diagnosis, treatment and prognosis and additionally agrees to follow up as discussed. He also agrees with the care-plan and conveys that all of his questions have been answered. I have also provided discharge instructions for him that include: educational information regarding their diagnosis and treatment, and list of reasons why they would want to return to the ED prior to their follow-up appointment, should his condition change. He has been provided with education for proper emergency department utilization. CLINICAL IMPRESSION: 
 
1. Throat swelling 2. Pharyngitis, unspecified etiology 3. Environmental and seasonal allergies PLAN: 
1. D/C Home 2. Current Discharge Medication List  
  
START taking these medications Details  
predniSONE (DELTASONE) 50 mg tablet Take 1 Tab by mouth daily for 5 days. Qty: 5 Tab, Refills: 0 Associated Diagnoses: Throat swelling; Pharyngitis, unspecified etiology  
  
cetirizine (ZYRTEC) 10 mg tablet Take 1 Tab by mouth daily.  Indications: inflammation of the nose due to an allergy 
Qty: 20 Tab, Refills: 0  
  
  
 
3. Follow-up Information Follow up With Specialties Details Why Contact Info None  Call in 1 day Need follow up appointment to address allergies None ([phone]) Patient stated that they have no PCP None   If symptoms worsen, As needed None (395) Patient stated that they have no PCP Please note that this dictation was completed with [email or web address], the computer voice recognition software. Quite often unanticipated grammatical, syntax, homophones, and other interpretive errors are inadvertently transcribed by the computer software. Please disregard these errors. Please excuse any errors that have escaped final proofreading.

## 2019-04-17 LAB
B-HEM STREP THROAT QL CULT: NEGATIVE
B-HEM STREP THROAT QL CULT: NORMAL
BACTERIA SPEC CULT: NORMAL
SERVICE CMNT-IMP: NORMAL

## 2019-07-12 NOTE — PERIOP NOTES
TRANSFER - OUT REPORT:    Verbal report given to TRANSFER - OUT REPORT:    Verbal report given to (name) on [facility]  being transferred to [address] [place] [address](unit) for routine post - op       Report consisted of patients Situation, Background, Assessment and   Recommendations(SBAR). Information from the following report(s) SBAR, Intake/Output and MAR was reviewed with the receiving nurse. Lines:   Peripheral IV 09/28/17 Right Antecubital (Active)   Site Assessment Clean, dry, & intact 9/28/2017  3:00 PM   Phlebitis Assessment 0 9/28/2017  3:00 PM   Infiltration Assessment 0 9/28/2017  3:00 PM   Dressing Status Clean, dry, & intact 9/28/2017  3:00 PM   Dressing Type Transparent;Tape 9/28/2017  3:00 PM   Hub Color/Line Status Pink; Infusing 9/28/2017  3:00 PM        Opportunity for questions and clarification was provided. Patient transported with:   O2 @ 2 liters  Registered Nurse  Tech        (name) on [facility]  being transferred to [address] [place] [address](unit) for routine post - op       Report consisted of patients Situation, Background, Assessment and   Recommendations(SBAR). Information from the following report(s) SBAR, Intake/Output and MAR was reviewed with the receiving nurse. Lines:   Peripheral IV 09/28/17 Right Antecubital (Active)   Site Assessment Clean, dry, & intact 9/28/2017  3:00 PM   Phlebitis Assessment 0 9/28/2017  3:00 PM   Infiltration Assessment 0 9/28/2017  3:00 PM   Dressing Status Clean, dry, & intact 9/28/2017  3:00 PM   Dressing Type Transparent;Tape 9/28/2017  3:00 PM   Hub Color/Line Status Pink; Infusing 9/28/2017  3:00 PM        Opportunity for questions and clarification was provided.       Patient transported with:   O2 @ 2 liters  Registered Nurse  [address] [employer] Unknown if ever smoked

## 2019-08-22 ENCOUNTER — HOSPITAL ENCOUNTER (EMERGENCY)
Age: 29
Discharge: HOME | End: 2019-08-22
Attending: EMERGENCY MEDICINE
Payer: COMMERCIAL

## 2019-08-22 VITALS
DIASTOLIC BLOOD PRESSURE: 81 MMHG | OXYGEN SATURATION: 100 % | WEIGHT: 250 LBS | SYSTOLIC BLOOD PRESSURE: 150 MMHG | TEMPERATURE: 98.5 F | HEART RATE: 72 BPM | HEIGHT: 71 IN | RESPIRATION RATE: 18 BRPM | BODY MASS INDEX: 35 KG/M2

## 2019-08-22 DIAGNOSIS — N34.2 URETHRITIS: ICD-10-CM

## 2019-08-22 DIAGNOSIS — Z20.2 POSSIBLE EXPOSURE TO STD: Primary | ICD-10-CM

## 2019-08-22 LAB
APPEARANCE UR: CLEAR
BACTERIA URNS QL MICRO: ABNORMAL /HPF
BILIRUB UR QL: NEGATIVE
COLOR UR: YELLOW
EPITH CASTS URNS QL MICRO: ABNORMAL /LPF (ref 0–5)
GLUCOSE UR STRIP.AUTO-MCNC: NEGATIVE MG/DL
HGB UR QL STRIP: NEGATIVE
KETONES UR QL STRIP.AUTO: NEGATIVE MG/DL
LEUKOCYTE ESTERASE UR QL STRIP.AUTO: ABNORMAL
NITRITE UR QL STRIP.AUTO: NEGATIVE
PH UR STRIP: 6.5 [PH] (ref 5–8)
PROT UR STRIP-MCNC: NEGATIVE MG/DL
RBC #/AREA URNS HPF: ABNORMAL /HPF (ref 0–5)
SP GR UR REFRACTOMETRY: 1.02 (ref 1–1.03)
UROBILINOGEN UR QL STRIP.AUTO: 1 EU/DL (ref 0.2–1)
WBC URNS QL MICRO: ABNORMAL /HPF (ref 0–5)

## 2019-08-22 PROCEDURE — 74011250636 HC RX REV CODE- 250/636: Performed by: PHYSICIAN ASSISTANT

## 2019-08-22 PROCEDURE — 96372 THER/PROPH/DIAG INJ SC/IM: CPT

## 2019-08-22 PROCEDURE — 99283 EMERGENCY DEPT VISIT LOW MDM: CPT

## 2019-08-22 PROCEDURE — 74011250637 HC RX REV CODE- 250/637: Performed by: PHYSICIAN ASSISTANT

## 2019-08-22 PROCEDURE — 81001 URINALYSIS AUTO W/SCOPE: CPT

## 2019-08-22 PROCEDURE — 87491 CHLMYD TRACH DNA AMP PROBE: CPT

## 2019-08-22 RX ORDER — AZITHROMYCIN 250 MG/1
1000 TABLET, FILM COATED ORAL
Status: COMPLETED | OUTPATIENT
Start: 2019-08-22 | End: 2019-08-22

## 2019-08-22 RX ORDER — ONDANSETRON 4 MG/1
4 TABLET, ORALLY DISINTEGRATING ORAL
Status: COMPLETED | OUTPATIENT
Start: 2019-08-22 | End: 2019-08-22

## 2019-08-22 RX ADMIN — AZITHROMYCIN 1000 MG: 250 TABLET, FILM COATED ORAL at 10:55

## 2019-08-22 RX ADMIN — LIDOCAINE HYDROCHLORIDE 250 MG: 10 INJECTION, SOLUTION EPIDURAL; INFILTRATION; INTRACAUDAL; PERINEURAL at 10:55

## 2019-08-22 RX ADMIN — ONDANSETRON 4 MG: 4 TABLET, ORALLY DISINTEGRATING ORAL at 10:54

## 2019-08-22 NOTE — ED PROVIDER NOTES
EMERGENCY DEPARTMENT HISTORY AND PHYSICAL EXAM    Date: 8/22/2019  Patient Name: Mamie Hughes    History of Presenting Illness     Chief Complaint   Patient presents with    Urinary Burning    Sexually Transmitted Disease         History Provided By: Patient    Chief Complaint: STD    HPI(Context):   10:25 AM  Mamie Hughes is a 33 y.o. male with PMHX of asthma who presents to the emergency department C/O possible exposure to STD. Associated sxs include dysuria and painful ejaculation. Sxs x 2-3 days. Reports several new sexual partners. Pt denies penile discharge, hematuria, testicle pain, and any other sxs or complaints. PCP: None        Past History     Past Medical History:  Past Medical History:   Diagnosis Date    Asthma        Past Surgical History:  Past Surgical History:   Procedure Laterality Date    HX APPENDECTOMY         Family History:  History reviewed. No pertinent family history. Social History:  Social History     Tobacco Use    Smoking status: Never Smoker    Smokeless tobacco: Never Used   Substance Use Topics    Alcohol use: Yes     Comment: socially     Drug use: Yes     Types: Marijuana       Allergies:  No Known Allergies      Review of Systems   Review of Systems   Constitutional: Negative for fever. Gastrointestinal: Negative for abdominal pain. Genitourinary: Positive for dysuria. Negative for discharge, frequency, hematuria, penile pain, penile swelling, scrotal swelling, testicular pain and urgency. Musculoskeletal: Negative for back pain. All other systems reviewed and are negative. Physical Exam     Vitals:    08/22/19 1018   BP: 150/81   Pulse: 72   Resp: 18   Temp: 98.5 °F (36.9 °C)   SpO2: 100%   Weight: 113.4 kg (250 lb)   Height: 5' 11\" (1.803 m)     Physical Exam   Constitutional: He is oriented to person, place, and time. He appears well-developed and well-nourished. No distress. AA male in NAD.  Appears comfortable    HENT:   Head: Normocephalic and atraumatic. Right Ear: External ear normal.   Left Ear: External ear normal.   Nose: Nose normal.   Mouth/Throat: Mucous membranes are normal.   Eyes: Conjunctivae are normal.   Neck: Normal range of motion. Cardiovascular: Normal rate, regular rhythm, normal heart sounds and intact distal pulses. Exam reveals no gallop and no friction rub. No murmur heard. Pulmonary/Chest: Effort normal and breath sounds normal. No accessory muscle usage. No tachypnea. No respiratory distress. He has no decreased breath sounds. He has no wheezes. He has no rhonchi. He has no rales. Abdominal: Normal appearance. Musculoskeletal: Normal range of motion. Neurological: He is alert and oriented to person, place, and time. Skin: Skin is warm and dry. He is not diaphoretic. Psychiatric: He has a normal mood and affect. Judgment normal.   Nursing note and vitals reviewed.           Diagnostic Study Results     Labs -     Recent Results (from the past 12 hour(s))   URINALYSIS W/ RFLX MICROSCOPIC    Collection Time: 08/22/19 11:00 AM   Result Value Ref Range    Color YELLOW      Appearance CLEAR      Specific gravity 1.023 1.005 - 1.030      pH (UA) 6.5 5.0 - 8.0      Protein NEGATIVE  NEG mg/dL    Glucose NEGATIVE  NEG mg/dL    Ketone NEGATIVE  NEG mg/dL    Bilirubin NEGATIVE  NEG      Blood NEGATIVE  NEG      Urobilinogen 1.0 0.2 - 1.0 EU/dL    Nitrites NEGATIVE  NEG      Leukocyte Esterase MODERATE (A) NEG           No orders to display     CT Results  (Last 48 hours)    None        CXR Results  (Last 48 hours)    None          Medications given in the ED-  Medications   cefTRIAXone (ROCEPHIN) 250 mg in lidocaine (PF) (XYLOCAINE) 10 mg/mL (1 %) IM injection (250 mg IntraMUSCular Given 8/22/19 1055)   azithromycin (ZITHROMAX) tablet 1,000 mg (1,000 mg Oral Given 8/22/19 1055)   ondansetron (ZOFRAN ODT) tablet 4 mg (4 mg Oral Given 8/22/19 1054)         Medical Decision Making   I am the first provider for this patient. I reviewed the vital signs, available nursing notes, past medical history, past surgical history, family history and social history. Vital Signs-Reviewed the patient's vital signs. Pulse Oximetry Analysis - 100% on RA     Records Reviewed: Nursing Notes    Provider Notes (Medical Decision Making): STD, UTI, urethritis, stricture    Procedures:  Procedures    ED Course:   10:25 AM Initial assessment performed. The patients presenting problems have been discussed, and they are in agreement with the care plan formulated and outlined with them. I have encouraged them to ask questions as they arise throughout their visit. Diagnosis and Disposition       Will tx empirically for STI and await GC/C culture. FU for more complete STI testing. Reasons to RTED discussed with pt. All questions answered. Pt feels comfortable going home at this time. Pt expressed understanding and he agrees with plan. 1. Possible exposure to STD    2. Urethritis        PLAN:  1. D/C Home  2. There are no discharge medications for this patient. 3.   Follow-up Information     Follow up With Specialties Details Why [address]    [address]    THE [facility] EMERGENCY DEPT Emergency Medicine  As needed, If symptoms worsen [address] [address]  [phone]        _______________________________    Attestations: This note is prepared by Juan Antonio Cervantes PA-C.  _______________________________        Please note that this dictation was completed with [email or web address], the computer voice recognition software. Quite often unanticipated grammatical, syntax, homophones, and other interpretive errors are inadvertently transcribed by the computer software. Please disregard these errors. Please excuse any errors that have escaped final proofreading.

## 2019-08-22 NOTE — ED TRIAGE NOTES
Patient ambulate to ED for urinary burning x 3days, patient states he may have a UTI or STD, a/ox4, moves all extremities, patient denies original complaint of chest pain

## 2020-05-27 ENCOUNTER — HOSPITAL ENCOUNTER (EMERGENCY)
Age: 30
Discharge: HOME | End: 2020-05-27
Attending: EMERGENCY MEDICINE | Admitting: EMERGENCY MEDICINE
Payer: COMMERCIAL

## 2020-05-27 VITALS
TEMPERATURE: 97.9 F | WEIGHT: 250 LBS | SYSTOLIC BLOOD PRESSURE: 121 MMHG | HEART RATE: 59 BPM | OXYGEN SATURATION: 100 % | BODY MASS INDEX: 35 KG/M2 | DIASTOLIC BLOOD PRESSURE: 66 MMHG | RESPIRATION RATE: 14 BRPM | HEIGHT: 71 IN

## 2020-05-27 DIAGNOSIS — R30.0 DYSURIA: Primary | ICD-10-CM

## 2020-05-27 DIAGNOSIS — Z20.2 POSSIBLE EXPOSURE TO STD: ICD-10-CM

## 2020-05-27 LAB
APPEARANCE UR: CLEAR
BILIRUB UR QL: NEGATIVE
COLOR UR: YELLOW
GLUCOSE UR STRIP.AUTO-MCNC: NEGATIVE MG/DL
HGB UR QL STRIP: NEGATIVE
KETONES UR QL STRIP.AUTO: NEGATIVE MG/DL
LEUKOCYTE ESTERASE UR QL STRIP.AUTO: NEGATIVE
NITRITE UR QL STRIP.AUTO: NEGATIVE
PH UR STRIP: 7 [PH] (ref 5–8)
PROT UR STRIP-MCNC: NEGATIVE MG/DL
SP GR UR REFRACTOMETRY: 1.02 (ref 1–1.03)
UROBILINOGEN UR QL STRIP.AUTO: 0.2 EU/DL (ref 0.2–1)

## 2020-05-27 PROCEDURE — 99283 EMERGENCY DEPT VISIT LOW MDM: CPT

## 2020-05-27 PROCEDURE — 74011250637 HC RX REV CODE- 250/637: Performed by: EMERGENCY MEDICINE

## 2020-05-27 PROCEDURE — 87086 URINE CULTURE/COLONY COUNT: CPT

## 2020-05-27 PROCEDURE — 96372 THER/PROPH/DIAG INJ SC/IM: CPT

## 2020-05-27 PROCEDURE — 74011250636 HC RX REV CODE- 250/636: Performed by: EMERGENCY MEDICINE

## 2020-05-27 PROCEDURE — 74011000250 HC RX REV CODE- 250: Performed by: EMERGENCY MEDICINE

## 2020-05-27 PROCEDURE — 81003 URINALYSIS AUTO W/O SCOPE: CPT

## 2020-05-27 PROCEDURE — 87491 CHLMYD TRACH DNA AMP PROBE: CPT

## 2020-05-27 RX ORDER — AZITHROMYCIN 250 MG/1
1000 TABLET, FILM COATED ORAL
Status: COMPLETED | OUTPATIENT
Start: 2020-05-27 | End: 2020-05-27

## 2020-05-27 RX ADMIN — AZITHROMYCIN 1000 MG: 250 TABLET, FILM COATED ORAL at 11:32

## 2020-05-27 RX ADMIN — LIDOCAINE HYDROCHLORIDE 250 MG: 10 INJECTION, SOLUTION EPIDURAL; INFILTRATION; INTRACAUDAL; PERINEURAL at 11:32

## 2020-05-27 NOTE — ED PROVIDER NOTES
EMERGENCY DEPARTMENT HISTORY AND PHYSICAL EXAM    Date: 5/27/2020  Patient Name: Shawn Gonzalez    History of Presenting Illness     Chief Complaint   Patient presents with    Urinary Frequency         History Provided By: Patient    11:02 AM  Shawn Gonzalez is a 26 y.o. male with no significant PMHX who presents to the emergency department C/O dysuria and urinary frequency. Patient states he has noticed the symptoms for about 5 days. He states he is sexually active with multiple female partners. He denies any abdominal pain, fever, nausea, vomiting, genital lesions, other complaints. PCP: None    Current Facility-Administered Medications   Medication Dose Route Frequency Provider Last Rate Last Dose    cefTRIAXone (ROCEPHIN) 250 mg in lidocaine (PF) (XYLOCAINE) 10 mg/mL (1 %) IM injection  250 mg IntraMUSCular NOW Neal Weller MD        azithromycin (ZITHROMAX) tablet 1,000 mg  1,000 mg Oral NOW Neal Weller MD           Past History     Past Medical History:  Past Medical History:   Diagnosis Date    Asthma        Past Surgical History:  Past Surgical History:   Procedure Laterality Date    HX APPENDECTOMY         Family History:  History reviewed. No pertinent family history. Social History:  Social History     Tobacco Use    Smoking status: Never Smoker    Smokeless tobacco: Never Used   Substance Use Topics    Alcohol use: Yes     Comment: socially     Drug use: Yes     Types: Marijuana       Allergies:  No Known Allergies      Review of Systems   Review of Systems   Constitutional: Negative for fever. Gastrointestinal: Negative for abdominal pain. Genitourinary: Positive for dysuria and frequency. Negative for genital sores. All other systems reviewed and are negative.         Physical Exam     Vitals:    05/27/20 1052   BP: 121/66   Pulse: (!) 59   Resp: 14   Temp: 97.9 °F (36.6 °C)   SpO2: 100%   Weight: 113.4 kg (250 lb)   Height: 5' 11\" (1.803 m)     Physical Exam    Nursing notes and vital signs reviewed    Constitutional: Non toxic appearing, no acute distress  Head: Normocephalic, Atraumatic  Eyes: EOMI  Neck: Supple  Chest: Normal work of breathing and chest excursion bilaterally  Abdomen: Soft, non tender, non distended  Back: No evidence of trauma or deformity  Extremities: No evidence of trauma or deformity, no LE edema  Skin: Warm and dry, normal cap refill  Neuro: Alert and appropriate  Psychiatric: Normal mood and affect      Diagnostic Study Results     Labs -     Recent Results (from the past 12 hour(s))   URINALYSIS W/ RFLX MICROSCOPIC    Collection Time: 05/27/20 10:50 AM   Result Value Ref Range    Color YELLOW      Appearance CLEAR      Specific gravity 1.018 1.005 - 1.030      pH (UA) 7.0 5.0 - 8.0      Protein Negative NEG mg/dL    Glucose Negative NEG mg/dL    Ketone Negative NEG mg/dL    Bilirubin Negative NEG      Blood Negative NEG      Urobilinogen 0.2 0.2 - 1.0 EU/dL    Nitrites Negative NEG      Leukocyte Esterase Negative NEG         Radiologic Studies -   No orders to display     CT Results  (Last 48 hours)    None        CXR Results  (Last 48 hours)    None          Medications given in the ED-  Medications   cefTRIAXone (ROCEPHIN) 250 mg in lidocaine (PF) (XYLOCAINE) 10 mg/mL (1 %) IM injection (has no administration in time range)   azithromycin (ZITHROMAX) tablet 1,000 mg (has no administration in time range)         Medical Decision Making   I am the first provider for this patient. I reviewed the vital signs, available nursing notes, past medical history, past surgical history, family history and social history. Vital Signs-Reviewed the patient's vital signs. Records Reviewed: Nursing Notes    Provider Notes (Medical Decision Making): Juan F James is a 26 y.o. male nontoxic and hemodynamically stable. Opted for prophylactic treatment of gonorrhea and chlamydia here in the emergency department. Culture of urine sent.   Encourage patient to follow-up with health department for further STD testing including syphilis and HIV. Plan for discharge with return precautions. Patient understands and agrees with this plan. Procedures:  Procedures    ED Course:   11:24 AM  Updated patient on all results and plan. All questions answered. Diagnosis and Disposition     Critical Care: None    DISCHARGE NOTE:    Luis January  results have been reviewed with him. He has been counseled regarding his diagnosis, treatment, and plan. He verbally conveys understanding and agreement of the signs, symptoms, diagnosis, treatment and prognosis and additionally agrees to follow up as discussed. He also agrees with the care-plan and conveys that all of his questions have been answered. I have also provided discharge instructions for him that include: educational information regarding their diagnosis and treatment, and list of reasons why they would want to return to the ED prior to their follow-up appointment, should his condition change. He has been provided with education for proper emergency department utilization. CLINICAL IMPRESSION:    1. Dysuria    2. Possible exposure to STD        PLAN:  1. D/C Home  2. There are no discharge medications for this patient. 3.   Follow-up Information     Follow up With Specialties Details Why [address]  Schedule an appointment as soon as possible for a visit  [address] [place] [address]    [facility] EMERGENCY DEPT Emergency Medicine  If symptoms worsen [address] [place] [id number]  [phone]        _______________________________      Please note that this dictation was completed with [employer], the computer voice recognition software. Quite often unanticipated grammatical, syntax, homophones, and other interpretive errors are inadvertently transcribed by the computer software. Please disregard these errors.   Please excuse any errors that have escaped final proofreading.

## 2020-05-27 NOTE — DISCHARGE INSTRUCTIONS
Patient Education        Exposure to Sexually Transmitted Infections: Care Instructions  Your Care Instructions  Sexually transmitted infections (STIs) are those diseases spread by sexual contact. There are at least 20 different STIs, including chlamydia, gonorrhea, syphilis, and human immunodeficiency virus (HIV), which causes AIDS. Bacteria-caused STIs can be treated and cured. STIs caused by viruses, such as HIV, can be treated but not cured. Some STIs can reduce a woman's chances of getting pregnant in the future. STIs are spread during sexual contact, such as vaginal intercourse and oral or anal sex. Follow-up care is a key part of your treatment and safety. Be sure to make and go to all appointments, and call your doctor if you are having problems. It's also a good idea to know your test results and keep a list of the medicines you take. How can you care for yourself at home? · Your doctor may have given you a shot of antibiotics. If your doctor prescribed antibiotic pills, take them as directed. Do not stop taking them just because you feel better. You need to take the full course of antibiotics. · Do not have sexual contact while you have symptoms of an STI or are being treated for an STI. · Tell your sex partner (or partners) that he or she will need treatment. · If you are a woman, do not douche. Douching changes the normal balance of bacteria in the vagina and may spread an infection up into your reproductive organs. To prevent exposure to STIs in the future  · Use latex condoms every time you have sex. Use them from the beginning to the end of sexual contact. · Talk to your partner before you have sex. Find out if he or she has or is at risk for any STI. Keep in mind that a person may be able to spread an STI even if he or she does not have symptoms. · Do not have sex if you are being treated for an STI.   · Do not have sex with anyone who has symptoms of an STI, such as sores on the genitals or mouth.  · Having one sex partner (who does not have STIs and does not have sex with anyone else) is a good way to avoid STIs. When should you call for help? Call your doctor now or seek immediate medical care if:    · You have new pain in your belly or pelvis.     · You have symptoms of a urinary tract infection. These may include:  ? Pain or burning when you urinate. ? A frequent need to urinate without being able to pass much urine. ? Pain in the flank, which is just below the rib cage and above the waist on either side of the back. ? Blood in your urine. ? A fever.     · You have new or worsening pain or swelling in the scrotum.    Watch closely for changes in your health, and be sure to contact your doctor if:    · You have unusual vaginal bleeding.     · You have a discharge from the vagina or penis.     · You have any new symptoms, such as sores, bumps, rashes, blisters, or warts.     · You have itching, tingling, pain, or burning in the genital or anal area.     · You think you may have an STI. Where can you learn more? Go to http://glenn-mayra.info/  Enter M049 in the search box to learn more about \"Exposure to Sexually Transmitted Infections: Care Instructions. \"  Current as of: July 7, 2019Content Version: 12.4  © 4077-9620 Health[employer] Inc[employer]. Care instructions adapted under license by [employer] (which disclaims liability or warranty for this information). If you have questions about a medical condition or this instruction, always ask your healthcare professional. [place] [address] any warranty or liability for your use of this information. Patient Education        Painful Urination (Dysuria): Care Instructions  Your Care Instructions  Burning pain with urination (dysuria) is a common symptom of a urinary tract infection or other urinary problems. The bladder may become inflamed. This can cause pain when the bladder fills and empties. You may also feel pain if the tube that carries urine from the bladder to the outside of the body (urethra) gets irritated or infected. Sexually transmitted infections (STIs) also may cause pain when you urinate. Sometimes the pain can be caused by things other than an infection. The urethra can be irritated by soaps, perfumes, or foreign objects in the urethra. Kidney stones can cause pain when they pass through the urethra. The cause may be hard to find. You may need tests. Treatment for painful urination depends on the cause. Follow-up care is a key part of your treatment and safety. Be sure to make and go to all appointments, and call your doctor if you are having problems. It's also a good idea to know your test results and keep a list of the medicines you take. How can you care for yourself at home? · Drink extra water for the next day or two. This will help make the urine less concentrated. (If you have kidney, heart, or liver disease and have to limit fluids, talk with your doctor before you increase the amount of fluids you drink.)  · Avoid drinks that are carbonated or have caffeine. They can irritate the bladder. · Urinate often. Try to empty your bladder each time. For women:  · Urinate right after you have sex. · After going to the bathroom, wipe from front to back. · Avoid douches, bubble baths, and feminine hygiene sprays. And avoid other feminine hygiene products that have deodorants. When should you call for help? Call your doctor now or seek immediate medical care if:    · You have new symptoms, such as fever, nausea, or vomiting.     · You have new or worse symptoms of a urinary problem. For example:  ? You have blood or pus in your urine. ? You have chills or body aches. ? It hurts worse to urinate. ? You have groin or belly pain. ?  You have pain in your back just below your rib cage (the flank area).    Watch closely for changes in your health, and be sure to contact your doctor if you have any problems. Where can you learn more? Go to http://glenn-mayra.info/  Enter H814 in the search box to learn more about \"Painful Urination (Dysuria): Care Instructions. \"  Current as of: August 21, 2019Content Version: 12.4  © 9683-5517 Healthwise, Incorporated. Care instructions adapted under license by [employer] (which disclaims liability or warranty for this information). If you have questions about a medical condition or this instruction, always ask your healthcare professional. [address] any warranty or liability for your use of this information.

## 2020-05-28 LAB
BACTERIA SPEC CULT: NORMAL
C TRACH RRNA SPEC QL NAA+PROBE: NEGATIVE
CC UR VC: NORMAL
N GONORRHOEA RRNA SPEC QL NAA+PROBE: NEGATIVE
SERVICE CMNT-IMP: NORMAL
SPECIMEN SOURCE: NORMAL

## 2020-07-17 ENCOUNTER — HOSPITAL ENCOUNTER (EMERGENCY)
Age: 30
Discharge: HOME | End: 2020-07-17
Attending: EMERGENCY MEDICINE
Payer: COMMERCIAL

## 2020-07-17 VITALS
WEIGHT: 200 LBS | RESPIRATION RATE: 16 BRPM | HEART RATE: 83 BPM | TEMPERATURE: 98 F | DIASTOLIC BLOOD PRESSURE: 83 MMHG | BODY MASS INDEX: 28 KG/M2 | OXYGEN SATURATION: 99 % | SYSTOLIC BLOOD PRESSURE: 142 MMHG | HEIGHT: 71 IN

## 2020-07-17 DIAGNOSIS — S01.00XA OPEN WOUND OF SCALP, UNSPECIFIED OPEN WOUND TYPE, INITIAL ENCOUNTER: ICD-10-CM

## 2020-07-17 DIAGNOSIS — S09.90XA CLOSED HEAD INJURY, INITIAL ENCOUNTER: Primary | ICD-10-CM

## 2020-07-17 DIAGNOSIS — Z23 NEED FOR TETANUS BOOSTER: ICD-10-CM

## 2020-07-17 PROCEDURE — 90471 IMMUNIZATION ADMIN: CPT

## 2020-07-17 PROCEDURE — 90715 TDAP VACCINE 7 YRS/> IM: CPT | Performed by: PHYSICIAN ASSISTANT

## 2020-07-17 PROCEDURE — 74011250636 HC RX REV CODE- 250/636: Performed by: PHYSICIAN ASSISTANT

## 2020-07-17 PROCEDURE — 99282 EMERGENCY DEPT VISIT SF MDM: CPT

## 2020-07-17 RX ADMIN — TETANUS TOXOID, REDUCED DIPHTHERIA TOXOID AND ACELLULAR PERTUSSIS VACCINE, ADSORBED 0.5 ML: 5; 2.5; 8; 8; 2.5 SUSPENSION INTRAMUSCULAR at 19:55

## 2020-07-17 NOTE — DISCHARGE INSTRUCTIONS
Patient Education        Learning About a Closed Head Injury  What is a closed head injury? A closed head injury happens when your head gets hit hard. The strong force of the blow causes your brain to shake in your skull. This movement can cause the brain to bruise, swell, or tear. Sometimes nerves or blood vessels also get damaged. This can cause bleeding in or around the brain. A concussion is a type of closed head injury. What are the symptoms? If you have a mild concussion, you may have a mild headache or feel \"not quite right. \" These symptoms are common. They usually go away over a few days to 4 weeks. But sometimes after a concussion, you feel like you can't function as well as before the injury. And you have new symptoms. This is called postconcussive syndrome. You may:  · Find it harder to solve problems, think, concentrate, or remember. · Have headaches. · Have changes in your sleep patterns, such as not being able to sleep or sleeping all the time. · Have changes in your personality. · Not be interested in your usual activities. · Feel angry or anxious without a clear reason. · Lose your sense of taste or smell. · Be dizzy, lightheaded, or unsteady. It may be hard to stand or walk. How is a closed head injury treated? Any person who may have a concussion needs to see a doctor. Some people have to stay in the hospital to be watched. Others can go home safely. If you go home, follow your doctor's instructions. He or she will tell you if you need someone to watch you closely for the next 24 hours or longer. Rest is the best treatment. Get plenty of sleep at night. And try to rest during the day. · Avoid activities that are physically or mentally demanding. These include housework, exercise, and schoolwork. And don't play video games, send text messages, or use the computer. You may need to change your school or work schedule to be able to avoid these activities.   · Ask your doctor when it's okay to drive, ride a bike, or operate machinery. · Take an over-the-counter pain medicine, such as acetaminophen (Tylenol), ibuprofen (Advil, Motrin), or naproxen (Aleve). Be safe with medicines. Read and follow all instructions on the label. · Check with your doctor before you use any other medicines for pain. · Do not drink alcohol or use illegal drugs. They can slow recovery. They can also increase your risk of getting a second head injury. Follow-up care is a key part of your treatment and safety. Be sure to make and go to all appointments, and call your doctor if you are having problems. It's also a good idea to know your test results and keep a list of the medicines you take. Where can you learn more? Go to http://glenn-mayra.info/  Enter E235 in the search box to learn more about \"Learning About a Closed Head Injury. \"  Current as of: November 20, 2019               Content Version: 12.5  © 0064-0425 [employer]. Care instructions adapted under license by [employer] (which disclaims liability or warranty for this information). If you have questions about a medical condition or this instruction, always ask your healthcare professional. [address] any warranty or liability for your use of this information. Patient Education        Wound Check: Care Instructions  Your Care Instructions  People have wounds that need care for many reasons. You may have a cut that needs care after surgery. You may have a cut or puncture wound from an accident. Or you may have a wound because of a condition like diabetes. Whatever the cause of your wound, there are things you can do to care for it at home. Your doctor may also want you to come back for a wound check. The wound check lets the doctor know how your wound is healing and if you need more treatment. Follow-up care is a key part of your treatment and safety.  Be sure to make and go to all appointments, and call your doctor if you are having problems. It's also a good idea to know your test results and keep a list of the medicines you take. How can you care for yourself at home? · If your doctor told you how to care for your wound, follow your doctor's instructions. If you did not get instructions, follow this general advice:  ? You may cover the wound with a thin layer of petroleum jelly, such as Vaseline, and a nonstick bandage. ? Apply more petroleum jelly and replace the bandage as needed. · Keep the wound dry for the first 24 to 48 hours. After this, you can shower if your doctor okays it. Pat the wound dry. · Be safe with medicines. Read and follow all instructions on the label. ? If the doctor gave you a prescription medicine for pain, take it as prescribed. ? If you are not taking a prescription pain medicine, ask your doctor if you can take an over-the-counter medicine. · If your doctor prescribed antibiotics, take them as directed. Do not stop taking them just because you feel better. You need to take the full course of antibiotics. · If you have stitches, do not remove them on your own. Your doctor will tell you when to come back to have them removed. · If you have Steri-Strips, leave them on until they fall off. · If possible, prop up the injured area on a pillow anytime you sit or lie down during the next 3 days. Try to keep it above the level of your heart. This will help reduce swelling. When should you call for help? Call your doctor now or seek immediate medical care if:  · You have new pain, or the pain gets worse. · The skin near the wound is cold or pale or changes color. · You have tingling, weakness, or numbness near the wound. · The wound starts to bleed, and blood soaks through the bandage. Oozing small amounts of blood is normal.  · You have symptoms of infection, such as:  ? Increased pain, swelling, warmth, or redness.   ? Red streaks leading from the wound.  ? Pus draining from the wound. ? A fever. Watch closely for changes in your health, and be sure to contact your doctor if:  · You do not get better as expected. Where can you learn more? Go to http://glenn-mayra.info/  Enter P342 in the search box to learn more about \"Wound Check: Care Instructions. \"  Current as of: June 26, 2019               Content Version: 12.5  © 9906-3583 [employer]. Care instructions adapted under license by [employer] (which disclaims liability or warranty for this information). If you have questions about a medical condition or this instruction, always ask your healthcare professional. [address] any warranty or liability for your use of this information.

## 2020-07-17 NOTE — ED PROVIDER NOTES
EMERGENCY DEPARTMENT HISTORY AND PHYSICAL EXAM    Date: 7/17/2020  Patient Name: Cristela Lucero    History of Presenting Illness     Chief Complaint   Patient presents with    Laceration         History Provided By: Patient    Cristela Lucero is a 26 y.o. male who presents to the emergency department C/O head wound. Patient reports 3 days ago while working on a car went to stand up and hit his head on a piece of metal.  Patient states that he had no loss of consciousness but did sustain a wound to the vertex of his scalp. Patient states that he put new skin on it. Patient reports today not because of pain but inquiring about need for tetanus shot. Last tetanus is unknown. Pt denies loss of consciousness, headache, and any other sxs or complaints. PCP: None    Current Facility-Administered Medications   Medication Dose Route Frequency Provider Last Rate Last Dose    diph,Pertuss(AC),Tet Vac-PF (BOOSTRIX) suspension 0.5 mL  0.5 mL IntraMUSCular PRIOR TO DISCHARGE Chau Zendejas PA           Past History     Past Medical History:  Past Medical History:   Diagnosis Date    Asthma        Past Surgical History:  Past Surgical History:   Procedure Laterality Date    HX APPENDECTOMY         Family History:  History reviewed. No pertinent family history. Social History:  Social History     Tobacco Use    Smoking status: Never Smoker    Smokeless tobacco: Never Used   Substance Use Topics    Alcohol use: Yes     Comment: socially     Drug use: Yes     Types: Marijuana       Allergies:  No Known Allergies      Review of Systems   Review of Systems   Skin: Positive for wound. Neurological: Negative for dizziness, syncope, weakness and headaches. All other systems reviewed and are negative.       Physical Exam     Vitals:    07/17/20 1915   BP: 142/83   Pulse: 83   Resp: 16   Temp: 98 °F (36.7 °C)   SpO2: 99%   Weight: 90.7 kg (200 lb)   Height: 5' 11\" (1.803 m)     Physical Exam  Vitals signs and nursing note reviewed. Constitutional:       General: He is not in acute distress. Appearance: Normal appearance. He is normal weight. He is not ill-appearing. HENT:      Head: Normocephalic. Comments: 2 cm linear wound noted to the vertex of the scalp without swelling bruising or tender to palp, there is a yellow hard substance noted on the wound which patient reports is new skin no signs of infection  Eyes:      Extraocular Movements: Extraocular movements intact. Conjunctiva/sclera: Conjunctivae normal.      Pupils: Pupils are equal, round, and reactive to light. Neck:      Musculoskeletal: Normal range of motion and neck supple. Musculoskeletal: Normal range of motion. Skin:     General: Skin is warm and dry. Capillary Refill: Capillary refill takes less than 2 seconds. Neurological:      General: No focal deficit present. Mental Status: He is alert. Psychiatric:         Mood and Affect: Mood normal.         Behavior: Behavior normal.           Diagnostic Study Results     Labs -   No results found for this or any previous visit (from the past 12 hour(s)). Radiologic Studies -   No orders to display     CT Results  (Last 48 hours)    None        CXR Results  (Last 48 hours)    None          Medications given in the ED-  Medications   diph,Pertuss(AC),Tet Vac-PF (BOOSTRIX) suspension 0.5 mL (has no administration in time range)         Medical Decision Making   I am the first provider for this patient. I reviewed the vital signs, available nursing notes, past medical history, past surgical history, family history and social history. Vital Signs-Reviewed the patient's vital signs. Records Reviewed: Nursing Notes    Procedures:  Procedures    ED Course:   7:39 PM   Initial assessment performed. The patients presenting problems have been discussed, and they are in agreement with the care plan formulated and outlined with them.   I have encouraged them to ask questions as they arise throughout their visit. Discussion: 26 y.o. male ambulatory to the ER 3 days status post head injury without LOC, a scalp wound that he put new skin on and needing a tetanus shot. He is neurovascular intact with appropriate vital signs. Scalp Wound (3cm linear) has a hard substance on it but there is no signs of infection and is nontender. Tetanus updated. Return precautions discussed. Diagnosis and Disposition       DISCHARGE NOTE:  Yvonne Diaz  results have been reviewed with him. He has been counseled regarding his diagnosis, treatment, and plan. He verbally conveys understanding and agreement of the signs, symptoms, diagnosis, treatment and prognosis and additionally agrees to follow up as discussed. He also agrees with the care-plan and conveys that all of his questions have been answered. I have also provided discharge instructions for him that include: educational information regarding their diagnosis and treatment, and list of reasons why they would want to return to the ED prior to their follow-up appointment, should his condition change. He has been provided with education for proper emergency department utilization. CLINICAL IMPRESSION:    1. Closed head injury, initial encounter    2. Open wound of scalp, unspecified open wound type, initial encounter    3. Need for tetanus booster        PLAN:  1. D/C Home  2. There are no discharge medications for this patient.     3.   Follow-up Information     Follow up With Specialties Details Why Contact Info    your PCP  Schedule an appointment as soon as possible for a visit      [facility]   for primary care follow up [id number] [facility], [address]    [facility] EMERGENCY DEPT Emergency Medicine  As needed, If symptoms worsen [address] [place] [address]  [phone]                  Please note that this dictation was completed with reina Cunningham computer voice recognition software. Quite often unanticipated grammatical, syntax, homophones, and other interpretive errors are inadvertently transcribed by the computer software. Please disregard these errors. Please excuse any errors that have escaped final proofreading.

## 2020-07-17 NOTE — ED TRIAGE NOTES
Patient ambulatory to ED with c/o a laceration to the top of the head. Patient states he was working under his car 2 days ago and hit his head on the way up. Patient states his aunt put \"new skin\" on the lac. No bleeding or redness noted on inspection. Patient states he wants to make sure it isn't infected and to possible get a tetanus shot.

## 2020-07-31 ENCOUNTER — HOSPITAL ENCOUNTER (EMERGENCY)
Age: 30
Discharge: HOME | End: 2020-07-31
Attending: EMERGENCY MEDICINE
Payer: COMMERCIAL

## 2020-07-31 VITALS
DIASTOLIC BLOOD PRESSURE: 53 MMHG | SYSTOLIC BLOOD PRESSURE: 121 MMHG | BODY MASS INDEX: 30.8 KG/M2 | TEMPERATURE: 98.7 F | HEART RATE: 61 BPM | WEIGHT: 220 LBS | HEIGHT: 71 IN | OXYGEN SATURATION: 100 % | RESPIRATION RATE: 14 BRPM

## 2020-07-31 DIAGNOSIS — Z76.89 RETURN TO WORK EVALUATION: Primary | ICD-10-CM

## 2020-07-31 PROCEDURE — 99282 EMERGENCY DEPT VISIT SF MDM: CPT

## 2020-07-31 NOTE — ED PROVIDER NOTES
EMERGENCY DEPARTMENT HISTORY AND PHYSICAL EXAM    Date: 7/31/2020  Patient Name: Vipul Cardenas    History of Presenting Illness     Chief Complaint   Patient presents with    Check Up    Letter for School/Work         History Provided By: Patient    9:15 AM  Vipul Cardenas is a 26 y.o. male with PMHX of head injury 2 weeks ago who presents to the emergency department C/O needs note to return to work. Per patient he had a head injury 2 weeks ago and had a headache as a result. His work put him on Covid-19 leave for 2 weeks due to the headache. He states he has not had any symptoms since then including no fever, cough, shortness of breath, chest pain, headache, other complaints. He states he went to return to work and was told he needed a note from a physician to be able to return. PCP: None        Past History     Past Medical History:  Past Medical History:   Diagnosis Date    Asthma        Past Surgical History:  Past Surgical History:   Procedure Laterality Date    HX APPENDECTOMY         Family History:  No family history on file. Social History:  Social History     Tobacco Use    Smoking status: Never Smoker    Smokeless tobacco: Never Used   Substance Use Topics    Alcohol use: Yes     Comment: socially     Drug use: Yes     Types: Marijuana       Allergies:  No Known Allergies      Review of Systems   Review of Systems   Constitutional: Negative for fever. Respiratory: Negative for shortness of breath. Cardiovascular: Negative for chest pain. Gastrointestinal: Negative for abdominal pain. All other systems reviewed and are negative.         Physical Exam     Vitals:    07/31/20 0859   BP: 121/53   Pulse: 61   Resp: 14   Temp: 98.7 °F (37.1 °C)   SpO2: 100%   Weight: 99.8 kg (220 lb)   Height: 5' 11\" (1.803 m)     Physical Exam    Nursing notes and vital signs reviewed    Constitutional: Non toxic appearing, no acute distress  Head: Normocephalic, Atraumatic  Eyes: EOMI  Neck: Supple  Chest: Normal work of breathing and chest excursion bilaterally  Back: No evidence of trauma or deformity  Extremities: No evidence of trauma or deformity  Skin: Warm and dry, normal cap refill  Neuro: Alert and appropriate  Psychiatric: Normal mood and affect      Diagnostic Study Results     Labs -   No results found for this or any previous visit (from the past 12 hour(s)). Radiologic Studies -   No orders to display     CT Results  (Last 48 hours)    None        CXR Results  (Last 48 hours)    None          Medications given in the ED-  Medications - No data to display      Medical Decision Making   I am the first provider for this patient. I reviewed the vital signs, available nursing notes, past medical history, past surgical history, family history and social history. Vital Signs-Reviewed the patient's vital signs. Pulse Oximetry Analysis - 100% on room air, not hypoxic    Records Reviewed: Nursing Notes    Provider Notes (Medical Decision Making): Cristela Lucero is a 26 y.o. male presenting requesting a note to return to work. Patient was put on Covid-19 leave by his workplace. He has not had any Covid-19 symptoms for the past 2 weeks. Patient is cleared by CDC guidelines to return to work. Patient is hemodynamically stable with no respiratory distress and not hypoxic on room air. Note provided to return to work and patient discharged with return precautions. Procedures:  Procedures    ED Course:       Diagnosis and Disposition     Critical Care: None    DISCHARGE NOTE:    Jen Arriaga  results have been reviewed with him. He has been counseled regarding his diagnosis, treatment, and plan. He verbally conveys understanding and agreement of the signs, symptoms, diagnosis, treatment and prognosis and additionally agrees to follow up as discussed. He also agrees with the care-plan and conveys that all of his questions have been answered.   I have also provided discharge instructions for him that include: educational information regarding their diagnosis and treatment, and list of reasons why they would want to return to the ED prior to their follow-up appointment, should his condition change. He has been provided with education for proper emergency department utilization. CLINICAL IMPRESSION:    1. Return to work evaluation        PLAN:  1. D/C Home  2. There are no discharge medications for this patient. 3.   Follow-up Information     Follow up With Specialties Details Why Contact Info    Solange Gregory, DO Internal Medicine Schedule an appointment as soon as possible for a visit Or Your Primary Care Doctor [address]      [facility] EMERGENCY DEPT Emergency Medicine  If symptoms worsen [address] [address]  [phone]        _______________________________      Please note that this dictation was completed with [employer], the computer voice recognition software. Quite often unanticipated grammatical, syntax, homophones, and other interpretive errors are inadvertently transcribed by the computer software. Please disregard these errors. Please excuse any errors that have escaped final proofreading.

## 2020-07-31 NOTE — LETTER
[facility] [place] 
[facility] EMERGENCY DEPT 
[address] [address] [phone] Work/School Note Date: 7/31/2020 To Whom It May concern: 
 
Hansel Duverney was seen and treated today in the emergency room by the following provider(s): 
Attending Provider: Almita Marks MD.   
 
Hansel Duverney may return to work on 7/31/2020.  
 
Sincerely, 
 
 
 
 
Eloisa West MD

## 2020-07-31 NOTE — DISCHARGE INSTRUCTIONS
Patient Education        Learning About COVID-19 and Social Distancing  What is it? Social distancing means putting space between yourself and other people. The recommended distance is 6 feet, or about 2 meters. This also means staying away from any place where people may gather, such as [place] or other public gathering places. Why is it important? Social distancing is the best way to reduce the spread of COVID-19. This virus seems to spread from person to person through droplets from coughing and sneezing. So if you keep your distance from others, you're less likely to get it or spread it. And social distancing is important for everyone, not just those who are at high risk of infection, like older people. You might have the virus but not have symptoms. You could then give the infection to someone you come into contact with. How is it done? Putting 6 feet, or about 2 meters, between you and other people is the recommended distance. Also stay away from any place where people may gather, such as [place] or other public gathering places. So if possible:  · Work from home, and keep your kids at home. · Don't travel if you don't have to. And avoid public transportation, ride-shares, and taxis unless you have no choice. · Limit shopping to essentials, like food and medicines. · Wear a cloth face cover if you have to go to a public place like the grocery store or pharmacy. · Don't eat in restaurants. (You can still get takeout or food deliveries.)  · Avoid crowds and busy places. Follow stay-at-home orders or other directions for your area. Current as of: May 8, 2020               Content Version: 12.5  © 7234-3079 Healthwise, Incorporated. Care instructions adapted under license by [employer] (which disclaims liability or warranty for this information).  If you have questions about a medical condition or this instruction, always ask your healthcare professional. Marv Goodman any warranty or liability for your use of this information.

## 2020-08-13 ENCOUNTER — HOSPITAL ENCOUNTER (EMERGENCY)
Age: 30
Discharge: HOME | End: 2020-08-13
Attending: EMERGENCY MEDICINE
Payer: COMMERCIAL

## 2020-08-13 VITALS
DIASTOLIC BLOOD PRESSURE: 63 MMHG | HEART RATE: 75 BPM | BODY MASS INDEX: 30.8 KG/M2 | SYSTOLIC BLOOD PRESSURE: 123 MMHG | HEIGHT: 71 IN | OXYGEN SATURATION: 100 % | RESPIRATION RATE: 20 BRPM | TEMPERATURE: 97.7 F | WEIGHT: 220 LBS

## 2020-08-13 DIAGNOSIS — B02.39 SHINGLES OF EYELID: Primary | ICD-10-CM

## 2020-08-13 PROCEDURE — 99283 EMERGENCY DEPT VISIT LOW MDM: CPT

## 2020-08-13 PROCEDURE — 74011000250 HC RX REV CODE- 250: Performed by: EMERGENCY MEDICINE

## 2020-08-13 RX ORDER — VALACYCLOVIR HYDROCHLORIDE 1 G/1
1000 TABLET, FILM COATED ORAL 3 TIMES DAILY
Qty: 21 TAB | Refills: 0 | Status: SHIPPED | OUTPATIENT
Start: 2020-08-13 | End: 2020-08-20

## 2020-08-13 RX ORDER — PROPARACAINE HYDROCHLORIDE 5 MG/ML
1 SOLUTION/ DROPS OPHTHALMIC
Status: COMPLETED | OUTPATIENT
Start: 2020-08-13 | End: 2020-08-13

## 2020-08-13 RX ADMIN — FLUORESCEIN SODIUM 1 STRIP: 1 STRIP OPHTHALMIC at 09:34

## 2020-08-13 RX ADMIN — PROPARACAINE HYDROCHLORIDE 1 DROP: 5 SOLUTION/ DROPS OPHTHALMIC at 09:34

## 2020-08-13 NOTE — ED PROVIDER NOTES
EMERGENCY DEPARTMENT HISTORY AND PHYSICAL EXAM    Date: 8/13/2020  Patient Name: Lola Owens    History of Presenting Illness     Chief Complaint   Patient presents with    Eye Pain         History Provided By: Patient    Chichi Soni is a 26 y.o. male with PMHX of asthma who presents to the emergency department C/O of eye lesion. Patient tells me that a couple days ago he was working and he rubbed his face with a dirty shirt and dirt got around his eye and irritated it. States lesions under his eye that have gotten worse over the past 2 days. Denies any eye pain or change in vision. PCP: None    Current Facility-Administered Medications   Medication Dose Route Frequency Provider Last Rate Last Dose    fluorescein (FUL-LOREN) 1 mg ophthalmic strip 1 Strip  1 Strip Both Eyes NOW Felipe Sawyer MD        proparacaine (OPTHAINE) 0.5 % ophthalmic solution 1 Drop  1 Drop Both Eyes NOW Rose Fallon MD         Current Outpatient Medications   Medication Sig Dispense Refill    valACYclovir (VALTREX) 1 gram tablet Take 1 Tab by mouth three (3) times daily for 7 days. 21 Tab 0       Past History     Past Medical History:  Past Medical History:   Diagnosis Date    Asthma        Past Surgical History:  Past Surgical History:   Procedure Laterality Date    HX APPENDECTOMY         Family History:  History reviewed. No pertinent family history. Social History:  Social History     Tobacco Use    Smoking status: Never Smoker    Smokeless tobacco: Never Used   Substance Use Topics    Alcohol use: Yes     Comment: socially     Drug use: Yes     Types: Marijuana       Allergies:  No Known Allergies      Review of Systems   Review of Systems   Constitutional: Negative for chills and fever. Gastrointestinal: Negative for nausea and vomiting. Skin: Positive for rash. All other systems reviewed and are negative.         Physical Exam     Vitals:    08/13/20 0912   BP: 123/63   Pulse: 75 Resp: 20   Temp: 97.7 °F (36.5 °C)   SpO2: 100%   Weight: 99.8 kg (220 lb)   Height: 5' 11\" (1.803 m)     Physical Exam  Vitals signs and nursing note reviewed. Constitutional:       General: He is not in acute distress. Appearance: Normal appearance. HENT:      Head: Normocephalic and atraumatic. Comments: Small grouping of vesicles just under lateral aspect of left thigh  Eyes:      Extraocular Movements: Extraocular movements intact. Conjunctiva/sclera: Conjunctivae normal.   Neck:      Musculoskeletal: Normal range of motion. Cardiovascular:      Rate and Rhythm: Normal rate and regular rhythm. Pulmonary:      Effort: Pulmonary effort is normal. No respiratory distress. Musculoskeletal: Normal range of motion. General: No deformity. Neurological:      General: No focal deficit present. Mental Status: He is alert and oriented to person, place, and time. Mental status is at baseline. Psychiatric:         Mood and Affect: Mood normal.         Behavior: Behavior normal.             Diagnostic Study Results     Labs -   No results found for this or any previous visit (from the past 12 hour(s)). Radiologic Studies -   No orders to display     CT Results  (Last 48 hours)    None        CXR Results  (Last 48 hours)    None          Medications given in the ED-  Medications   fluorescein (FUL-LOREN) 1 mg ophthalmic strip 1 Strip (has no administration in time range)   proparacaine (OPTHAINE) 0.5 % ophthalmic solution 1 Drop (has no administration in time range)         Medical Decision Making   I am the first provider for this patient. I reviewed the vital signs, available nursing notes, past medical history, past surgical history, family history and social history. Vital Signs-Reviewed the patient's vital signs.     Pulse Oximetry Analysis and Interpretation:   100% on RA, nml      Records Reviewed: Nursing Notes and Old Medical Records    Provider Notes (Medical Decision Making): Radha Montez is a 26 y.o. male here with left periorbital lesions. Appears to be small vesicles and herpetiform  No corneal involvement. No tip of nose involvement. No ear involvement. Patient tells me he is never had chickenpox before and he is positive of this but given appears to be varicella will cover with antivirals given possibility of complications. Procedures:  Eye Stain      Date/Time: 8/13/2020 9:59 AM    Performed by: attending        Corneal abrasion was not present on eyelid eversion. Cornea is clear. Anterior chamber is clear. Patient tolerance: Patient tolerated the procedure well with no immediate complications  My total time at bedside, performing this procedure was 1-15 minutes. ED Course:       Diagnosis and Disposition     Critical Care:     DISCHARGE NOTE:    Frannie Del Castillo  results have been reviewed with him. He has been counseled regarding his diagnosis, treatment, and plan. He verbally conveys understanding and agreement of the signs, symptoms, diagnosis, treatment and prognosis and additionally agrees to follow up as discussed. He also agrees with the care-plan and conveys that all of his questions have been answered. I have also provided discharge instructions for him that include: educational information regarding their diagnosis and treatment, and list of reasons why they would want to return to the ED prior to their follow-up appointment, should his condition change. He has been provided with education for proper emergency department utilization. CLINICAL IMPRESSION:    1. Shingles of eyelid        PLAN:  1. D/C Home  2. Current Discharge Medication List      START taking these medications    Details   valACYclovir (VALTREX) 1 gram tablet Take 1 Tab by mouth three (3) times daily for 7 days. Qty: 21 Tab, Refills: 0           3.    Follow-up Information     Follow up With Specialties Details Why Contact Info    Your primary care doctor  Schedule an appointment as soon as possible for a visit  For primary care follow up         _______________________________      Please note that this dictation was completed with [employer], the computer voice recognition software. Quite often unanticipated grammatical, syntax, homophones, and other interpretive errors are inadvertently transcribed by the computer software. Please disregard these errors. Please excuse any errors that have escaped final proofreading.

## 2020-08-13 NOTE — ED NOTES
This RN went into discharge papers to patient. Patient began speaking aggressively with RN and voiced displeasure with MD.  Rn offered opportunity to answer concerns and questions. Patient states at that time the doctor was and \"arrogant son of a bitch\" Patient then stated that he would go and be seen somewhere else.

## 2020-08-13 NOTE — Clinical Note
Danni Mery was seen and treated in our emergency department on 8/13/2020. He may return to work on .

## 2020-08-13 NOTE — ED NOTES
11:03:  Patient called back yelling at this RN stating that he wanted his prescription changed. Patient was yelling at this RN calling staff, \"a bunch of stupid assholes. \"  Patient states that the DrEstefani Was a \"white mother fucker\" and he wanted a paper prescription. Patient was told his prescription was sent electronically to the pharmacy he identified at check in and was verified at discharge. Patient became visibly upset and stated he was calling to complain. MD aware.

## 2020-08-13 NOTE — Clinical Note
Nay Avalossteff was seen and treated in our emergency department on 8/13/2020. He may return to work on .

## 2020-08-13 NOTE — LETTER
NOTIFICATION RETURN TO WORK / SCHOOL 
 
8/13/2020 9:58 AM 
 
[facility] [address] [place] [phone] To Whom It May Concern: 
 
Vipul Cardenas is currently under the care of [facility] EMERGENCY DEPT. He will return to work/school on: 8/17 If there are questions or concerns please have the patient contact our office.  
 
 
 
Sincerely, 
 
 
Arcelia Reeder MD

## 2020-10-12 ENCOUNTER — HOSPITAL ENCOUNTER (EMERGENCY)
Age: 30
Discharge: HOME | End: 2020-10-12
Attending: EMERGENCY MEDICINE
Payer: COMMERCIAL

## 2020-10-12 ENCOUNTER — APPOINTMENT (OUTPATIENT)
Dept: GENERAL RADIOLOGY | Age: 30
End: 2020-10-12
Attending: PHYSICIAN ASSISTANT
Payer: COMMERCIAL

## 2020-10-12 VITALS
HEIGHT: 71 IN | WEIGHT: 221.34 LBS | HEART RATE: 83 BPM | BODY MASS INDEX: 30.99 KG/M2 | TEMPERATURE: 97.5 F | RESPIRATION RATE: 20 BRPM | SYSTOLIC BLOOD PRESSURE: 123 MMHG | DIASTOLIC BLOOD PRESSURE: 72 MMHG | OXYGEN SATURATION: 100 %

## 2020-10-12 DIAGNOSIS — Z20.822 PERSON UNDER INVESTIGATION FOR COVID-19: Primary | ICD-10-CM

## 2020-10-12 DIAGNOSIS — J18.9 COMMUNITY ACQUIRED PNEUMONIA OF LEFT LOWER LOBE OF LUNG: ICD-10-CM

## 2020-10-12 LAB

## 2020-10-12 PROCEDURE — 87635 SARS-COV-2 COVID-19 AMP PRB: CPT

## 2020-10-12 PROCEDURE — 99282 EMERGENCY DEPT VISIT SF MDM: CPT

## 2020-10-12 PROCEDURE — 71045 X-RAY EXAM CHEST 1 VIEW: CPT

## 2020-10-12 PROCEDURE — 0100U RESPIRATORY PANEL,PCR,NASOPHARYNGEAL: CPT

## 2020-10-12 RX ORDER — AZITHROMYCIN 250 MG/1
TABLET, FILM COATED ORAL
Qty: 6 TAB | Refills: 0 | Status: SHIPPED | OUTPATIENT
Start: 2020-10-12 | End: 2020-10-17

## 2020-10-12 RX ORDER — ALBUTEROL SULFATE 90 UG/1
2 AEROSOL, METERED RESPIRATORY (INHALATION)
Qty: 1 INHALER | Refills: 1 | Status: SHIPPED | OUTPATIENT
Start: 2020-10-12 | End: 2021-04-03 | Stop reason: SDUPTHER

## 2020-10-12 NOTE — ED PROVIDER NOTES
EMERGENCY DEPARTMENT HISTORY AND PHYSICAL EXAM    Date: 10/12/2020  Patient Name: Nola Dalton    History of Presenting Illness     Chief Complaint   Patient presents with    Fever    Generalized Body Aches         History Provided By: Patient    Chief Complaint: cough    HPI(Context):   3:28 PM  Nola Dalton is a 26 y.o. male with PMHX of asthma who presents to the emergency department C/O cough. Associated sxs include headache, chills, myalgias, and congestion. Sxs x 2 days. No at home tx. Pt endorses exposure to COVID. Pt denies vomiting, sore throat, tobacco use, and any other sxs or complaints. PCP: None        Past History     Past Medical History:  Past Medical History:   Diagnosis Date    Asthma        Past Surgical History:  Past Surgical History:   Procedure Laterality Date    HX APPENDECTOMY         Family History:  History reviewed. No pertinent family history. Social History:  Social History     Tobacco Use    Smoking status: Never Smoker    Smokeless tobacco: Never Used   Substance Use Topics    Alcohol use: Yes     Comment: socially     Drug use: Yes     Types: Marijuana       Allergies:  No Known Allergies      Review of Systems   Review of Systems   Constitutional: Positive for chills and fever. HENT: Positive for congestion. Negative for sore throat. Respiratory: Positive for cough. Negative for shortness of breath. Cardiovascular: Negative for chest pain. Gastrointestinal: Positive for nausea. Musculoskeletal: Positive for myalgias. Neurological: Positive for headaches. All other systems reviewed and are negative. Physical Exam     Vitals:    10/12/20 1525   BP: 123/72   Pulse: 83   Resp: 20   Temp: 97.5 °F (36.4 °C)   SpO2: 100%   Weight: 100.4 kg (221 lb 5.5 oz)   Height: 5' 11\" (1.803 m)     Physical Exam  Vitals signs and nursing note reviewed. Constitutional:       General: He is not in acute distress. Appearance: He is well-developed.  He is not diaphoretic. Comments: AA male in NAD. Alert. HENT:      Head: Normocephalic and atraumatic. Right Ear: External ear normal.      Left Ear: External ear normal.      Nose: Nose normal.   Eyes:      Conjunctiva/sclera: Conjunctivae normal.   Neck:      Musculoskeletal: Normal range of motion. Cardiovascular:      Rate and Rhythm: Normal rate and regular rhythm. Heart sounds: Normal heart sounds. No murmur. No friction rub. No gallop. Pulmonary:      Effort: Pulmonary effort is normal. No tachypnea, accessory muscle usage or respiratory distress. Breath sounds: Normal breath sounds. No decreased breath sounds, wheezing, rhonchi or rales. Abdominal:      General: There is no distension. Palpations: Abdomen is soft. Tenderness: There is no abdominal tenderness. Musculoskeletal: Normal range of motion. Right lower leg: No edema. Left lower leg: No edema. Skin:     General: Skin is warm and dry. Neurological:      Mental Status: He is alert and oriented to person, place, and time. Mental status is at baseline. Psychiatric:         Judgment: Judgment normal.             Diagnostic Study Results     Labs -   No results found for this or any previous visit (from the past 12 hour(s)). XR CHEST PORT   Final Result   IMPRESSION:      Left costophrenic angle opacity possibly infiltrate versus atelectasis. CT Results  (Last 48 hours)    None        CXR Results  (Last 48 hours)               10/12/20 1552  XR CHEST PORT Final result    Impression:  IMPRESSION:       Left costophrenic angle opacity possibly infiltrate versus atelectasis. Narrative:  EXAM: XR CHEST PORT       CLINICAL INDICATION/HISTORY: cough   -Additional: None       COMPARISON: 9/8/2017       TECHNIQUE: Portable frontal view of the chest       _______________       FINDINGS:       SUPPORT DEVICES: None. HEART AND MEDIASTINUM: Cardiomediastinal silhouette within normal limits. LUNGS AND PLEURAL SPACES: Left costophrenic angle opacity possibly infiltrate   versus atelectasis. No large effusion or pneumothorax.       _______________                 Medications given in the ED-  Medications - No data to display      Medical Decision Making   I am the first provider for this patient. I reviewed the vital signs, available nursing notes, past medical history, past surgical history, family history and social history. Vital Signs-Reviewed the patient's vital signs. Pulse Oximetry Analysis - 100% on RA. NORMAL     Records Reviewed: Nursing Notes    Provider Notes (Medical Decision Making): URI, sinusitis, influenza, PNA, bronchitis, asthma/RAD, allergic, COVID    Procedures:  Procedures    ED Course:   3:28 PM Initial assessment performed. The patients presenting problems have been discussed, and they are in agreement with the care plan formulated and outlined with them. I have encouraged them to ask questions as they arise throughout their visit. Diagnosis and Disposition       Afebrile. Will tx for possible infiltrate seen on CXR today. No resp distress or hypoxia. Will await SARS-COV-2 testing. Isolation. Work note provided. Reasons to RTED discussed with pt. All questions answered. Pt feels comfortable going home at this time. Pt expressed understanding and he agrees with plan. The COVID-19 pandemic has been declared a Public Health Emergency. I have explained to the patient the importance of home isolation and strict return precautions. 1. Person under investigation for COVID-19    2. Community acquired pneumonia of left lower lobe of lung        PLAN:  1. D/C Home  2. Current Discharge Medication List      START taking these medications    Details   albuterol (PROVENTIL HFA, VENTOLIN HFA, PROAIR HFA) 90 mcg/actuation inhaler Take 2 Puffs by inhalation every four (4) hours as needed for Wheezing or Shortness of Breath.   Qty: 1 Inhaler, Refills: 1      azithromycin (Zithromax Z-Marc) 250 mg tablet Take 2 pills on day 1 and 1 pill on days 2-5  Qty: 6 Tab, Refills: 0           3. Follow-up Information     Follow up With Specialties Details Why Kali Davenport [address]    [address] [facility]    THE [facility] EMERGENCY DEPT Emergency Medicine  As needed, If symptoms worsen [address]  [address] [facility] [address]  [phone]        _______________________________    Attestations: This note is prepared by Cyndie Britton PA-C.  _______________________________        Please note that this dictation was completed with [employer], the computer voice recognition software. Quite often unanticipated grammatical, syntax, homophones, and other interpretive errors are inadvertently transcribed by the computer software. Please disregard these errors. Please excuse any errors that have escaped final proofreading.

## 2020-10-12 NOTE — LETTER
[facility] [place] 
THE [facility] EMERGENCY DEPT 
[address] [address] [phone] Work/School Note Date: 10/12/2020 To Whom It May concern: 
 
Jayant Dueñas was seen and treated today in the emergency room by the following provider(s): 
Attending Provider: Yanet Baker MD 
Physician Assistant: Zulay Reich PA-C. Jayant Dueñas may return to work on 10/21/2020. Sincerely, Mikaela Castro PA-C

## 2020-10-13 ENCOUNTER — PATIENT OUTREACH (OUTPATIENT)
Dept: CASE MANAGEMENT | Age: 30
End: 2020-10-13

## 2020-10-13 LAB — SARS-COV-2, COV2NT: NOT DETECTED

## 2020-10-13 NOTE — PROGRESS NOTES
COVID-19 test pending at time of this note. Patient contacted regarding COVID-19  risk. Care Transition Nurse/ Ambulatory Care Manager contacted the patient by telephone to perform post discharge assessment. Verified name and  with patient as identifiers. Provided introduction to self, and explanation of the CTN/ACM role, and reason for call due to risk factors for infection and/or exposure to COVID-19. Symptoms reviewed with patient who verbalized the following symptoms: no new symptoms and no worsening symptoms. Due to no new or worsening symptoms encounter was not routed to provider for escalation. Patient has following risk factors of: asthma. CTN/ACM reviewed discharge instructions, medical action plan and red flags such as increased shortness of breath, increasing fever and signs of decompensation with patient who verbalized understanding. Discussed exposure protocols and quarantine with CDC Guidelines What to do if you are sick with coronavirus disease .  Patient was given an opportunity for questions and concerns. The patient agrees to contact the [facility] exposure line [phone], [facility]  ([phone]) and PCP office for questions related to their healthcare. CTN/ACM provided contact information for future needs. Reviewed and educated patient on any new and changed medications related to discharge diagnosis. Patient/family/caregiver given information for [employer] and agrees to enroll no    Patient's preferred e-mail:    Patient's preferred phone number:     Based on Loop alert triggers, patient will be contacted by nurse care manager for worsening symptoms. Plan for follow-up call in 5-7 days based on severity of symptoms and risk factors.

## 2020-10-20 ENCOUNTER — PATIENT OUTREACH (OUTPATIENT)
Dept: CASE MANAGEMENT | Age: 30
End: 2020-10-20

## 2020-10-20 NOTE — PROGRESS NOTES
Appointment request made via spread sheet. COVID-19 Screening Follow-up Note    Patient contacted regarding COVID-19 risk and screening. Care Transition Nurse/ Ambulatory Care Manager contacted the patient by telephone to perform follow-up assessment. Verified name and  with patient as identifiers. Patient has following risk factors of: asthma and pneumonia. Symptoms reviewed with patient who verbalized the following symptoms: no new symptoms and no worsening symptoms. Due to no new or worsening symptoms encounter was not routed to provider for escalation. Education provided regarding infection prevention, and signs and symptoms of COVID-19 and when to seek medical attention with patient who verbalized understanding. Discussed exposure protocols and quarantine from [address] you at higher risk for severe illness  and given an opportunity for questions and concerns. The patient agrees to contact the COVID-19 hotline [phone] or PCP office for questions related to their healthcare. CTN/ACM provided contact information for future reference. From CDC: Are you at higher risk for severe illness?  Wash your hands often.  Avoid close contact (6 feet, which is about two arm lengths) with people who are sick.  Put distance between yourself and other people if COVID-19 is spreading in your community.  Clean and disinfect frequently touched surfaces.  Avoid all cruise travel and non-essential air travel.  Call your healthcare professional if you have concerns about COVID-19 and your underlying condition or if you are sick. For more information on steps you can take to protect yourself, see CDC's How to [place] for follow-up call in 7-14 days based on severity of symptoms and risk factors.

## 2020-10-27 ENCOUNTER — PATIENT OUTREACH (OUTPATIENT)
Dept: CASE MANAGEMENT | Age: 30
End: 2020-10-27

## 2020-10-27 NOTE — PROGRESS NOTES
Patient resolved from Transition of Care episode on  10/27/2020  Patient/family has been provided the following resources and education related to COVID-19:                         Signs, symptoms and red flags related to COVID-19            CDC exposure and quarantine guidelines            [facility] exposure contact - [phone]            Contact for their local Department of Health                 Patient currently reports that the following symptoms have improved:  no new symptoms and no worsening symptoms. No further outreach scheduled with this CTN/ACM. Episode of Care resolved. Patient has this CTN/ACM contact information if future needs arise.

## 2021-02-27 ENCOUNTER — HOSPITAL ENCOUNTER (EMERGENCY)
Age: 31
Discharge: HOME | End: 2021-02-27
Attending: EMERGENCY MEDICINE

## 2021-02-27 VITALS
BODY MASS INDEX: 36.4 KG/M2 | OXYGEN SATURATION: 100 % | HEART RATE: 74 BPM | RESPIRATION RATE: 20 BRPM | DIASTOLIC BLOOD PRESSURE: 69 MMHG | HEIGHT: 71 IN | WEIGHT: 260 LBS | TEMPERATURE: 97.1 F | SYSTOLIC BLOOD PRESSURE: 124 MMHG
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
## 2021-02-27 DIAGNOSIS — S80.811A ABRASION OF RIGHT LOWER EXTREMITY, INITIAL ENCOUNTER: ICD-10-CM

## 2021-02-27 DIAGNOSIS — Z76.89 RETURN TO WORK EVALUATION: Primary | ICD-10-CM

## 2021-02-27 DIAGNOSIS — S16.1XXA STRAIN OF NECK MUSCLE, INITIAL ENCOUNTER: ICD-10-CM

## 2021-02-27 PROCEDURE — 99281 EMR DPT VST MAYX REQ PHY/QHP: CPT
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
## 2021-02-27 NOTE — LETTER
[facility] [place] 
THE [facility] EMERGENCY DEPT 
[address] [address] [phone] Work/School Note Date: 2/27/2021 To Whom It May concern: 
 
Dary Ahumada was seen and treated today in the emergency room by the following provider(s): 
Attending Provider: Yaritza Roberts MD 
Physician Assistant: Joann Chavez, [address] [place] [address] may return to work on 2/28/21, without restriction.  
 
Sincerely, 
 
 
 
 
TAYLOR Ramon
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
## 2021-04-03 ENCOUNTER — HOSPITAL ENCOUNTER (EMERGENCY)
Age: 31
Discharge: HOME | End: 2021-04-03
Attending: EMERGENCY MEDICINE

## 2021-04-03 VITALS
TEMPERATURE: 97.8 F | OXYGEN SATURATION: 100 % | HEART RATE: 68 BPM | BODY MASS INDEX: 33.6 KG/M2 | HEIGHT: 71 IN | RESPIRATION RATE: 16 BRPM | DIASTOLIC BLOOD PRESSURE: 66 MMHG | WEIGHT: 240 LBS | SYSTOLIC BLOOD PRESSURE: 152 MMHG

## 2021-04-03 DIAGNOSIS — Z20.822 PERSON UNDER INVESTIGATION FOR COVID-19: ICD-10-CM

## 2021-04-03 DIAGNOSIS — J06.9 VIRAL UPPER RESPIRATORY TRACT INFECTION: Primary | ICD-10-CM

## 2021-04-03 LAB — SARS-COV-2, COV2: NORMAL

## 2021-04-03 PROCEDURE — 99282 EMERGENCY DEPT VISIT SF MDM: CPT

## 2021-04-03 PROCEDURE — U0003 INFECTIOUS AGENT DETECTION BY NUCLEIC ACID (DNA OR RNA); SEVERE ACUTE RESPIRATORY SYNDROME CORONAVIRUS 2 (SARS-COV-2) (CORONAVIRUS DISEASE [COVID-19]), AMPLIFIED PROBE TECHNIQUE, MAKING USE OF HIGH THROUGHPUT TECHNOLOGIES AS DESCRIBED BY CMS-2020-01-R: HCPCS

## 2021-04-03 RX ORDER — ALBUTEROL SULFATE 90 UG/1
2 AEROSOL, METERED RESPIRATORY (INHALATION)
Qty: 1 INHALER | Refills: 1 | Status: SHIPPED | OUTPATIENT
Start: 2021-04-03 | End: 2021-10-17

## 2021-04-03 NOTE — ED PROVIDER NOTES
EMERGENCY DEPARTMENT HISTORY AND PHYSICAL EXAM    Date: 4/3/2021  Patient Name: Rocco Christopher    History of Presenting Illness     Chief Complaint   Patient presents with    Covid Testing    Fever         History Provided By: Patient    11:38 AM  Rocco Christopher is a 31 y.o. male with PMHX of asthma who presents to the emergency department C/O generalized body aches, cough, chest congestion and diarrhea which began 4 days ago. Patient states his girlfriend, and their friend who visited from [place] both recently tested positive for COVID-19. Patient has asthma but states it is well controlled and he currently denies any wheezing. Pt denies fever, vomiting, chest pain, shortness of breath, and any other sxs or complaints. PCP: None    Current Outpatient Medications   Medication Sig Dispense Refill    albuterol (PROVENTIL HFA, VENTOLIN HFA, PROAIR HFA) 90 mcg/actuation inhaler Take 2 Puffs by inhalation every four (4) hours as needed for Wheezing or Shortness of Breath. 1 Inhaler 1       Past History     Past Medical History:  Past Medical History:   Diagnosis Date    Asthma        Past Surgical History:  Past Surgical History:   Procedure Laterality Date    HX APPENDECTOMY         Family History:  History reviewed. No pertinent family history. Social History:  Social History     Tobacco Use    Smoking status: Never Smoker    Smokeless tobacco: Never Used   Substance Use Topics    Alcohol use: Not Currently     Comment: socially     Drug use: Yes     Types: Marijuana       Allergies:  No Known Allergies      Review of Systems   Review of Systems   Constitutional: Negative for fever. HENT: Positive for congestion. Respiratory: Positive for cough. Negative for shortness of breath and wheezing. Cardiovascular: Negative for chest pain. Gastrointestinal: Positive for diarrhea. Negative for vomiting. Musculoskeletal: Positive for myalgias.    All other systems reviewed and are negative. Physical Exam     Vitals:    04/03/21 1132   BP: (!) 152/66   Pulse: 68   Resp: 16   Temp: 97.8 °F (36.6 °C)   SpO2: 100%   Weight: 108.9 kg (240 lb)   Height: 5' 11\" (1.803 m)     Physical Exam  Vital signs and nursing notes reviewed. CONSTITUTIONAL: Alert. Well-appearing; well-nourished; in no apparent distress. HEAD: Normocephalic; atraumatic. EYES: Conjunctiva clear. ENT: TM's normal. External ear normal. Normal nose; no rhinorrhea. Normal pharynx. Moist mucus membranes. NECK: Supple; FROM without difficulty, non-tender; no cervical lymphadenopathy. CV: Normal S1, S2; no murmurs, rubs, or gallops. No chest wall tenderness. RESPIRATORY: Normal chest excursion with respiration; breath sounds clear and equal bilaterally; no wheezes, rhonchi, or rales. SKIN: Normal for age and race; warm; dry; good turgor; no apparent lesions or exudate. NEURO: A & O x3. PSYCH:  Mood and affect appropriate. Diagnostic Study Results     Labs -   No results found for this or any previous visit (from the past 12 hour(s)). Radiologic Studies -   No orders to display     CT Results  (Last 48 hours)    None        CXR Results  (Last 48 hours)    None          Medications given in the ED-  Medications - No data to display      Medical Decision Making   I am the first provider for this patient. I reviewed the vital signs, available nursing notes, past medical history, past surgical history, family history and social history. Vital Signs-Reviewed the patient's vital signs. Records Reviewed: Nursing Notes      Procedures:  Procedures    ED Course:  11:38 AM   Initial assessment performed. The patients presenting problems have been discussed, and they are in agreement with the care plan formulated and outlined with them. I have encouraged them to ask questions as they arise throughout their visit.            Provider Notes (Medical Decision Making): Julianna Louis is a 31 y.o. male with viral URI symptoms x4 days and positive COVID-19 exposure. Vitals are stable, not hypoxic. No signs of asthma exacerbation at this time, but will refill albuterol inhaler to use if needed. Suspect COVID-19 infection. Strict isolation per current CDC guidelines and return precautions discussed. Diagnosis and Disposition       DISCHARGE NOTE:    Alvina Land  results have been reviewed with him. He has been counseled regarding his diagnosis, treatment, and plan. He verbally conveys understanding and agreement of the signs, symptoms, diagnosis, treatment and prognosis and additionally agrees to follow up as discussed. He also agrees with the care-plan and conveys that all of his questions have been answered. I have also provided discharge instructions for him that include: educational information regarding their diagnosis and treatment, and list of reasons why they would want to return to the ED prior to their follow-up appointment, should his condition change. He has been provided with education for proper emergency department utilization. CLINICAL IMPRESSION:    1. Viral upper respiratory tract infection    2. Person under investigation for COVID-19        PLAN:  1. D/C Home  2. Current Discharge Medication List      CONTINUE these medications which have CHANGED    Details   albuterol (PROVENTIL HFA, VENTOLIN HFA, PROAIR HFA) 90 mcg/actuation inhaler Take 2 Puffs by inhalation every four (4) hours as needed for Wheezing or Shortness of Breath. Qty: 1 Inhaler, Refills: 1           3.    Follow-up Information     Follow up With Specialties Details Why [address]   As needed [place] [address]    THE [facility] EMERGENCY DEPT Emergency Medicine  As needed, If symptoms worsen [address] [address]  [phone]        _______________________________      Please note that this dictation was completed with Dragon, the computer voice recognition software. Quite often unanticipated grammatical, syntax, homophones, and other interpretive errors are inadvertently transcribed by the computer software. Please disregard these errors. Please excuse any errors that have escaped final proofreading.

## 2021-04-03 NOTE — LETTER
[facility] [place] 
[facility] 
[address] [address] [phone] Work/School Note Date: 4/3/2021 To Whom It May concern: 
 
Aleyda Vega was evaulated by the following provider(s): 
Attending Provider: Juanita Hatchet, DO Physician Assistant: TAYLOR Caceres.   COVID19 virus is suspected. Per the CDC guidelines we recommend home isolation until the following conditions are all met: 1. At least 10 days have passed since symptoms first appeared and 2. At least 24 hours have passed since last fever without the use of fever-reducing medications and 
3. Symptoms (e.g., cough, shortness of breath) have improved Sincerely, 
 
 
 
 
TAYLOR Xiong

## 2021-04-03 NOTE — ED TRIAGE NOTES
Patient reports his girl had covid and he wants to be tested.  States he is not feeling well has body aches

## 2021-04-04 ENCOUNTER — PATIENT OUTREACH (OUTPATIENT)
Dept: CASE MANAGEMENT | Age: 31
End: 2021-04-04

## 2021-04-04 NOTE — PROGRESS NOTES
Patient contacted regarding COVID-19 exposure. Discussed COVID-19 related testing which was pending at this time. Test results were pending. Patient informed of results, if available? n/a. Outreach made within 2 business days of discharge: Yes    LPN Care Coordinator contacted the patient by telephone to perform post discharge assessment. Verified name and  with patient as identifiers. Provided introduction to self, and explanation of LPN Care Coordinator role, and reason for call due to risk factors for infection and/or exposure to COVID-19. Symptoms reviewed with patient who verbalized the following symptoms: pain or aching joints. Denies SOB, chest pain, cough, chills, fever, wheezing, lightheadness/dizziness,HA, n/v/d at this time. Due to no new or worsening symptoms encounter was not routed to provider for escalation. Discussed follow-up appointments. If no appointment was previously scheduled, appointment scheduling offered: yes  [facility] follow up appointment(s): No future appointments. [facility] follow up appointment(s): n/a    Patient encouraged to make an appointment with PCP for f/up. Advised to return to ED if symptoms worsen or fail to improve. Patients acknowledges understanding. Advance Care Planning:   Does patient have an Advance Directive: currently not on file; education provided      Patient has following risk factors of: asthma, COVID-19 Positive Exposure . LPN reviewed discharge instructions, medical action plan and red flags such as increased shortness of breath, increasing fever and signs of decompensation with patient who verbalized understanding. Discussed exposure protocols and quarantine with CDC Guidelines What to do if you are sick with coronavirus disease .  Patient was given an opportunity for questions and concerns.  The patient agrees to contact the [facility] exposure line [phone], [facility] department [address]  ([phone]) and PCP office for questions related to their healthcare. LPN provided contact information for future needs. Reviewed and educated parent on any new and changed medications related to discharge diagnosis. Patient/family/caregiver given information for [employer] and agrees to enroll Patient Denies  Patient's preferred e-mail:  n/a  Patient's preferred phone number: [phone]   Based on Loop alert triggers, patient will be contacted by nurse care manager for worsening symptoms. Pt will be further monitored by COVID Loop Team based on severity of symptoms and risk factors.

## 2021-04-06 LAB — SARS-COV-2, COV2NT: NOT DETECTED

## 2021-10-17 ENCOUNTER — HOSPITAL ENCOUNTER (EMERGENCY)
Age: 31
Discharge: HOME | End: 2021-10-17
Attending: EMERGENCY MEDICINE
Payer: COMMERCIAL

## 2021-10-17 ENCOUNTER — APPOINTMENT (OUTPATIENT)
Dept: CT IMAGING | Age: 31
End: 2021-10-17
Attending: EMERGENCY MEDICINE
Payer: COMMERCIAL

## 2021-10-17 VITALS
RESPIRATION RATE: 16 BRPM | TEMPERATURE: 98.5 F | HEART RATE: 76 BPM | SYSTOLIC BLOOD PRESSURE: 111 MMHG | WEIGHT: 240 LBS | DIASTOLIC BLOOD PRESSURE: 72 MMHG | BODY MASS INDEX: 33.6 KG/M2 | HEIGHT: 71 IN | OXYGEN SATURATION: 100 %

## 2021-10-17 VITALS
HEART RATE: 68 BPM | TEMPERATURE: 97.9 F | WEIGHT: 240 LBS | RESPIRATION RATE: 18 BRPM | SYSTOLIC BLOOD PRESSURE: 123 MMHG | HEIGHT: 71 IN | BODY MASS INDEX: 33.6 KG/M2 | OXYGEN SATURATION: 100 % | DIASTOLIC BLOOD PRESSURE: 80 MMHG

## 2021-10-17 DIAGNOSIS — R55 SYNCOPE AND COLLAPSE: ICD-10-CM

## 2021-10-17 DIAGNOSIS — R42 DIZZINESS: Primary | ICD-10-CM

## 2021-10-17 DIAGNOSIS — R10.9 FLANK PAIN: Primary | ICD-10-CM

## 2021-10-17 DIAGNOSIS — S06.0X9A CONCUSSION WITH LOSS OF CONSCIOUSNESS, INITIAL ENCOUNTER: ICD-10-CM

## 2021-10-17 LAB
ALBUMIN SERPL-MCNC: 3.3 G/DL (ref 3.4–5)
ALBUMIN SERPL-MCNC: 3.6 G/DL (ref 3.4–5)
ALBUMIN/GLOB SERPL: 0.9 {RATIO} (ref 0.8–1.7)
ALBUMIN/GLOB SERPL: 0.9 {RATIO} (ref 0.8–1.7)
ALP SERPL-CCNC: 80 U/L (ref 45–117)
ALP SERPL-CCNC: 83 U/L (ref 45–117)
ALT SERPL-CCNC: 26 U/L (ref 16–61)
ALT SERPL-CCNC: 27 U/L (ref 16–61)
ANION GAP SERPL CALC-SCNC: 3 MMOL/L (ref 3–18)
ANION GAP SERPL CALC-SCNC: 3 MMOL/L (ref 3–18)
APPEARANCE UR: CLEAR
AST SERPL-CCNC: 19 U/L (ref 10–38)
AST SERPL-CCNC: 30 U/L (ref 10–38)
ATRIAL RATE: 54 BPM
ATRIAL RATE: 64 BPM
BACTERIA URNS QL MICRO: ABNORMAL /HPF
BASOPHILS # BLD: 0 K/UL (ref 0–0.1)
BASOPHILS # BLD: 0.1 K/UL (ref 0–0.1)
BASOPHILS NFR BLD: 0 % (ref 0–2)
BASOPHILS NFR BLD: 1 % (ref 0–2)
BILIRUB SERPL-MCNC: 0.2 MG/DL (ref 0.2–1)
BILIRUB SERPL-MCNC: 0.2 MG/DL (ref 0.2–1)
BILIRUB UR QL: NEGATIVE
BUN SERPL-MCNC: 10 MG/DL (ref 7–18)
BUN SERPL-MCNC: 9 MG/DL (ref 7–18)
BUN/CREAT SERPL: 9 (ref 12–20)
BUN/CREAT SERPL: 9 (ref 12–20)
CALCIUM SERPL-MCNC: 8.7 MG/DL (ref 8.5–10.1)
CALCIUM SERPL-MCNC: 8.7 MG/DL (ref 8.5–10.1)
CALCULATED P AXIS, ECG09: 52 DEGREES
CALCULATED P AXIS, ECG09: 66 DEGREES
CALCULATED R AXIS, ECG10: 54 DEGREES
CALCULATED R AXIS, ECG10: 68 DEGREES
CALCULATED T AXIS, ECG11: 38 DEGREES
CALCULATED T AXIS, ECG11: 42 DEGREES
CHLORIDE SERPL-SCNC: 107 MMOL/L (ref 100–111)
CHLORIDE SERPL-SCNC: 107 MMOL/L (ref 100–111)
CK MB CFR SERPL CALC: ABNORMAL % (ref 0–4)
CK MB SERPL-MCNC: <1 NG/ML (ref 5–25)
CK SERPL-CCNC: 315 U/L (ref 39–308)
CO2 SERPL-SCNC: 28 MMOL/L (ref 21–32)
CO2 SERPL-SCNC: 29 MMOL/L (ref 21–32)
COLOR UR: YELLOW
CREAT SERPL-MCNC: 1.02 MG/DL (ref 0.6–1.3)
CREAT SERPL-MCNC: 1.15 MG/DL (ref 0.6–1.3)
DIAGNOSIS, 93000: NORMAL
DIAGNOSIS, 93000: NORMAL
DIFFERENTIAL METHOD BLD: ABNORMAL
DIFFERENTIAL METHOD BLD: NORMAL
EOSINOPHIL # BLD: 0.1 K/UL (ref 0–0.4)
EOSINOPHIL # BLD: 0.2 K/UL (ref 0–0.4)
EOSINOPHIL NFR BLD: 1 % (ref 0–5)
EOSINOPHIL NFR BLD: 3 % (ref 0–5)
EPITH CASTS URNS QL MICRO: ABNORMAL /LPF (ref 0–5)
ERYTHROCYTE [DISTWIDTH] IN BLOOD BY AUTOMATED COUNT: 13.2 % (ref 11.6–14.5)
ERYTHROCYTE [DISTWIDTH] IN BLOOD BY AUTOMATED COUNT: 13.4 % (ref 11.6–14.5)
GLOBULIN SER CALC-MCNC: 3.8 G/DL (ref 2–4)
GLOBULIN SER CALC-MCNC: 3.9 G/DL (ref 2–4)
GLUCOSE SERPL-MCNC: 108 MG/DL (ref 74–99)
GLUCOSE SERPL-MCNC: 96 MG/DL (ref 74–99)
GLUCOSE UR STRIP.AUTO-MCNC: NEGATIVE MG/DL
HCT VFR BLD AUTO: 41.2 % (ref 36–48)
HCT VFR BLD AUTO: 43 % (ref 36–48)
HGB BLD-MCNC: 13.3 G/DL (ref 13–16)
HGB BLD-MCNC: 13.7 G/DL (ref 13–16)
HGB UR QL STRIP: NEGATIVE
HYALINE CASTS URNS QL MICRO: ABNORMAL /LPF (ref 0–2)
KETONES UR QL STRIP.AUTO: NEGATIVE MG/DL
LEUKOCYTE ESTERASE UR QL STRIP.AUTO: NEGATIVE
LYMPHOCYTES # BLD: 2.1 K/UL (ref 0.9–3.6)
LYMPHOCYTES # BLD: 2.9 K/UL (ref 0.9–3.6)
LYMPHOCYTES NFR BLD: 43 % (ref 21–52)
LYMPHOCYTES NFR BLD: 56 % (ref 21–52)
MCH RBC QN AUTO: 28.5 PG (ref 24–34)
MCH RBC QN AUTO: 28.8 PG (ref 24–34)
MCHC RBC AUTO-ENTMCNC: 31.9 G/DL (ref 31–37)
MCHC RBC AUTO-ENTMCNC: 32.3 G/DL (ref 31–37)
MCV RBC AUTO: 88.4 FL (ref 78–100)
MCV RBC AUTO: 90.5 FL (ref 78–100)
MONOCYTES # BLD: 0.3 K/UL (ref 0.05–1.2)
MONOCYTES # BLD: 0.5 K/UL (ref 0.05–1.2)
MONOCYTES NFR BLD: 10 % (ref 3–10)
MONOCYTES NFR BLD: 6 % (ref 3–10)
MUCOUS THREADS URNS QL MICRO: ABNORMAL /LPF
NEUTS SEG # BLD: 2 K/UL (ref 1.8–8)
NEUTS SEG # BLD: 2.1 K/UL (ref 1.8–8)
NEUTS SEG NFR BLD: 37 % (ref 40–73)
NEUTS SEG NFR BLD: 43 % (ref 40–73)
NITRITE UR QL STRIP.AUTO: NEGATIVE
P-R INTERVAL, ECG05: 172 MS
P-R INTERVAL, ECG05: 184 MS
PH UR STRIP: 6 [PH] (ref 5–8)
PLATELET # BLD AUTO: 252 K/UL (ref 135–420)
PLATELET # BLD AUTO: 258 K/UL (ref 135–420)
PLATELET COMMENTS,PCOM: ABNORMAL
PMV BLD AUTO: 11 FL (ref 9.2–11.8)
PMV BLD AUTO: 11.4 FL (ref 9.2–11.8)
POTASSIUM SERPL-SCNC: 4 MMOL/L (ref 3.5–5.5)
POTASSIUM SERPL-SCNC: 4.7 MMOL/L (ref 3.5–5.5)
PROT SERPL-MCNC: 7.1 G/DL (ref 6.4–8.2)
PROT SERPL-MCNC: 7.5 G/DL (ref 6.4–8.2)
PROT UR STRIP-MCNC: 100 MG/DL
Q-T INTERVAL, ECG07: 416 MS
Q-T INTERVAL, ECG07: 452 MS
QRS DURATION, ECG06: 116 MS
QRS DURATION, ECG06: 126 MS
QTC CALCULATION (BEZET), ECG08: 428 MS
QTC CALCULATION (BEZET), ECG08: 429 MS
RBC # BLD AUTO: 4.66 M/UL (ref 4.35–5.65)
RBC # BLD AUTO: 4.75 M/UL (ref 4.35–5.65)
RBC #/AREA URNS HPF: NEGATIVE /HPF (ref 0–5)
RBC MORPH BLD: ABNORMAL
SODIUM SERPL-SCNC: 138 MMOL/L (ref 136–145)
SODIUM SERPL-SCNC: 139 MMOL/L (ref 136–145)
SP GR UR REFRACTOMETRY: 1.01 (ref 1–1.03)
TROPONIN I SERPL-MCNC: <0.02 NG/ML (ref 0–0.04)
UROBILINOGEN UR QL STRIP.AUTO: 1 EU/DL (ref 0.2–1)
VENTRICULAR RATE, ECG03: 54 BPM
VENTRICULAR RATE, ECG03: 64 BPM
WBC # BLD AUTO: 4.8 K/UL (ref 4.6–13.2)
WBC # BLD AUTO: 5.3 K/UL (ref 4.6–13.2)
WBC URNS QL MICRO: NEGATIVE /HPF (ref 0–5)

## 2021-10-17 PROCEDURE — 74011250636 HC RX REV CODE- 250/636: Performed by: EMERGENCY MEDICINE

## 2021-10-17 PROCEDURE — 99283 EMERGENCY DEPT VISIT LOW MDM: CPT

## 2021-10-17 PROCEDURE — 93005 ELECTROCARDIOGRAM TRACING: CPT

## 2021-10-17 PROCEDURE — 82553 CREATINE MB FRACTION: CPT

## 2021-10-17 PROCEDURE — 96374 THER/PROPH/DIAG INJ IV PUSH: CPT

## 2021-10-17 PROCEDURE — 81001 URINALYSIS AUTO W/SCOPE: CPT

## 2021-10-17 PROCEDURE — 99284 EMERGENCY DEPT VISIT MOD MDM: CPT

## 2021-10-17 PROCEDURE — 85025 COMPLETE CBC W/AUTO DIFF WBC: CPT

## 2021-10-17 PROCEDURE — 70450 CT HEAD/BRAIN W/O DYE: CPT

## 2021-10-17 PROCEDURE — 74176 CT ABD & PELVIS W/O CONTRAST: CPT

## 2021-10-17 PROCEDURE — 80053 COMPREHEN METABOLIC PANEL: CPT

## 2021-10-17 PROCEDURE — 74011250637 HC RX REV CODE- 250/637: Performed by: EMERGENCY MEDICINE

## 2021-10-17 RX ORDER — KETOROLAC TROMETHAMINE 30 MG/ML
30 INJECTION, SOLUTION INTRAMUSCULAR; INTRAVENOUS ONCE
Status: COMPLETED | OUTPATIENT
Start: 2021-10-17 | End: 2021-10-17

## 2021-10-17 RX ORDER — ACETAMINOPHEN 500 MG
1000 TABLET ORAL ONCE
Status: COMPLETED | OUTPATIENT
Start: 2021-10-17 | End: 2021-10-17

## 2021-10-17 RX ADMIN — SODIUM CHLORIDE 1000 ML: 900 INJECTION, SOLUTION INTRAVENOUS at 16:33

## 2021-10-17 RX ADMIN — KETOROLAC TROMETHAMINE 30 MG: 30 INJECTION, SOLUTION INTRAMUSCULAR at 16:40

## 2021-10-17 RX ADMIN — ACETAMINOPHEN 1000 MG: 500 TABLET ORAL at 16:40

## 2021-10-17 NOTE — ED PROVIDER NOTES
EMERGENCY DEPARTMENT HISTORY AND PHYSICAL EXAM    Date: 10/17/2021  Patient Name: Chloe Emanuel    History of Presenting Illness     Chief Complaint   Patient presents with    Dizziness         History Provided By: Patient  4:18 PM  Chloe Emanuel is a 31 y.o. male with PMHX of asthma who presents to the emergency department C/O dizziness. Per patient he was seen in this emergency department overnight after fainting episode at home. He reports it occurred while he was urinating and he did strike his head. He reports he was evaluated with CTs and blood work and discharged home. He reports when he went to work today his boss felt that he was weaving on the WIB. Patient reports he feels dizzy when he changes position and still has a headache. He denies any fever, chest pain, nausea, vomiting, other complaints. No other relieving or exacerbating factors identified. PCP: None        Past History     Past Medical History:  Past Medical History:   Diagnosis Date    Asthma        Past Surgical History:  Past Surgical History:   Procedure Laterality Date    HX APPENDECTOMY         Family History:  History reviewed. No pertinent family history. Social History:  Social History     Tobacco Use    Smoking status: Never Smoker    Smokeless tobacco: Never Used   Substance Use Topics    Alcohol use: Not Currently     Comment: socially     Drug use: Not Currently     Types: Marijuana       Allergies:  No Known Allergies      Review of Systems   Review of Systems   Constitutional: Negative for fever. Respiratory: Negative for shortness of breath. Cardiovascular: Negative for chest pain. Gastrointestinal: Negative for abdominal pain. Neurological: Positive for dizziness and headaches. All other systems reviewed and are negative.         Physical Exam     Vitals:    10/17/21 1710 10/17/21 1720 10/17/21 1730 10/17/21 1739   BP:    111/72   Pulse:       Resp:       Temp:       SpO2: 100% 100% 100% Weight:       Height:         Physical Exam    Nursing notes and vital signs reviewed    Constitutional: Non toxic appearing, moderate distress  Head: Normocephalic, Atraumatic  Eyes: EOMI, PERRL  Neck: Supple  Cardiovascular: Regular rate and rhythm, no murmurs, rubs, or gallops  Chest: Normal work of breathing and chest excursion bilaterally  Lungs: Clear to ausculation bilaterally  Back: No evidence of trauma or deformity  Extremities: No evidence of trauma or deformity, no LE edema  Skin: Warm and dry, normal cap refill  Neuro: Alert and appropriate, CN intact, normal speech, strength and sensation full and symmetric bilaterally, normal gait, normal coordination  Psychiatric: Normal mood and affect      Diagnostic Study Results     Labs -     Recent Results (from the past 12 hour(s))   CBC WITH AUTOMATED DIFF    Collection Time: 10/17/21  4:30 PM   Result Value Ref Range    WBC 4.8 4.6 - 13.2 K/uL    RBC 4.75 4.35 - 5.65 M/uL    HGB 13.7 13.0 - 16.0 g/dL    HCT 43.0 36.0 - 48.0 %    MCV 90.5 78.0 - 100.0 FL    MCH 28.8 24.0 - 34.0 PG    MCHC 31.9 31.0 - 37.0 g/dL    RDW 13.2 11.6 - 14.5 %    PLATELET 689 874 - 978 K/uL    MPV 11.4 9.2 - 11.8 FL    NEUTROPHILS 43 40 - 73 %    LYMPHOCYTES 43 21 - 52 %    MONOCYTES 10 3 - 10 %    EOSINOPHILS 3 0 - 5 %    BASOPHILS 1 0 - 2 %    ABS. NEUTROPHILS 2.1 1.8 - 8.0 K/UL    ABS. LYMPHOCYTES 2.1 0.9 - 3.6 K/UL    ABS. MONOCYTES 0.5 0.05 - 1.2 K/UL    ABS. EOSINOPHILS 0.2 0.0 - 0.4 K/UL    ABS.  BASOPHILS 0.1 0.0 - 0.1 K/UL    DF AUTOMATED     METABOLIC PANEL, COMPREHENSIVE    Collection Time: 10/17/21  4:30 PM   Result Value Ref Range    Sodium 138 136 - 145 mmol/L    Potassium 4.7 3.5 - 5.5 mmol/L    Chloride 107 100 - 111 mmol/L    CO2 28 21 - 32 mmol/L    Anion gap 3 3.0 - 18 mmol/L    Glucose 96 74 - 99 mg/dL    BUN 9 7.0 - 18 MG/DL    Creatinine 1.02 0.6 - 1.3 MG/DL    BUN/Creatinine ratio 9 (L) 12 - 20      GFR est AA >60 >60 ml/min/1.73m2    GFR est non-AA >60 >60 ml/min/1.73m2    Calcium 8.7 8.5 - 10.1 MG/DL    Bilirubin, total 0.2 0.2 - 1.0 MG/DL    ALT (SGPT) 27 16 - 61 U/L    AST (SGOT) 30 10 - 38 U/L    Alk. phosphatase 83 45 - 117 U/L    Protein, total 7.5 6.4 - 8.2 g/dL    Albumin 3.6 3.4 - 5.0 g/dL    Globulin 3.9 2.0 - 4.0 g/dL    A-G Ratio 0.9 0.8 - 1.7     CARDIAC PANEL,(CK, CKMB & TROPONIN)    Collection Time: 10/17/21  4:30 PM   Result Value Ref Range    CK - MB <1.0 <3.6 ng/ml    CK-MB Index  0.0 - 4.0 %     CALCULATION NOT PERFORMED WHEN RESULT IS BELOW LINEAR LIMIT     (H) 39 - 308 U/L    Troponin-I, QT <0.02 0.0 - 0.045 NG/ML   EKG, 12 LEAD, INITIAL    Collection Time: 10/17/21  4:31 PM   Result Value Ref Range    Ventricular Rate 64 BPM    Atrial Rate 64 BPM    P-R Interval 172 ms    QRS Duration 116 ms    Q-T Interval 416 ms    QTC Calculation (Bezet) 429 ms    Calculated P Axis 66 degrees    Calculated R Axis 54 degrees    Calculated T Axis 42 degrees    Diagnosis       Normal sinus rhythm  Normal ECG  When compared with ECG of 17-OCT-2021 04:04,  No significant change was found         Radiologic Studies -   CT HEAD WO CONT   Final Result      No acute intracranial abnormalities. CT Results  (Last 48 hours)               10/17/21 1707  CT HEAD WO CONT Final result    Impression:      No acute intracranial abnormalities. Narrative:  EXAM: CT head       INDICATION: Dizziness       COMPARISON: September 18, 2018       TECHNIQUE: Axial CT imaging of the head was performed without intravenous   contrast. One or more dose reduction techniques were used on this CT: automated   exposure control, adjustment of the mAs and/or kVp according to patient size,   and iterative reconstruction techniques. The specific techniques used on this   CT exam have been documented in the patient's electronic medical record.  Digital   Imaging and Communications in Medicine (DICOM) format image data are available   to nonaffiliated external healthcare facilities or entities on a secure, media   free, reciprocally searchable basis with patient authorization for at least a   12-month period after this study. _______________       FINDINGS:       BRAIN AND POSTERIOR FOSSA: The sulci, folia, ventricles and basal cisterns are   within normal limits for the patient's age. There is no intracranial hemorrhage,   mass effect, or midline shift. There are no areas of abnormal parenchymal   attenuation. EXTRA-AXIAL SPACES AND MENINGES: There are no abnormal extra-axial fluid   collections. CALVARIUM: Intact. SINUSES: Clear. OTHER: None.       _______________           10/17/21 0541  CT ABD PELV WO CONT Final result    Impression:      1. No evidence of nephrolithiasis, hydronephrosis, or other signs of obstructive   nephropathy. 2. No evidence of bowel obstruction or acute inflammatory process in the abdomen   or pelvis. Narrative:  EXAM: CT of the abdomen and pelvis       CLINICAL INDICATION/HISTORY:  Pain. COMPARISON: None. TECHNIQUE: Axial CT imaging of the abdomen and pelvis was performed without   contrast. Multiplanar reformats were generated. One or more dose reduction techniques were used on this CT: automated exposure   control, adjustment of the mAs and/or kVp according to patient size, and   iterative reconstruction techniques. The specific techniques used on this CT   exam have been documented in the patient's electronic medical record. Digital   Imaging and Communications in Medicine (DICOM) format image data are available   to nonaffiliated external healthcare facilities or entities on a secure, media   free, reciprocally searchable basis with patient authorization for at least a   12-month period after this study. _______________       FINDINGS:       LOWER CHEST: The visualized lung bases are clear. Heart size is normal. There is   no pericardial or pleural effusion.        LIVER, BILIARY: Liver is normal with no focal parenchymal lesion identified. There is no intra-or extrahepatic biliary ductal dilatation. Gallbladder is   unremarkable. PANCREAS: Normal.       SPLEEN: Normal.       ADRENALS: Normal.       KIDNEYS: Normal. There is no nephrolithiasis. There is no hydronephrosis,   hydroureter, or other signs of obstructive nephropathy. LYMPH NODES: No enlarged lymph nodes. GASTROINTESTINAL TRACT: There is no evidence of bowel obstruction. While limited   without contrast, there is no definitive wall thickening.] The appendix is   surgically absent. PELVIC ORGANS: Unremarkable. VASCULATURE: Unremarkable. BONES: No acute or aggressive osseous abnormalities identified. OTHER: There is no free intraperitoneal fluid or free air. SUPERFICIAL SOFT TISSUES: Unremarkable. Please note that evaluation of the intra-abdominal structures is somewhat   limited due to lack of oral or IV contrast.       _______________               CXR Results  (Last 48 hours)    None          Medications given in the ED-  Medications   sodium chloride 0.9 % bolus infusion 1,000 mL (0 mL IntraVENous IV Completed 10/17/21 1747)   ketorolac (TORADOL) injection 30 mg (30 mg IntraVENous Given 10/17/21 1640)   acetaminophen (TYLENOL) tablet 1,000 mg (1,000 mg Oral Given 10/17/21 1640)         Medical Decision Making   I am the first provider for this patient. I reviewed the vital signs, available nursing notes, past medical history, past surgical history, family history and social history. Vital Signs-Reviewed the patient's vital signs.     Pulse Oximetry Analysis - 100% on room air, not hypoxic     Cardiac Monitor:  Rate: 62 bpm  Rhythm: Normal sinus    EKG interpretation: (Preliminary)  EKG read by Dr. Wai Castillo at 4:34 PM  Normal sinus rhythm at a rate of 65 bpm, CA interval 172 ms, QRS duration 160 ms, similar when compared to prior from September 2018    Records Reviewed: Nursing Notes, Old Medical Records and Previous electrocardiograms    Provider Notes (Medical Decision Making): Margie Zafar is a 31 y.o. male presenting for dizziness after a syncopal episode with head strike last night. Reviewed records from the night prior. No focal neuro deficits here today and hemodynamically stable. No acute process identified on head CT, labs, EKG. Suspect postconcussive syndrome. Plan for discharge with primary care follow-up, concussion precautions, return precautions. Patient understands and agrees with this plan. Procedures:  Procedures    ED Course:   5:52 PM  Updated patient on all results and plan. All questions answered. Diagnosis and Disposition     Critical Care: None    DISCHARGE NOTE:    Katherine Barry  results have been reviewed with him. He has been counseled regarding his diagnosis, treatment, and plan. He verbally conveys understanding and agreement of the signs, symptoms, diagnosis, treatment and prognosis and additionally agrees to follow up as discussed. He also agrees with the care-plan and conveys that all of his questions have been answered. I have also provided discharge instructions for him that include: educational information regarding their diagnosis and treatment, and list of reasons why they would want to return to the ED prior to their follow-up appointment, should his condition change. He has been provided with education for proper emergency department utilization. CLINICAL IMPRESSION:    1. Dizziness    2. Concussion with loss of consciousness, initial encounter        PLAN:  1. D/C Home  2. There are no discharge medications for this patient.     3.   Follow-up Information     Follow up With Specialties Details Why Contact Info    Alverto Byrd MD Family Medicine Schedule an appointment as soon as possible for a visit  Or Your Primary Care Doctor [address] [place] [id number] [phone]      THE [facility] EMERGENCY DEPT Emergency Medicine  If symptoms worsen [address]   [place] [address]  [phone]        _______________________________      Please note that this dictation was completed with LoLo, the computer voice recognition software. Quite often unanticipated grammatical, syntax, homophones, and other interpretive errors are inadvertently transcribed by the computer software. Please disregard these errors. Please excuse any errors that have escaped final proofreading.

## 2021-10-17 NOTE — ED TRIAGE NOTES
Patient arrives ambulatory to ED with c/c of left flank pain that has been going on for a month progressively getting worse to the point that it has been hard for him to get out of the bed. He also states pta he had a syncopal episode while he was urinating and fell and hit the left side of his head on the wall.

## 2021-10-17 NOTE — DISCHARGE INSTRUCTIONS
Michelet or reduced prescriptions       [facility]       Free Referrals    Hours of Operation       Mon/Tue/Thur/Fri: 10am-2pm       Tue/Thurs: 5pm-8:30pm    Address       [address] [facility] [place], [place]     Phone       [phone]    [address] ([facility])     Lab Work        Prescription Coverage     Hours of Operation        By appointment only         Tue/Thurs: 5pm-8:30pm     Address         [facility] Department         [address] [place], [place]     Phone         [phone]    [facility]         Free or Reduced Prescriptions for Clients, if available    Hours of Operation         Mon-Fri: 8am - 5pm    Address         [address]    Phone         [phone]    [address] [place] [address] or Reduced Prescription Coverage     Hours of Operation        Mon: 9am - 11am        Thur: 5:30pm-8pm     Address        So          [place], [place]     Phone        [phone] [facility], OB/GYN, Pediatrics         Pharmacy Assistant Program         Sliding Scale after first 2 visits     Hours of Operation         Mon/Tue/Thur/Fri: 8:30am -5pm         Wed: 10:30am - 7pm     Address         [address] [place] [address]     Phone          [phone] [facility]         [facility]         Sliding Scale Payment         Prescription Program     Hours of Operation         Mon/Wed/Fri: 8:30am - 5pm         Tue: 7am - 7pm         Thur: 8am - noon; 4pm - 8pm      Address         [address] [place] [address] [place], [address] [address]       Phone         [phone]                [facility] - [address]         Reduced Fee Prescriptions     Hours of Operation         Mon-Fri: 8am - 5pm     Address         [address] [place], [address] [facility]     Phone         [phone]       [address] [facility]         Reduced Fee Prescriptions     Hours of Operation         Mon/Wed/Thur/Fri: 8am - 5pm         Tue: 8am - 7pm     Address         [address] [facility].          [facility], [address] [facility]     Phone         [phone]

## 2021-10-17 NOTE — Clinical Note
[facility] [place]  [facility]  [address]  [facility] [address] [phone]    Work/School Note    Date: 10/17/2021    To Whom It May concern:    Shawn Gonzalez was seen and treated today in the emergency room by the following provider(s):  Attending Provider: Brenda Franklin MD.      Shawn Gonzalez is excused from work/school on 10/17/2021 through 10/20/2021. He is medically clear to return to work/school on 10/21/2021.         Sincerely,          Noreen Davidson MD

## 2021-10-17 NOTE — ED TRIAGE NOTES
Pt states \" This morning I passed out and hit my head and I have been dizzy ever since I was seen here but they were taking to long so I left. My boss told me to come back to get checked because I seem off. \"

## 2021-10-17 NOTE — ED PROVIDER NOTES
EMERGENCY DEPARTMENT HISTORY AND PHYSICAL EXAM    Date: 10/17/2021  Patient Name: Juan Solo    History of Presenting Illness     Chief Complaint   Patient presents with    Flank Pain    Syncope         History Provided By: Patient      Juan Solo is a 31 y.o. male with no known chronic medical conditions presents for evaluation of left-sided flank pain, syncope. Patient has had ongoing intermittent left-sided flank pain over the last month. This morning patient felt like he was taking a long time to go to the bathroom and passed out while he was urinating. He denies any preceding chest pain, shortness of breath, abdominal pain, nausea, vomiting. This was witnessed by patient's significant other who did not notice any seizure-like activity. Patient did not bite his tongue, have any jerking, postictal period, bladder or bowel incontinence. PCP: None        Past History     Past Medical History:  Past Medical History:   Diagnosis Date    Asthma        Past Surgical History:  Past Surgical History:   Procedure Laterality Date    HX APPENDECTOMY         Family History:  History reviewed. No pertinent family history. Social History:  Social History     Tobacco Use    Smoking status: Never Smoker    Smokeless tobacco: Never Used   Substance Use Topics    Alcohol use: Not Currently     Comment: socially     Drug use: Not Currently     Types: Marijuana       Allergies:  No Known Allergies      Review of Systems   Review of Systems   Constitutional: Negative for activity change and fever. HENT: Negative for congestion and sore throat. Eyes: Negative for discharge. Respiratory: Negative for apnea. Cardiovascular: Negative for chest pain. Gastrointestinal: Negative for abdominal distention. Genitourinary: Negative for dysuria and flank pain. Musculoskeletal: Positive for back pain. Negative for arthralgias. Skin: Negative for rash.    Neurological: Negative for dizziness and weakness. Hematological: Negative for adenopathy. Psychiatric/Behavioral: Negative for agitation. All other systems reviewed and are negative.       Physical Exam     Vitals:    10/17/21 0340   BP: 123/80   Pulse: 68   Resp: 18   Temp: 97.9 °F (36.6 °C)   SpO2: 100%   Weight: 108.9 kg (240 lb)   Height: 5' 11\" (1.803 m)     Physical Exam    Nursing notes and vital signs reviewed    Constitutional: Non toxic appearing, no acute distress  Head: Normocephalic, Atraumatic  Eyes: EOMI  Neck: Supple  Cardiovascular: Regular rate and rhythm, no murmurs, rubs, or gallops  Chest: Normal work of breathing and chest excursion bilaterally  Lungs: Clear to ausculation bilaterally  Abdomen: Soft, non tender, non distended, normoactive bowel sounds  Back: No evidence of trauma or deformity  Extremities: No evidence of trauma or deformity, no LE edema  Skin: Warm and dry, normal cap refill  Neuro: Alert and appropriate, CN intact, normal speech, strength and sensation full and symmetric bilaterally, normal gait, normal coordination  Psychiatric: Normal mood and affect      Diagnostic Study Results     Labs -     Recent Results (from the past 12 hour(s))   URINALYSIS W/ RFLX MICROSCOPIC    Collection Time: 10/17/21  3:50 AM   Result Value Ref Range    Color YELLOW      Appearance CLEAR      Specific gravity 1.014 1.005 - 1.030      pH (UA) 6.0 5.0 - 8.0      Protein 100 (A) NEG mg/dL    Glucose Negative NEG mg/dL    Ketone Negative NEG mg/dL    Bilirubin Negative NEG      Blood Negative NEG      Urobilinogen 1.0 0.2 - 1.0 EU/dL    Nitrites Negative NEG      Leukocyte Esterase Negative NEG     URINE MICROSCOPIC ONLY    Collection Time: 10/17/21  3:50 AM   Result Value Ref Range    WBC Negative 0 - 5 /hpf    RBC Negative 0 - 5 /hpf    Epithelial cells FEW 0 - 5 /lpf    Bacteria 1+ (A) NEG /hpf    Mucus 1+ (A) NEG /lpf    Hyaline cast 0 to 5 0 - 2 /lpf   CBC WITH AUTOMATED DIFF    Collection Time: 10/17/21  3:56 AM   Result Value Ref Range    WBC 5.3 4.6 - 13.2 K/uL    RBC 4.66 4.35 - 5.65 M/uL    HGB 13.3 13.0 - 16.0 g/dL    HCT 41.2 36.0 - 48.0 %    MCV 88.4 78.0 - 100.0 FL    MCH 28.5 24.0 - 34.0 PG    MCHC 32.3 31.0 - 37.0 g/dL    RDW 13.4 11.6 - 14.5 %    PLATELET 254 919 - 968 K/uL    MPV 11.0 9.2 - 11.8 FL    NEUTROPHILS 37 (L) 40 - 73 %    LYMPHOCYTES 56 (H) 21 - 52 %    MONOCYTES 6 3 - 10 %    EOSINOPHILS 1 0 - 5 %    BASOPHILS 0 0 - 2 %    ABS. NEUTROPHILS 2.0 1.8 - 8.0 K/UL    ABS. LYMPHOCYTES 2.9 0.9 - 3.6 K/UL    ABS. MONOCYTES 0.3 0.05 - 1.2 K/UL    ABS. EOSINOPHILS 0.1 0.0 - 0.4 K/UL    ABS. BASOPHILS 0.0 0.0 - 0.1 K/UL    DF MANUAL      PLATELET COMMENTS CLUMPED PLATELETS      RBC COMMENTS NORMOCYTIC, NORMOCHROMIC     METABOLIC PANEL, COMPREHENSIVE    Collection Time: 10/17/21  3:56 AM   Result Value Ref Range    Sodium 139 136 - 145 mmol/L    Potassium 4.0 3.5 - 5.5 mmol/L    Chloride 107 100 - 111 mmol/L    CO2 29 21 - 32 mmol/L    Anion gap 3 3.0 - 18 mmol/L    Glucose 108 (H) 74 - 99 mg/dL    BUN 10 7.0 - 18 MG/DL    Creatinine 1.15 0.6 - 1.3 MG/DL    BUN/Creatinine ratio 9 (L) 12 - 20      GFR est AA >60 >60 ml/min/1.73m2    GFR est non-AA >60 >60 ml/min/1.73m2    Calcium 8.7 8.5 - 10.1 MG/DL    Bilirubin, total 0.2 0.2 - 1.0 MG/DL    ALT (SGPT) 26 16 - 61 U/L    AST (SGOT) 19 10 - 38 U/L    Alk.  phosphatase 80 45 - 117 U/L    Protein, total 7.1 6.4 - 8.2 g/dL    Albumin 3.3 (L) 3.4 - 5.0 g/dL    Globulin 3.8 2.0 - 4.0 g/dL    A-G Ratio 0.9 0.8 - 1.7     EKG, 12 LEAD, INITIAL    Collection Time: 10/17/21  4:02 AM   Result Value Ref Range    Ventricular Rate 54 BPM    Atrial Rate 54 BPM    P-R Interval 184 ms    QRS Duration 126 ms    Q-T Interval 452 ms    QTC Calculation (Bezet) 428 ms    Calculated P Axis 52 degrees    Calculated R Axis 68 degrees    Calculated T Axis 38 degrees    Diagnosis       Sinus bradycardia  Nonspecific intraventricular block  Abnormal ECG  When compared with ECG of 18-SEP-2018 19:06,  No significant change was found         Radiologic Studies -   CT ABD PELV WO CONT   Final Result      1. No evidence of nephrolithiasis, hydronephrosis, or other signs of obstructive   nephropathy. 2. No evidence of bowel obstruction or acute inflammatory process in the abdomen   or pelvis. CT Results  (Last 48 hours)               10/17/21 0541  CT ABD PELV WO CONT Final result    Impression:      1. No evidence of nephrolithiasis, hydronephrosis, or other signs of obstructive   nephropathy. 2. No evidence of bowel obstruction or acute inflammatory process in the abdomen   or pelvis. Narrative:  EXAM: CT of the abdomen and pelvis       CLINICAL INDICATION/HISTORY:  Pain. COMPARISON: None. TECHNIQUE: Axial CT imaging of the abdomen and pelvis was performed without   contrast. Multiplanar reformats were generated. One or more dose reduction techniques were used on this CT: automated exposure   control, adjustment of the mAs and/or kVp according to patient size, and   iterative reconstruction techniques. The specific techniques used on this CT   exam have been documented in the patient's electronic medical record. Digital   Imaging and Communications in Medicine (DICOM) format image data are available   to nonaffiliated external healthcare facilities or entities on a secure, media   free, reciprocally searchable basis with patient authorization for at least a   12-month period after this study. _______________       FINDINGS:       LOWER CHEST: The visualized lung bases are clear. Heart size is normal. There is   no pericardial or pleural effusion. LIVER, BILIARY: Liver is normal with no focal parenchymal lesion identified. There is no intra-or extrahepatic biliary ductal dilatation. Gallbladder is   unremarkable. PANCREAS: Normal.       SPLEEN: Normal.       ADRENALS: Normal.       KIDNEYS: Normal. There is no nephrolithiasis.  There is no hydronephrosis, hydroureter, or other signs of obstructive nephropathy. LYMPH NODES: No enlarged lymph nodes. GASTROINTESTINAL TRACT: There is no evidence of bowel obstruction. While limited   without contrast, there is no definitive wall thickening.] The appendix is   surgically absent. PELVIC ORGANS: Unremarkable. VASCULATURE: Unremarkable. BONES: No acute or aggressive osseous abnormalities identified. OTHER: There is no free intraperitoneal fluid or free air. SUPERFICIAL SOFT TISSUES: Unremarkable. Please note that evaluation of the intra-abdominal structures is somewhat   limited due to lack of oral or IV contrast.       _______________               CXR Results  (Last 48 hours)    None          Medications given in the ED-  Medications - No data to display      Medical Decision Making   I am the first provider for this patient. I reviewed the vital signs, available nursing notes, past medical history, past surgical history, family history and social history. Vital Signs-Reviewed the patient's vital signs. Pulse Oximetry Analysis - 100% on room air, not hypoxic     Cardiac Monitor:  Rate: 68 bpm  Rhythm: Normal sinus rhythm    EKG interpretation: (Preliminary)  EKG read by Dr. Daniel Peguero at 404   Sinus bradycardia, rate 54  Normal intervals, , , QTc 430  No ST elevation, T wave inversion    Records Reviewed: Old Medical Records    Provider Notes (Medical Decision Making): Lisa Cox is a 31 y.o. male patient presents with a syncopal or near-syncopal episode in the setting of urination. CBC, CMP, urinalysis, CT abdomen pelvis obtained and found to be unremarkable. EKG negative. There is no suspicion that the event is secondary to an arrhythmia such as WPW, prolonged QT syndrome, or ventricular tachycardia.  There is no suspicion for a seizure episode, intracranial hemorrhage, pulmonary embolus, myocardial infarction, sepsis, hemorrhagic shock, or hypoglycemia. Based on the ED evaluation, there is no emergent medical condition. Syncope precautions were discussed with the patient and/or caretaker, specifically not to swim or bathe unattended, not to operate motor vehicles or other machinery, and to avoid heights or other areas where falls may occur until cleared by primary care physician. Patient is safe for discharge. The patient appears to understand the discussion and agrees with the plan. Procedures:  Procedures    ED Course:     Diagnosis and Disposition       DISCHARGE NOTE:    Veronika Amador  results have been reviewed with him. He has been counseled regarding his diagnosis, treatment, and plan. He verbally conveys understanding and agreement of the signs, symptoms, diagnosis, treatment and prognosis and additionally agrees to follow up as discussed. He also agrees with the care-plan and conveys that all of his questions have been answered. I have also provided discharge instructions for him that include: educational information regarding their diagnosis and treatment, and list of reasons why they would want to return to the ED prior to their follow-up appointment, should his condition change. He has been provided with education for proper emergency department utilization. CLINICAL IMPRESSION:    1. Flank pain    2. Syncope and collapse        PLAN:  1. D/C Home  2. There are no discharge medications for this patient. 3.   Follow-up Information     Follow up With Specialties Details Why [address]    THE [facility] EMERGENCY DEPT Emergency Medicine  As needed, If symptoms worsen [address] [place] [address] [phone]        _______________________________      Please note that this dictation was completed with [employer], the computer voice recognition software. Quite often unanticipated grammatical, syntax, homophones, and other interpretive errors are inadvertently transcribed by the computer software.   Please disregard these errors. Please excuse any errors that have escaped final proofreading.

## 2022-03-17 ENCOUNTER — HOSPITAL ENCOUNTER (EMERGENCY)
Age: 32
Discharge: HOME | End: 2022-03-18
Attending: EMERGENCY MEDICINE
Payer: COMMERCIAL

## 2022-03-17 ENCOUNTER — APPOINTMENT (OUTPATIENT)
Dept: CT IMAGING | Age: 32
End: 2022-03-17
Attending: EMERGENCY MEDICINE
Payer: COMMERCIAL

## 2022-03-17 VITALS
OXYGEN SATURATION: 99 % | TEMPERATURE: 97.5 F | RESPIRATION RATE: 16 BRPM | DIASTOLIC BLOOD PRESSURE: 82 MMHG | HEART RATE: 99 BPM | SYSTOLIC BLOOD PRESSURE: 117 MMHG

## 2022-03-17 DIAGNOSIS — R11.2 NON-INTRACTABLE VOMITING WITH NAUSEA, UNSPECIFIED VOMITING TYPE: Primary | ICD-10-CM

## 2022-03-17 LAB
ALBUMIN SERPL-MCNC: 4.3 G/DL (ref 3.4–5)
ALBUMIN/GLOB SERPL: 0.9 {RATIO} (ref 0.8–1.7)
ALP SERPL-CCNC: 86 U/L (ref 45–117)
ALT SERPL-CCNC: 43 U/L (ref 16–61)
ANION GAP SERPL CALC-SCNC: 6 MMOL/L (ref 3–18)
APPEARANCE UR: CLEAR
AST SERPL-CCNC: 25 U/L (ref 10–38)
BACTERIA URNS QL MICRO: ABNORMAL /HPF
BASOPHILS # BLD: 0 K/UL (ref 0–0.1)
BASOPHILS NFR BLD: 0 % (ref 0–2)
BILIRUB SERPL-MCNC: 0.5 MG/DL (ref 0.2–1)
BILIRUB UR QL: NEGATIVE
BUN SERPL-MCNC: 13 MG/DL (ref 7–18)
BUN/CREAT SERPL: 10 (ref 12–20)
CALCIUM SERPL-MCNC: 9.6 MG/DL (ref 8.5–10.1)
CHLORIDE SERPL-SCNC: 103 MMOL/L (ref 100–111)
CO2 SERPL-SCNC: 28 MMOL/L (ref 21–32)
COLOR UR: YELLOW
CREAT SERPL-MCNC: 1.31 MG/DL (ref 0.6–1.3)
DIFFERENTIAL METHOD BLD: ABNORMAL
EOSINOPHIL # BLD: 0 K/UL (ref 0–0.4)
EOSINOPHIL NFR BLD: 0 % (ref 0–5)
EPITH CASTS URNS QL MICRO: ABNORMAL /LPF (ref 0–5)
ERYTHROCYTE [DISTWIDTH] IN BLOOD BY AUTOMATED COUNT: 13.4 % (ref 11.6–14.5)
GLOBULIN SER CALC-MCNC: 4.6 G/DL (ref 2–4)
GLUCOSE SERPL-MCNC: 93 MG/DL (ref 74–99)
GLUCOSE UR STRIP.AUTO-MCNC: NEGATIVE MG/DL
HCT VFR BLD AUTO: 48 % (ref 36–48)
HGB BLD-MCNC: 15.7 G/DL (ref 13–16)
HGB UR QL STRIP: ABNORMAL
IMM GRANULOCYTES # BLD AUTO: 0 K/UL (ref 0–0.04)
IMM GRANULOCYTES NFR BLD AUTO: 0 % (ref 0–0.5)
KETONES UR QL STRIP.AUTO: 15 MG/DL
LEUKOCYTE ESTERASE UR QL STRIP.AUTO: ABNORMAL
LIPASE SERPL-CCNC: 142 U/L (ref 73–393)
LYMPHOCYTES # BLD: 0.9 K/UL (ref 0.9–3.6)
LYMPHOCYTES NFR BLD: 13 % (ref 21–52)
MCH RBC QN AUTO: 28.4 PG (ref 24–34)
MCHC RBC AUTO-ENTMCNC: 32.7 G/DL (ref 31–37)
MCV RBC AUTO: 87 FL (ref 78–100)
MONOCYTES # BLD: 0.4 K/UL (ref 0.05–1.2)
MONOCYTES NFR BLD: 6 % (ref 3–10)
MUCOUS THREADS URNS QL MICRO: ABNORMAL /LPF
NEUTS SEG # BLD: 5.4 K/UL (ref 1.8–8)
NEUTS SEG NFR BLD: 79 % (ref 40–73)
NITRITE UR QL STRIP.AUTO: NEGATIVE
NRBC # BLD: 0 K/UL (ref 0–0.01)
NRBC BLD-RTO: 0 PER 100 WBC
PH UR STRIP: 8.5 [PH] (ref 5–8)
PLATELET # BLD AUTO: 281 K/UL (ref 135–420)
PMV BLD AUTO: 11.1 FL (ref 9.2–11.8)
POTASSIUM SERPL-SCNC: 4.3 MMOL/L (ref 3.5–5.5)
PROT SERPL-MCNC: 8.9 G/DL (ref 6.4–8.2)
PROT UR STRIP-MCNC: 30 MG/DL
RBC # BLD AUTO: 5.52 M/UL (ref 4.35–5.65)
RBC #/AREA URNS HPF: ABNORMAL /HPF (ref 0–5)
SODIUM SERPL-SCNC: 137 MMOL/L (ref 136–145)
SP GR UR REFRACTOMETRY: 1.03 (ref 1–1.03)
UROBILINOGEN UR QL STRIP.AUTO: 1 EU/DL (ref 0.2–1)
WBC # BLD AUTO: 6.8 K/UL (ref 4.6–13.2)
WBC URNS QL MICRO: ABNORMAL /HPF (ref 0–5)

## 2022-03-17 PROCEDURE — 74176 CT ABD & PELVIS W/O CONTRAST: CPT

## 2022-03-17 PROCEDURE — 96374 THER/PROPH/DIAG INJ IV PUSH: CPT

## 2022-03-17 PROCEDURE — 99284 EMERGENCY DEPT VISIT MOD MDM: CPT

## 2022-03-17 PROCEDURE — 74011250636 HC RX REV CODE- 250/636: Performed by: EMERGENCY MEDICINE

## 2022-03-17 PROCEDURE — 81001 URINALYSIS AUTO W/SCOPE: CPT

## 2022-03-17 PROCEDURE — 83690 ASSAY OF LIPASE: CPT

## 2022-03-17 PROCEDURE — 80053 COMPREHEN METABOLIC PANEL: CPT

## 2022-03-17 PROCEDURE — 96375 TX/PRO/DX INJ NEW DRUG ADDON: CPT

## 2022-03-17 PROCEDURE — 96361 HYDRATE IV INFUSION ADD-ON: CPT

## 2022-03-17 PROCEDURE — 85025 COMPLETE CBC W/AUTO DIFF WBC: CPT

## 2022-03-17 PROCEDURE — 74011000258 HC RX REV CODE- 258: Performed by: EMERGENCY MEDICINE

## 2022-03-17 RX ORDER — MORPHINE SULFATE 4 MG/ML
4 INJECTION INTRAVENOUS
Status: COMPLETED | OUTPATIENT
Start: 2022-03-17 | End: 2022-03-17

## 2022-03-17 RX ADMIN — PROMETHAZINE HYDROCHLORIDE 25 MG: 25 INJECTION INTRAMUSCULAR; INTRAVENOUS at 21:49

## 2022-03-17 RX ADMIN — SODIUM CHLORIDE 1000 ML: 9 INJECTION, SOLUTION INTRAVENOUS at 21:41

## 2022-03-17 RX ADMIN — MORPHINE SULFATE 4 MG: 4 INJECTION INTRAVENOUS at 23:51

## 2022-03-17 NOTE — Clinical Note
[facility] [place]  [facility] EMERGENCY DEPT  [address] [facility] NEWS [facility] [address] [phone]    Work/School Note    Date: 3/17/2022    To Whom It May concern:    Nicolasa Tee was seen and treated today in the emergency room by the following provider(s):  Attending Provider: Scarlett Andrews MD.      Nicolasa Tee is excused from work/school on 03/18/22 and 03/19/22. He is medically clear to return to work/school on 3/20/2022.        Sincerely,          Jagdish Jain MD

## 2022-03-18 RX ORDER — PROMETHAZINE HYDROCHLORIDE 25 MG/1
25 TABLET ORAL
Qty: 12 TABLET | Refills: 0 | Status: SHIPPED | OUTPATIENT
Start: 2022-03-18 | End: 2022-05-07

## 2022-03-18 RX ORDER — HYOSCYAMINE SULFATE 0.12 MG/1
0.12 TABLET SUBLINGUAL
Qty: 12 TABLET | Refills: 0 | Status: SHIPPED | OUTPATIENT
Start: 2022-03-18 | End: 2022-05-07

## 2022-03-18 NOTE — ED NOTES
Pt arrived with c/o nausea, vomiting and diarrhea that started at 0600 this morning. Pt states his girlfriend was seen here just last week for similar symptoms. Pt girlfriend was seen in the L&D department and diagnosed with a viral GI bug. Pt states he took some zofran earlier but threw it back up. Pt states he took some motrin around 1400. Pt is alert and oriented x4. Vital signs are stable.

## 2022-03-18 NOTE — ED TRIAGE NOTES
\" I woke up this morning and my stomach felt uneasy I have been nauseated a vomiting all day more than 20xs I have vomited.  I have achy joints and chills\" vss nad noted

## 2022-03-18 NOTE — ED PROVIDER NOTES
69-year-old male presents emergency department with 1 day of nausea vomiting and reported back pain. Back pain is largely on the right side. States he has no medical history has a surgically surgery of an appendectomy. He does relate that his girlfriend with similar symptoms 1 week ago when she received IV fluids on labor and delivery as she was pregnant. Left emergency department no acute distress speaking complete sentences. Patient is morbidly obese. Pulse was 99 respirations of 16 no hypotension or fever. Past Medical History:   Diagnosis Date    Asthma        Past Surgical History:   Procedure Laterality Date    HX APPENDECTOMY           No family history on file. Social History     Socioeconomic History    Marital status: SINGLE     Spouse name: Not on file    Number of children: Not on file    Years of education: Not on file    Highest education level: Not on file   Occupational History    Not on file   Tobacco Use    Smoking status: Never Smoker    Smokeless tobacco: Never Used   Substance and Sexual Activity    Alcohol use: Not Currently     Comment: socially     Drug use: Not Currently     Types: Marijuana    Sexual activity: Not on file   Other Topics Concern    Not on file   Social History Narrative    Not on file     Social Determinants of Health     Financial Resource Strain:     Difficulty of Paying Living Expenses: Not on file   Food Insecurity:     Worried About Running Out of Food in the Last Year: Not on file    Kris of Food in the Last Year: Not on file   Transportation Needs:     Lack of Transportation (Medical): Not on file    Lack of Transportation (Non-Medical):  Not on file   Physical Activity:     Days of Exercise per Week: Not on file    Minutes of Exercise per Session: Not on file   Stress:     Feeling of Stress : Not on file   Social Connections:     Frequency of Communication with Friends and Family: Not on file    Frequency of Social Gatherings with Friends and Family: Not on file    Attends [employer] Services: Not on file    Active Member of Clubs or Organizations: Not on file    Attends Club or Organization Meetings: Not on file    Marital Status: Not on file   Intimate Partner Violence:     Fear of Current or Ex-Partner: Not on file    Emotionally Abused: Not on file    Physically Abused: Not on file    Sexually Abused: Not on file   Housing Stability:     Unable to Pay for Housing in the Last Year: Not on file    Number of [place] in the Last Year: Not on file    Unstable Housing in the Last Year: Not on file         ALLERGIES: Patient has no known allergies. Review of Systems   Constitutional: Positive for appetite change, chills, diaphoresis and fatigue. Negative for activity change, fever and unexpected weight change. HENT: Negative. Eyes: Negative. Respiratory: Negative. Cardiovascular: Negative. Gastrointestinal: Positive for abdominal pain, nausea and vomiting. Negative for anal bleeding, blood in stool, constipation, diarrhea and rectal pain. Endocrine: Negative. Genitourinary: Negative. Musculoskeletal: Positive for arthralgias, back pain and myalgias. Negative for gait problem, joint swelling, neck pain and neck stiffness. Skin: Negative. Allergic/Immunologic: Negative. Neurological: Negative. Hematological: Negative. Psychiatric/Behavioral: Negative. Vitals:    03/17/22 2030   BP: 117/82   Pulse: 99   Resp: 16   Temp: 97.5 °F (36.4 °C)   SpO2: 99%            Physical Exam  Vitals and nursing note reviewed. Constitutional:       General: He is not in acute distress. Appearance: He is obese. He is not ill-appearing, toxic-appearing or diaphoretic. HENT:      Head: Normocephalic. Nose: Nose normal. No congestion or rhinorrhea. Mouth/Throat:      Mouth: Mucous membranes are moist.      Pharynx: Oropharynx is clear.  No oropharyngeal exudate or posterior oropharyngeal erythema. Eyes:      General: No scleral icterus. Right eye: No discharge. Left eye: No discharge. Extraocular Movements: Extraocular movements intact. Conjunctiva/sclera: Conjunctivae normal.      Pupils: Pupils are equal, round, and reactive to light. Neck:      Vascular: No carotid bruit. Cardiovascular:      Rate and Rhythm: Normal rate and regular rhythm. Pulses: Normal pulses. Heart sounds: No murmur heard. No friction rub. No gallop. Pulmonary:      Effort: Pulmonary effort is normal.      Breath sounds: Normal breath sounds. Abdominal:      General: Abdomen is flat. There is distension. Palpations: There is no mass. Tenderness: There is abdominal tenderness. There is right CVA tenderness. There is no rebound. Hernia: No hernia is present. Musculoskeletal:         General: No swelling, tenderness, deformity or signs of injury. Normal range of motion. Cervical back: Normal range of motion. No rigidity or tenderness. Lymphadenopathy:      Cervical: No cervical adenopathy. Skin:     General: Skin is warm. Capillary Refill: Capillary refill takes less than 2 seconds. Coloration: Skin is not jaundiced or pale. Findings: No bruising or erythema. Neurological:      General: No focal deficit present. Mental Status: He is oriented to person, place, and time. Mental status is at baseline. Psychiatric:         Mood and Affect: Mood normal.         Behavior: Behavior normal.         Thought Content:  Thought content normal.         Judgment: Judgment normal.          MDM  Number of Diagnoses or Management Options  Non-intractable vomiting with nausea, unspecified vomiting type  Diagnosis management comments: 25-year-old male with no past medical history past surgical history of an appendectomy presents emergency department with complaint of 1 day of nausea vomiting cramping abdominal pain and back pain largely on the right side. Patient arrives emergency department no acute distress appears to be not feeling well though. No hypotension no tachycardia no fever. Patient is quite obese lungs are clear to auscultation bilaterally heart sounds are unremarkable. Abdominal exam limited somewhat by body habitus however no quadrant localized tenderness noted patient having some right-sided back pain from the CVA area down to the lumbar area. IV was started labs were sent patient given IV fluids and promethazine for nausea. Urine returned showing hematuria. CBC BMP largely unremarkable. With the back pain hematuria nausea vomiting prudent to rule out kidney stone. Patient was treated for pain sent for CAT scan. CT scan did not show renal stone or any other acute abdominal pathology. Likely viral in nature patient discharged home with symptomatic medications of promethazine and Levsin.   2-day work note follow-up with primary care physician given return precautions         Procedures

## 2022-03-19 PROBLEM — K37 APPENDICITIS: Status: ACTIVE | Noted: 2017-09-28

## 2022-05-07 ENCOUNTER — APPOINTMENT (OUTPATIENT)
Dept: CT IMAGING | Age: 32
End: 2022-05-07
Attending: EMERGENCY MEDICINE
Payer: COMMERCIAL

## 2022-05-07 ENCOUNTER — HOSPITAL ENCOUNTER (EMERGENCY)
Age: 32
Discharge: HOME | End: 2022-05-07
Attending: EMERGENCY MEDICINE
Payer: COMMERCIAL

## 2022-05-07 VITALS
TEMPERATURE: 98.4 F | OXYGEN SATURATION: 100 % | BODY MASS INDEX: 39.2 KG/M2 | HEART RATE: 61 BPM | RESPIRATION RATE: 18 BRPM | WEIGHT: 280 LBS | SYSTOLIC BLOOD PRESSURE: 130 MMHG | DIASTOLIC BLOOD PRESSURE: 82 MMHG | HEIGHT: 71 IN

## 2022-05-07 DIAGNOSIS — R11.2 NAUSEA AND VOMITING, UNSPECIFIED VOMITING TYPE: ICD-10-CM

## 2022-05-07 DIAGNOSIS — R19.7 DIARRHEA, UNSPECIFIED TYPE: ICD-10-CM

## 2022-05-07 DIAGNOSIS — R10.30 ABDOMINAL PAIN, LOWER: Primary | ICD-10-CM

## 2022-05-07 LAB
ABO + RH BLD: NORMAL
ALBUMIN SERPL-MCNC: 3.6 G/DL (ref 3.4–5)
ALBUMIN/GLOB SERPL: 0.7 {RATIO} (ref 0.8–1.7)
ALP SERPL-CCNC: 99 U/L (ref 45–117)
ALT SERPL-CCNC: 36 U/L (ref 16–61)
AMORPH CRY URNS QL MICRO: ABNORMAL
ANION GAP SERPL CALC-SCNC: 2 MMOL/L (ref 3–18)
APPEARANCE UR: ABNORMAL
AST SERPL-CCNC: 30 U/L (ref 10–38)
BACTERIA URNS QL MICRO: ABNORMAL /HPF
BASOPHILS # BLD: 0.1 K/UL (ref 0–0.1)
BASOPHILS NFR BLD: 1 % (ref 0–2)
BILIRUB SERPL-MCNC: 0.3 MG/DL (ref 0.2–1)
BILIRUB UR QL: NEGATIVE
BLOOD GROUP ANTIBODIES SERPL: NORMAL
BUN SERPL-MCNC: 12 MG/DL (ref 7–18)
BUN/CREAT SERPL: 9 (ref 12–20)
CALCIUM SERPL-MCNC: 9 MG/DL (ref 8.5–10.1)
CHLORIDE SERPL-SCNC: 104 MMOL/L (ref 100–111)
CO2 SERPL-SCNC: 31 MMOL/L (ref 21–32)
COLOR UR: YELLOW
CREAT SERPL-MCNC: 1.32 MG/DL (ref 0.6–1.3)
DIFFERENTIAL METHOD BLD: ABNORMAL
EOSINOPHIL # BLD: 0.2 K/UL (ref 0–0.4)
EOSINOPHIL NFR BLD: 4 % (ref 0–5)
EPITH CASTS URNS QL MICRO: ABNORMAL /LPF (ref 0–5)
ERYTHROCYTE [DISTWIDTH] IN BLOOD BY AUTOMATED COUNT: 13.2 % (ref 11.6–14.5)
GLOBULIN SER CALC-MCNC: 4.9 G/DL (ref 2–4)
GLUCOSE SERPL-MCNC: 114 MG/DL (ref 74–99)
GLUCOSE UR STRIP.AUTO-MCNC: NEGATIVE MG/DL
HCT VFR BLD AUTO: 44 % (ref 36–48)
HEMOCCULT STL QL: NEGATIVE
HGB BLD-MCNC: 14.1 G/DL (ref 13–16)
HGB UR QL STRIP: NEGATIVE
IMM GRANULOCYTES # BLD AUTO: 0 K/UL (ref 0–0.04)
IMM GRANULOCYTES NFR BLD AUTO: 0 % (ref 0–0.5)
KETONES UR QL STRIP.AUTO: NEGATIVE MG/DL
LEUKOCYTE ESTERASE UR QL STRIP.AUTO: NEGATIVE
LIPASE SERPL-CCNC: 238 U/L (ref 73–393)
LYMPHOCYTES # BLD: 2.2 K/UL (ref 0.9–3.6)
LYMPHOCYTES NFR BLD: 42 % (ref 21–52)
MCH RBC QN AUTO: 28 PG (ref 24–34)
MCHC RBC AUTO-ENTMCNC: 32 G/DL (ref 31–37)
MCV RBC AUTO: 87.3 FL (ref 78–100)
MONOCYTES # BLD: 0.7 K/UL (ref 0.05–1.2)
MONOCYTES NFR BLD: 14 % (ref 3–10)
MUCOUS THREADS URNS QL MICRO: ABNORMAL /LPF
NEUTS SEG # BLD: 2.1 K/UL (ref 1.8–8)
NEUTS SEG NFR BLD: 40 % (ref 40–73)
NITRITE UR QL STRIP.AUTO: NEGATIVE
NRBC # BLD: 0 K/UL (ref 0–0.01)
NRBC BLD-RTO: 0 PER 100 WBC
PH UR STRIP: >8.5 [PH] (ref 5–8)
PLATELET # BLD AUTO: 274 K/UL (ref 135–420)
PMV BLD AUTO: 10.7 FL (ref 9.2–11.8)
POTASSIUM SERPL-SCNC: 4.6 MMOL/L (ref 3.5–5.5)
PROT SERPL-MCNC: 8.5 G/DL (ref 6.4–8.2)
PROT UR STRIP-MCNC: ABNORMAL MG/DL
RBC # BLD AUTO: 5.04 M/UL (ref 4.35–5.65)
RBC #/AREA URNS HPF: ABNORMAL /HPF (ref 0–5)
SODIUM SERPL-SCNC: 137 MMOL/L (ref 136–145)
SP GR UR REFRACTOMETRY: 1.01 (ref 1–1.03)
SPECIMEN EXP DATE BLD: NORMAL
UROBILINOGEN UR QL STRIP.AUTO: 0.2 EU/DL (ref 0.2–1)
WBC # BLD AUTO: 5.2 K/UL (ref 4.6–13.2)
WBC URNS QL MICRO: ABNORMAL /HPF (ref 0–5)

## 2022-05-07 PROCEDURE — 82272 OCCULT BLD FECES 1-3 TESTS: CPT

## 2022-05-07 PROCEDURE — 96365 THER/PROPH/DIAG IV INF INIT: CPT

## 2022-05-07 PROCEDURE — 80053 COMPREHEN METABOLIC PANEL: CPT

## 2022-05-07 PROCEDURE — 74011000258 HC RX REV CODE- 258: Performed by: EMERGENCY MEDICINE

## 2022-05-07 PROCEDURE — 74011000636 HC RX REV CODE- 636: Performed by: EMERGENCY MEDICINE

## 2022-05-07 PROCEDURE — 83690 ASSAY OF LIPASE: CPT

## 2022-05-07 PROCEDURE — 81001 URINALYSIS AUTO W/SCOPE: CPT

## 2022-05-07 PROCEDURE — 93005 ELECTROCARDIOGRAM TRACING: CPT

## 2022-05-07 PROCEDURE — 99285 EMERGENCY DEPT VISIT HI MDM: CPT

## 2022-05-07 PROCEDURE — 86900 BLOOD TYPING SEROLOGIC ABO: CPT

## 2022-05-07 PROCEDURE — 96366 THER/PROPH/DIAG IV INF ADDON: CPT

## 2022-05-07 PROCEDURE — 74011250636 HC RX REV CODE- 250/636: Performed by: EMERGENCY MEDICINE

## 2022-05-07 PROCEDURE — 96376 TX/PRO/DX INJ SAME DRUG ADON: CPT

## 2022-05-07 PROCEDURE — 85025 COMPLETE CBC W/AUTO DIFF WBC: CPT

## 2022-05-07 PROCEDURE — 96375 TX/PRO/DX INJ NEW DRUG ADDON: CPT

## 2022-05-07 PROCEDURE — 74177 CT ABD & PELVIS W/CONTRAST: CPT

## 2022-05-07 RX ORDER — DICYCLOMINE HYDROCHLORIDE 20 MG/1
20 TABLET ORAL EVERY 6 HOURS
Qty: 12 TABLET | Refills: 0 | Status: SHIPPED | OUTPATIENT
Start: 2022-05-07 | End: 2022-05-07 | Stop reason: SDUPTHER

## 2022-05-07 RX ORDER — DICYCLOMINE HYDROCHLORIDE 20 MG/1
20 TABLET ORAL EVERY 6 HOURS
Qty: 12 TABLET | Refills: 0 | OUTPATIENT
Start: 2022-05-07 | End: 2022-07-22

## 2022-05-07 RX ORDER — ONDANSETRON 4 MG/1
4 TABLET, ORALLY DISINTEGRATING ORAL
Qty: 20 TABLET | Refills: 0 | OUTPATIENT
Start: 2022-05-07 | End: 2022-07-22

## 2022-05-07 RX ORDER — MORPHINE SULFATE 4 MG/ML
4 INJECTION INTRAVENOUS ONCE
Status: COMPLETED | OUTPATIENT
Start: 2022-05-07 | End: 2022-05-07

## 2022-05-07 RX ORDER — CIPROFLOXACIN 500 MG/1
500 TABLET ORAL 2 TIMES DAILY
Qty: 20 TABLET | Refills: 0 | Status: SHIPPED | OUTPATIENT
Start: 2022-05-07 | End: 2022-05-07 | Stop reason: SDUPTHER

## 2022-05-07 RX ORDER — METRONIDAZOLE 500 MG/1
500 TABLET ORAL 3 TIMES DAILY
Qty: 30 TABLET | Refills: 0 | Status: SHIPPED | OUTPATIENT
Start: 2022-05-07 | End: 2022-05-17

## 2022-05-07 RX ORDER — CIPROFLOXACIN 500 MG/1
500 TABLET ORAL 2 TIMES DAILY
Qty: 20 TABLET | Refills: 0 | Status: SHIPPED | OUTPATIENT
Start: 2022-05-07 | End: 2022-05-17

## 2022-05-07 RX ORDER — METRONIDAZOLE 500 MG/1
500 TABLET ORAL 3 TIMES DAILY
Qty: 30 TABLET | Refills: 0 | Status: SHIPPED | OUTPATIENT
Start: 2022-05-07 | End: 2022-05-07 | Stop reason: SDUPTHER

## 2022-05-07 RX ORDER — ONDANSETRON 4 MG/1
4 TABLET, ORALLY DISINTEGRATING ORAL
Qty: 20 TABLET | Refills: 0 | Status: SHIPPED | OUTPATIENT
Start: 2022-05-07 | End: 2022-05-07 | Stop reason: SDUPTHER

## 2022-05-07 RX ORDER — ONDANSETRON 2 MG/ML
4 INJECTION INTRAMUSCULAR; INTRAVENOUS
Status: COMPLETED | OUTPATIENT
Start: 2022-05-07 | End: 2022-05-07

## 2022-05-07 RX ADMIN — IOPAMIDOL 100 ML: 612 INJECTION, SOLUTION INTRAVENOUS at 08:29

## 2022-05-07 RX ADMIN — ONDANSETRON 4 MG: 2 INJECTION INTRAMUSCULAR; INTRAVENOUS at 05:12

## 2022-05-07 RX ADMIN — MORPHINE SULFATE 4 MG: 4 INJECTION INTRAVENOUS at 07:54

## 2022-05-07 RX ADMIN — MORPHINE SULFATE 4 MG: 4 INJECTION INTRAVENOUS at 10:07

## 2022-05-07 RX ADMIN — PROMETHAZINE HYDROCHLORIDE 12.5 MG: 25 INJECTION INTRAMUSCULAR; INTRAVENOUS at 07:54

## 2022-05-07 RX ADMIN — SODIUM CHLORIDE 1000 ML: 900 INJECTION, SOLUTION INTRAVENOUS at 05:12

## 2022-05-07 NOTE — ED TRIAGE NOTES
Pt presents to ED ambulatory from triage with c/o abd pain, N/V/D for 1 day;   Pt sts he is having BRBPR

## 2022-05-07 NOTE — DISCHARGE INSTRUCTIONS
Return to the ED for worsening symptoms, inability to tolerate oral medications or for other concerns. Drink plenty of fluids.

## 2022-05-07 NOTE — ED NOTES
This RN along with Dr. Edith English attempted to assess patient.  Patient in bathroom at this time will attempt to reassess

## 2022-05-07 NOTE — ED PROVIDER NOTES
EMERGENCY DEPARTMENT HISTORY AND PHYSICAL EXAM    Date: 5/7/2022  Patient Name: Gabbi Ray    History of Presenting Illness     Chief Complaint   Patient presents with    Abdominal Pain       History Provided By: Patient and Patient's Wife     History Luanne Alexander):   7:03 AM  Gabbi Ray is a 27 y.o. male with PMHX of asthma who presents to the emergency department C/O abdominal pain onset last night. Associated sxs include nausea, vomiting, diarrhea, blood in stool (BRBPR). Pt denies any other sxs or complaints. Patient states pain is sharp in the bilateral lower abdomen. Chief Complaint: Abdominal pain  Onset: Last night  Timing:  Acute  Context: Symptoms started spontaneously, symptoms have rapidly worsened since onset  Location: Lower abdomen  Quality: Sharp  Severity: Moderate  Modifying Factors: Nothing makes it better, or worse. Associated Symptoms: Nausea, vomiting, diarrhea, BRBPR    PCP: Helder, MD Jr     Past History         Past Medical History:  Past Medical History:   Diagnosis Date    Asthma        Past Surgical History:  Past Surgical History:   Procedure Laterality Date    HX APPENDECTOMY         Family History:  No family history on file. Reviewed and non-contributory    Social History:  Social History     Tobacco Use    Smoking status: Never Smoker    Smokeless tobacco: Never Used   Substance Use Topics    Alcohol use: Not Currently     Comment: socially     Drug use: Not Currently     Types: Marijuana       Medications: Allergies:  No Known Allergies    Review of Systems      Review of Systems   Constitutional: Negative for chills and fever. HENT: Negative for rhinorrhea and sore throat. Eyes: Negative for pain and visual disturbance. Respiratory: Negative for chest tightness, shortness of breath and wheezing. Cardiovascular: Negative for chest pain and palpitations. Gastrointestinal: Positive for abdominal pain, blood in stool, diarrhea, nausea and vomiting. Musculoskeletal: Negative for arthralgias and myalgias. Skin: Negative for rash and wound. Neurological: Negative for speech difficulty, light-headedness and headaches. Psychiatric/Behavioral: Negative for agitation and confusion. All other systems reviewed and are negative. Physical Exam     Vitals:    05/07/22 0903 05/07/22 0917 05/07/22 1002 05/07/22 1017   BP: (!) 148/81 (!) 154/88 138/86 130/82   Pulse:       Resp:       Temp:       SpO2: 100% 100% 100% 100%   Weight:       Height:           Physical Exam  Vitals and nursing note reviewed. Constitutional:       General: He is not in acute distress. Appearance: Normal appearance. He is normal weight. He is not ill-appearing. HENT:      Head: Normocephalic and atraumatic. Nose: Nose normal. No rhinorrhea. Mouth/Throat:      Mouth: Mucous membranes are moist.      Pharynx: No oropharyngeal exudate or posterior oropharyngeal erythema. Eyes:      Extraocular Movements: Extraocular movements intact. Conjunctiva/sclera: Conjunctivae normal.      Pupils: Pupils are equal, round, and reactive to light. Cardiovascular:      Rate and Rhythm: Normal rate and regular rhythm. Heart sounds: No murmur heard. No friction rub. No gallop. Pulmonary:      Effort: Pulmonary effort is normal. No respiratory distress. Breath sounds: Normal breath sounds. No wheezing, rhonchi or rales. Abdominal:      General: Bowel sounds are normal.      Palpations: Abdomen is soft. Tenderness: There is abdominal tenderness in the right lower quadrant, suprapubic area and left lower quadrant. There is no guarding or rebound. Musculoskeletal:         General: No swelling, tenderness or deformity. Normal range of motion. Cervical back: Normal range of motion and neck supple. No rigidity. Lymphadenopathy:      Cervical: No cervical adenopathy. Skin:     General: Skin is warm and dry. Findings: No rash.    Neurological: General: No focal deficit present. Mental Status: He is alert and oriented to person, place, and time. Psychiatric:         Mood and Affect: Mood normal.         Behavior: Behavior normal.         Diagnostic Study Results     Labs -  Recent Results (from the past 12 hour(s))   CBC WITH AUTOMATED DIFF    Collection Time: 05/07/22  4:54 AM   Result Value Ref Range    WBC 5.2 4.6 - 13.2 K/uL    RBC 5.04 4.35 - 5.65 M/uL    HGB 14.1 13.0 - 16.0 g/dL    HCT 44.0 36.0 - 48.0 %    MCV 87.3 78.0 - 100.0 FL    MCH 28.0 24.0 - 34.0 PG    MCHC 32.0 31.0 - 37.0 g/dL    RDW 13.2 11.6 - 14.5 %    PLATELET 552 424 - 071 K/uL    MPV 10.7 9.2 - 11.8 FL    NRBC 0.0 0  WBC    ABSOLUTE NRBC 0.00 0.00 - 0.01 K/uL    NEUTROPHILS 40 40 - 73 %    LYMPHOCYTES 42 21 - 52 %    MONOCYTES 14 (H) 3 - 10 %    EOSINOPHILS 4 0 - 5 %    BASOPHILS 1 0 - 2 %    IMMATURE GRANULOCYTES 0 0.0 - 0.5 %    ABS. NEUTROPHILS 2.1 1.8 - 8.0 K/UL    ABS. LYMPHOCYTES 2.2 0.9 - 3.6 K/UL    ABS. MONOCYTES 0.7 0.05 - 1.2 K/UL    ABS. EOSINOPHILS 0.2 0.0 - 0.4 K/UL    ABS. BASOPHILS 0.1 0.0 - 0.1 K/UL    ABS. IMM. GRANS. 0.0 0.00 - 0.04 K/UL    DF AUTOMATED     METABOLIC PANEL, COMPREHENSIVE    Collection Time: 05/07/22  4:54 AM   Result Value Ref Range    Sodium 137 136 - 145 mmol/L    Potassium 4.6 3.5 - 5.5 mmol/L    Chloride 104 100 - 111 mmol/L    CO2 31 21 - 32 mmol/L    Anion gap 2 (L) 3.0 - 18 mmol/L    Glucose 114 (H) 74 - 99 mg/dL    BUN 12 7.0 - 18 MG/DL    Creatinine 1.32 (H) 0.6 - 1.3 MG/DL    BUN/Creatinine ratio 9 (L) 12 - 20      GFR est AA >60 >60 ml/min/1.73m2    GFR est non-AA >60 >60 ml/min/1.73m2    Calcium 9.0 8.5 - 10.1 MG/DL    Bilirubin, total 0.3 0.2 - 1.0 MG/DL    ALT (SGPT) 36 16 - 61 U/L    AST (SGOT) 30 10 - 38 U/L    Alk.  phosphatase 99 45 - 117 U/L    Protein, total 8.5 (H) 6.4 - 8.2 g/dL    Albumin 3.6 3.4 - 5.0 g/dL    Globulin 4.9 (H) 2.0 - 4.0 g/dL    A-G Ratio 0.7 (L) 0.8 - 1.7     LIPASE    Collection Time: 05/07/22 4:54 AM   Result Value Ref Range    Lipase 238 73 - 393 U/L   EKG, 12 LEAD, INITIAL    Collection Time: 05/07/22  5:06 AM   Result Value Ref Range    Ventricular Rate 67 BPM    Atrial Rate 67 BPM    P-R Interval 184 ms    QRS Duration 120 ms    Q-T Interval 438 ms    QTC Calculation (Bezet) 462 ms    Calculated P Axis 71 degrees    Calculated R Axis 52 degrees    Calculated T Axis 55 degrees    Diagnosis       Normal sinus rhythm with sinus arrhythmia  Nonspecific intraventricular conduction delay  Borderline ECG  When compared with ECG of 17-OCT-2021 16:31,  No significant change was found     TYPE & SCREEN    Collection Time: 05/07/22  5:13 AM   Result Value Ref Range    Crossmatch Expiration 05/10/2022,2359     ABO/Rh(D) Flonnie Gala POSITIVE     Antibody screen NEG    URINALYSIS W/ RFLX MICROSCOPIC    Collection Time: 05/07/22  7:20 AM   Result Value Ref Range    Color YELLOW      Appearance CLOUDY      Specific gravity 1.014 1.005 - 1.030      pH (UA) >8.5 (H) 5.0 - 8.0    Protein TRACE (A) NEG mg/dL    Glucose Negative NEG mg/dL    Ketone Negative NEG mg/dL    Bilirubin Negative NEG      Blood Negative NEG      Urobilinogen 0.2 0.2 - 1.0 EU/dL    Nitrites Negative NEG      Leukocyte Esterase Negative NEG     URINE MICROSCOPIC ONLY    Collection Time: 05/07/22  7:20 AM   Result Value Ref Range    WBC 0 to 3 0 - 5 /hpf    RBC 0 to 3 0 - 5 /hpf    Epithelial cells FEW 0 - 5 /lpf    Bacteria FEW (A) NEG /hpf    Mucus 1+ (A) NEG /lpf    Amorphous Crystals 2+ (A) NEG   OCCULT BLOOD, STOOL    Collection Time: 05/07/22  7:35 AM   Result Value Ref Range    Occult blood, stool Negative NEG         Radiologic Studies -   CT ABD PELV W CONT   Final Result      1. Mild colonic wall thickening may be due to underdistention versus colitis   which is most commonly infectious or inflammatory in etiology. 2.  Mildly enlarged lymph nodes, likely reactive.                                    CT Results  (Last 48 hours) 05/07/22 0843  CT ABD PELV W CONT Final result    Impression:      1. Mild colonic wall thickening may be due to underdistention versus colitis   which is most commonly infectious or inflammatory in etiology. 2.  Mildly enlarged lymph nodes, likely reactive. Narrative:  EXAM: CT ABD PELV W CONT       CLINICAL INDICATION/HISTORY: bilateral lower quadrant pain, blood in stool, no   external hemorrhoids       COMPARISON: 3/17/2022       TECHNIQUE:   CT of the abdomen and pelvis following intravenous contrast   administration. Coronal and sagittal reformats were generated and reviewed. One or more dose reduction techniques were used on this CT: automated exposure   control, adjustment of the mAs and/or kVp according to patient size, and   iterative reconstruction techniques. The specific techniques used on this CT   exam have been documented in the patient's electronic medical record. Digital   Imaging and Communications in Medicine (DICOM) format image data are available   to nonaffiliated external healthcare facilities or entities on a secure, media   free, reciprocally searchable basis with patient authorization for at least a   12-month period after this study. _______________       FINDINGS:       LOWER THORAX: No global cardiomegaly or pericardial effusion. No acute   pulmonary infiltrate. No pleural effusion. LIVER: No enhancing hepatic mass. The portal veins are patent. BILIARY: Gallbladder unremarkable. No biliary dilation. SPLEEN: Normal.       PANCREAS: Normal.       ADRENALS: Normal.       KIDNEYS: Normal.       GI TRACT: Normal caliber small and large bowel loops. No morphology of bowel   obstruction. Mild wall thickening throughout the compressed distal colon. Prior appendectomy. BLADDER: Normal.        PELVIC ORGANS: No acute abnormality. VASCULATURE: No arterial aneurysm.        LYMPH NODES: Nonspecific mildly enlarged mesenteric and retroperitoneal lymph   nodes measure up to 1.5 cm (axial image 37). OTHER: No free intraperitoneal air. Minimal fluid/stranding in the dependent   pelvis. Small fat-containing umbilical hernia. OSSEOUS STRUCTURES: No acute osseous abnormality. _______________               CXR Results  (Last 48 hours)    None          Medications given in the ED-  Medications   sodium chloride 0.9 % bolus infusion 1,000 mL (0 mL IntraVENous IV Completed 5/7/22 0701)   ondansetron (ZOFRAN) injection 4 mg (4 mg IntraVENous Given 5/7/22 0512)   morphine injection 4 mg (4 mg IntraVENous Given 5/7/22 0754)   promethazine (PHENERGAN) 12.5 mg in 0.9% sodium chloride 50 mL IVPB (0 mg IntraVENous IV Completed 5/7/22 1010)   iopamidoL (ISOVUE 300) 61 % contrast injection 100 mL (100 mL IntraVENous Given 5/7/22 0829)   morphine injection 4 mg (4 mg IntraVENous Given 5/7/22 1007)       Procedures     Procedures    ED Course     I am the first provider for this patient. I reviewed the vital signs, available nursing notes, past medical history, past surgical history, family history and social history. Records Reviewed: Nursing Notes    Cardiac Monitor:  Rate: 61 bpm  Rhythm: sinus rhythm    Pulse Oximetry Analysis - 100% on RA    7:03 AM Initial assessment performed. The patients presenting problems have been discussed, and they are in agreement with the care plan formulated and outlined with them. I have encouraged them to ask questions as they arise throughout their visit. ED Course as of 05/07/22 1039   Sat May 07, 2022   0707 Attempted to evaluate patient. He was in the bathroom. Waited in the room until 7:17, pt was alert in the bathroom and washing his hands.  [JM]      ED Course User Index  [JM] Gabriela Haque MD           Medical Decision Making     Provider Notes (Medical Decision Making):   DDX: Nausea, vomiting, diarrhea, enteritis, peptic ulcer disease, diverticulosis, diverticulitis    Discussion:  27 y.o. male with nausea, vomiting, diarrhea, inflammation seen on CT scan is consistent with an enteritis. We will start patient on typical antibiotic treatment for this. We will also continue with Bentyl and Zofran at home. Patient may follow-up with his primary care doctor or 1 of ours. Patient and family understand agree with this plan. Diagnosis and Disposition     DISCHARGE NOTE:  10:37 AM   Kvng Brine  results have been reviewed with him. He has been counseled regarding his diagnosis, treatment, and plan. He verbally conveys understanding and agreement of the signs, symptoms, diagnosis, treatment and prognosis and additionally agrees to follow up as discussed. He also agrees with the care-plan and conveys that all of his questions have been answered. I have also provided discharge instructions for him that include: educational information regarding their diagnosis and treatment, and list of reasons why they would want to return to the ED prior to their follow-up appointment, should his condition change. He has been provided with education for proper emergency department utilization. CLINICAL IMPRESSION:    1. Abdominal pain, lower    2. Nausea and vomiting, unspecified vomiting type    3. Diarrhea, unspecified type        PLAN:  1. D/C Home  2. Current Discharge Medication List      START taking these medications    Details   ciprofloxacin HCl (Cipro) 500 mg tablet Take 1 Tablet by mouth two (2) times a day for 10 days. Qty: 20 Tablet, Refills: 0  Start date: 5/7/2022, End date: 5/17/2022      metroNIDAZOLE (FlagyL) 500 mg tablet Take 1 Tablet by mouth three (3) times daily for 10 days. Qty: 30 Tablet, Refills: 0  Start date: 5/7/2022, End date: 5/17/2022      ondansetron (ZOFRAN ODT) 4 mg disintegrating tablet Take 1 Tablet by mouth every eight (8) hours as needed for Nausea or Vomiting.   Qty: 20 Tablet, Refills: 0  Start date: 5/7/2022 dicyclomine (BENTYL) 20 mg tablet Take 1 Tablet by mouth every six (6) hours. Qty: 12 Tablet, Refills: 0  Start date: 5/7/2022           3. Follow-up Information     Follow up With Specialties Details Why [address]  Schedule an appointment as soon as possible for a visit  As soon as possible, For follow up from Emergency Department visit. [facility] [address] [facility] [place]  Schedule an appointment as soon as possible for a visit  As soon as possible, For follow up from Emergency Department visit. [address]  Schedule an appointment as soon as possible for a visit  As soon as possible, For follow up from Emergency Department visit. [facility] [id number]  [phone]    [facility] EMERGENCY DEPT Emergency Medicine  As needed; If symptoms worsen [address] [id number]  [address]     Please note that this dictation was completed with [employer], the computer voice recognition software. Quite often unanticipated grammatical, syntax, homophones, and other interpretive errors are inadvertently transcribed by the computer software. Please disregard these errors. Please excuse any errors that have escaped final proofreading.     Comfort Anderson MD

## 2022-05-08 LAB
ATRIAL RATE: 67 BPM
CALCULATED P AXIS, ECG09: 71 DEGREES
CALCULATED R AXIS, ECG10: 52 DEGREES
CALCULATED T AXIS, ECG11: 55 DEGREES
DIAGNOSIS, 93000: NORMAL
P-R INTERVAL, ECG05: 184 MS
Q-T INTERVAL, ECG07: 438 MS
QRS DURATION, ECG06: 120 MS
QTC CALCULATION (BEZET), ECG08: 462 MS
VENTRICULAR RATE, ECG03: 67 BPM

## 2022-05-29 ENCOUNTER — HOSPITAL ENCOUNTER (EMERGENCY)
Age: 32
Discharge: HOME | End: 2022-05-29
Attending: STUDENT IN AN ORGANIZED HEALTH CARE EDUCATION/TRAINING PROGRAM
Payer: COMMERCIAL

## 2022-05-29 VITALS
RESPIRATION RATE: 14 BRPM | SYSTOLIC BLOOD PRESSURE: 115 MMHG | BODY MASS INDEX: 41.3 KG/M2 | TEMPERATURE: 98 F | HEIGHT: 71 IN | WEIGHT: 295 LBS | OXYGEN SATURATION: 100 % | DIASTOLIC BLOOD PRESSURE: 62 MMHG | HEART RATE: 64 BPM

## 2022-05-29 DIAGNOSIS — G89.29 CHRONIC BILATERAL LOW BACK PAIN WITHOUT SCIATICA: Primary | ICD-10-CM

## 2022-05-29 DIAGNOSIS — M54.50 CHRONIC BILATERAL LOW BACK PAIN WITHOUT SCIATICA: Primary | ICD-10-CM

## 2022-05-29 PROCEDURE — 99282 EMERGENCY DEPT VISIT SF MDM: CPT
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
## 2022-05-29 NOTE — ED NOTES
I have reviewed discharge instructions with the patient. The patient verbalized understanding. Patient

## 2022-06-14 ENCOUNTER — HOSPITAL ENCOUNTER (EMERGENCY)
Age: 32
Discharge: HOME | End: 2022-06-14
Attending: STUDENT IN AN ORGANIZED HEALTH CARE EDUCATION/TRAINING PROGRAM
Payer: COMMERCIAL

## 2022-06-14 VITALS
HEART RATE: 91 BPM | DIASTOLIC BLOOD PRESSURE: 78 MMHG | HEIGHT: 71 IN | BODY MASS INDEX: 37.8 KG/M2 | TEMPERATURE: 98.9 F | RESPIRATION RATE: 20 BRPM | WEIGHT: 270 LBS | SYSTOLIC BLOOD PRESSURE: 139 MMHG | OXYGEN SATURATION: 99 %

## 2022-06-14 DIAGNOSIS — Z87.09 HISTORY OF ASTHMA: ICD-10-CM

## 2022-06-14 DIAGNOSIS — Z20.822 SUSPECTED COVID-19 VIRUS INFECTION: ICD-10-CM

## 2022-06-14 DIAGNOSIS — J06.9 VIRAL UPPER RESPIRATORY TRACT INFECTION: Primary | ICD-10-CM

## 2022-06-14 LAB — SARS-COV-2, COV2: NORMAL

## 2022-06-14 PROCEDURE — 99283 EMERGENCY DEPT VISIT LOW MDM: CPT

## 2022-06-14 PROCEDURE — U0003 INFECTIOUS AGENT DETECTION BY NUCLEIC ACID (DNA OR RNA); SEVERE ACUTE RESPIRATORY SYNDROME CORONAVIRUS 2 (SARS-COV-2) (CORONAVIRUS DISEASE [COVID-19]), AMPLIFIED PROBE TECHNIQUE, MAKING USE OF HIGH THROUGHPUT TECHNOLOGIES AS DESCRIBED BY CMS-2020-01-R: HCPCS

## 2022-06-14 RX ORDER — NIRMATRELVIR AND RITONAVIR 300-100 MG
KIT ORAL
Qty: 1 BOX | Refills: 0 | OUTPATIENT
Start: 2022-06-14 | End: 2022-07-22

## 2022-06-14 NOTE — ED PROVIDER NOTES
EMERGENCY DEPARTMENT HISTORY AND PHYSICAL EXAM    Date: 6/14/2022  Patient Name: Yan Hernandez    History of Presenting Illness     Chief Complaint   Patient presents with    Fever    Cough    Vomiting         History Provided By: Patient    2:08 PM  Yan Hernandez is a 27 y.o. male with PMHX of asthma who presents to the emergency department C/O cough, congestion, ear pain, headache, diarrhea, nausea and vomiting which began 3 days ago. Had an appoint with his PCP 2 days ago had a negative rapid COVID at that time and was prescribed an albuterol inhaler as well as prednisone which he has not yet started. He is about to start a new job and given his symptoms and loss of taste today, was recommended that he retest for [place], especially since his friend with whom he was around 3 days prior to the onset of his symptoms just tested positive for COVID-19. Patient is not vaccinated gets COVID-19, he did have it without complication 6 months ago. Pt denies abdominal pain, shortness of breath, and any other sxs or complaints. PCP: Helder, MD Jr    Current Outpatient Medications   Medication Sig Dispense Refill    nirmatrelvir-ritonavir (Paxlovid, EUA,) 150 mg x 2- 100 mg tablet Use per pack instructions. 1 Box 0    dicyclomine (BENTYL) 20 mg tablet Take 1 Tablet by mouth every six (6) hours. (Patient not taking: Reported on 5/29/2022) 12 Tablet 0    ondansetron (ZOFRAN ODT) 4 mg disintegrating tablet Take 1 Tablet by mouth every eight (8) hours as needed for Nausea or Vomiting. (Patient not taking: Reported on 5/29/2022) 20 Tablet 0       Past History     Past Medical History:  Past Medical History:   Diagnosis Date    Asthma     Chronic back pain        Past Surgical History:  Past Surgical History:   Procedure Laterality Date    HX APPENDECTOMY         Family History:  No family history on file.     Social History:  Social History     Tobacco Use    Smoking status: Never Smoker    Smokeless tobacco: Never Used   Substance Use Topics    Alcohol use: Not Currently     Comment: socially     Drug use: Not Currently     Types: Marijuana       Allergies:  No Known Allergies      Review of Systems   Review of Systems   Constitutional: Positive for fever. HENT: Positive for congestion, ear pain and sore throat. Respiratory: Positive for cough. Negative for shortness of breath. Gastrointestinal: Positive for diarrhea, nausea and vomiting. All other systems reviewed and are negative. Physical Exam     Vitals:    06/14/22 1400   BP: 139/78   Pulse: 91   Resp: 20   Temp: 98.9 °F (37.2 °C)   SpO2: 99%   Weight: 122.5 kg (270 lb)   Height: 5' 11\" (1.803 m)     Physical Exam  Vital signs and nursing notes reviewed. CONSTITUTIONAL: Alert. Well-appearing; overweight; in no apparent distress. HEAD: Normocephalic; atraumatic. EYES:  Conjunctiva clear. ENT: TM's normal. External ear normal. Normal nose; no rhinorrhea. Normal pharynx. Tonsils not enlarged without exudate. Moist mucus membranes. NECK: Supple; FROM without difficulty, non-tender; no cervical lymphadenopathy. CV: Normal S1, S2; no murmurs, rubs, or gallops. No chest wall tenderness. RESPIRATORY: Normal chest excursion with respiration; breath sounds clear and equal bilaterally; no wheezes, rhonchi, or rales. NEURO: A & O x3. PSYCH:  Mood and affect appropriate. Diagnostic Study Results     Labs -     Recent Results (from the past 12 hour(s))   SARS-COV-2    Collection Time: 06/14/22  2:45 PM   Result Value Ref Range    SARS-CoV-2 by PCR Please find results under separate order         Radiologic Studies -   No orders to display     CT Results  (Last 48 hours)    None        CXR Results  (Last 48 hours)    None          Medications given in the ED-  Medications - No data to display      Medical Decision Making   I am the first provider for this patient.     I reviewed the vital signs, available nursing notes, past medical history, past surgical history, family history and social history. Vital Signs-Reviewed the patient's vital signs. Records Reviewed: Nursing Notes      Procedures:  Procedures    ED Course:  2:08 PM   Initial assessment performed. The patients presenting problems have been discussed, and they are in agreement with the care plan formulated and outlined with them. I have encouraged them to ask questions as they arise throughout their visit. Provider Notes (Medical Decision Making): Amanda Lee is a 27 y.o. male with URI sx, onset 4 days ago, negative COVID test 3 days ago and was prescribed albuterol inhaler and steroid by PCP. However he lost his taste today and friend just tested positive for COVID-19. Therefore suspect COVID-19 as source of his URI. He appears in no distress, not hypoxic and rest of vitals normal.  Given his BMI and history of asthma, patient meets criteria for Paxlovid. CDC isolation guidelines and return precautions discussed. Diagnosis and Disposition       DISCHARGE NOTE:    Joseph Honeycutt  results have been reviewed with him. He has been counseled regarding his diagnosis, treatment, and plan. He verbally conveys understanding and agreement of the signs, symptoms, diagnosis, treatment and prognosis and additionally agrees to follow up as discussed. He also agrees with the care-plan and conveys that all of his questions have been answered. I have also provided discharge instructions for him that include: educational information regarding their diagnosis and treatment, and list of reasons why they would want to return to the ED prior to their follow-up appointment, should his condition change. He has been provided with education for proper emergency department utilization. CLINICAL IMPRESSION:    1. Viral upper respiratory tract infection    2. Suspected COVID-19 virus infection    3. History of asthma        PLAN:  1. D/C Home  2.    Discharge Medication List as of 6/14/2022  2:46 PM      START taking these medications    Details   nirmatrelvir-ritonavir (Paxlovid, EUA,) 150 mg x 2- 100 mg tablet Use per pack instructions. , Normal, Disp-1 Box, R-0         CONTINUE these medications which have NOT CHANGED    Details   dicyclomine (BENTYL) 20 mg tablet Take 1 Tablet by mouth every six (6) hours. , Normal, Disp-12 Tablet, R-0      ondansetron (ZOFRAN ODT) 4 mg disintegrating tablet Take 1 Tablet by mouth every eight (8) hours as needed for Nausea or Vomiting., Normal, Disp-20 Tablet, R-0           3. Follow-up Information     Follow up With Specialties Details Why Contact Info    Your PCP or Urgent Care   As needed     [facility] EMERGENCY DEPT Emergency Medicine  As needed, If symptoms worsen [address] [id number]  [phone]        _______________________________      Please note that this dictation was completed with [employer], the [email or web address] voice recognition software. Quite often unanticipated grammatical, syntax, homophones, and other interpretive errors are inadvertently transcribed by the computer software. Please disregard these errors. Please excuse any errors that have escaped final proofreading.

## 2022-06-14 NOTE — ED NOTES
Pt arrived ambulatory to triage for covid symptoms since Saturday  Pt had positive exposure on Saturday, at home test was negative  Pt is not vaccinated

## 2022-06-15 ENCOUNTER — PATIENT OUTREACH (OUTPATIENT)
Dept: CASE MANAGEMENT | Age: 32
End: 2022-06-15

## 2022-06-15 LAB — SARS-COV-2, NAA: NOT DETECTED

## 2022-06-15 NOTE — PROGRESS NOTES
Ambulatory Care Coordination ED COVID Follow up Call    Challenges to be reviewed by the provider   Additional needs identified to be addressed with provider no  none           Encounter was not routed to provider for escalation. Method of communication with provider : none    Discussed COVID-19 related testing which was pending at this time. Test results were pending. Patient informed of results, if available? pending. Current Symptoms: loss or taste or smell    Reviewed New or Changed Meds: yes    Do you have what you need at home?  Durable Medical Equipment ordered at discharge: None   Home Health/Outpatient orders at discharge: none    Pulse oximeter? no Discussed and confirmed pulse oximeter discharge instructions and when to notify provider or seek emergency care. Patient education provided: Reviewed appropriate site of care based on symptoms and resources available to patient including: when to seek medical attention. Follow up appointment recommended: no. If no appointment scheduled, scheduling offered: no.  No future appointments. Interventions: LPN CC available if assistance is needed  Reviewed discharge instructions, medical action plan and red flags with patient who verbalized understanding. Provided contact information for future needs. Plan for follow-up call in 1-2 days based on severity of symptoms and risk factors.   Plan for next call: follow up test results     John Chua LPN

## 2022-07-06 ENCOUNTER — HOSPITAL ENCOUNTER (EMERGENCY)
Age: 32
Discharge: HOME | End: 2022-07-06
Attending: EMERGENCY MEDICINE
Payer: COMMERCIAL

## 2022-07-06 ENCOUNTER — APPOINTMENT (OUTPATIENT)
Dept: GENERAL RADIOLOGY | Age: 32
End: 2022-07-06
Attending: EMERGENCY MEDICINE
Payer: COMMERCIAL

## 2022-07-06 VITALS
RESPIRATION RATE: 16 BRPM | SYSTOLIC BLOOD PRESSURE: 110 MMHG | HEART RATE: 89 BPM | DIASTOLIC BLOOD PRESSURE: 61 MMHG | TEMPERATURE: 98 F | OXYGEN SATURATION: 99 %

## 2022-07-06 DIAGNOSIS — R04.2 HEMOPTYSIS: Primary | ICD-10-CM

## 2022-07-06 LAB
ALBUMIN SERPL-MCNC: 3.9 G/DL (ref 3.4–5)
ALBUMIN/GLOB SERPL: 1 {RATIO} (ref 0.8–1.7)
ALP SERPL-CCNC: 68 U/L (ref 45–117)
ALT SERPL-CCNC: 35 U/L (ref 16–61)
ANION GAP SERPL CALC-SCNC: 6 MMOL/L (ref 3–18)
AST SERPL-CCNC: 28 U/L (ref 10–38)
BASOPHILS # BLD: 0 K/UL (ref 0–0.1)
BASOPHILS NFR BLD: 1 % (ref 0–2)
BILIRUB SERPL-MCNC: 0.5 MG/DL (ref 0.2–1)
BUN SERPL-MCNC: 14 MG/DL (ref 7–18)
BUN/CREAT SERPL: 11 (ref 12–20)
CALCIUM SERPL-MCNC: 9 MG/DL (ref 8.5–10.1)
CHLORIDE SERPL-SCNC: 105 MMOL/L (ref 100–111)
CO2 SERPL-SCNC: 27 MMOL/L (ref 21–32)
CREAT SERPL-MCNC: 1.24 MG/DL (ref 0.6–1.3)
D DIMER PPP FEU-MCNC: 0.32 UG/ML(FEU)
DIFFERENTIAL METHOD BLD: ABNORMAL
EOSINOPHIL # BLD: 0.2 K/UL (ref 0–0.4)
EOSINOPHIL NFR BLD: 3 % (ref 0–5)
ERYTHROCYTE [DISTWIDTH] IN BLOOD BY AUTOMATED COUNT: 13.2 % (ref 11.6–14.5)
GLOBULIN SER CALC-MCNC: 4 G/DL (ref 2–4)
GLUCOSE SERPL-MCNC: 87 MG/DL (ref 74–99)
HCT VFR BLD AUTO: 39.7 % (ref 36–48)
HGB BLD-MCNC: 13.2 G/DL (ref 13–16)
IMM GRANULOCYTES # BLD AUTO: 0 K/UL (ref 0–0.04)
IMM GRANULOCYTES NFR BLD AUTO: 0 % (ref 0–0.5)
INR PPP: 1 (ref 0.8–1.2)
LYMPHOCYTES # BLD: 2.6 K/UL (ref 0.9–3.6)
LYMPHOCYTES NFR BLD: 50 % (ref 21–52)
MCH RBC QN AUTO: 28.4 PG (ref 24–34)
MCHC RBC AUTO-ENTMCNC: 33.2 G/DL (ref 31–37)
MCV RBC AUTO: 85.4 FL (ref 78–100)
MONOCYTES # BLD: 0.5 K/UL (ref 0.05–1.2)
MONOCYTES NFR BLD: 10 % (ref 3–10)
NEUTS SEG # BLD: 2 K/UL (ref 1.8–8)
NEUTS SEG NFR BLD: 37 % (ref 40–73)
NRBC # BLD: 0 K/UL (ref 0–0.01)
NRBC BLD-RTO: 0 PER 100 WBC
PLATELET # BLD AUTO: 256 K/UL (ref 135–420)
PMV BLD AUTO: 10.6 FL (ref 9.2–11.8)
POTASSIUM SERPL-SCNC: 3.6 MMOL/L (ref 3.5–5.5)
PROT SERPL-MCNC: 7.9 G/DL (ref 6.4–8.2)
PROTHROMBIN TIME: 13.5 SEC (ref 11.5–15.2)
RBC # BLD AUTO: 4.65 M/UL (ref 4.35–5.65)
SODIUM SERPL-SCNC: 138 MMOL/L (ref 136–145)
TROPONIN-HIGH SENSITIVITY: 3 NG/L (ref 0–78)
WBC # BLD AUTO: 5.3 K/UL (ref 4.6–13.2)

## 2022-07-06 PROCEDURE — 85025 COMPLETE CBC W/AUTO DIFF WBC: CPT

## 2022-07-06 PROCEDURE — 85379 FIBRIN DEGRADATION QUANT: CPT

## 2022-07-06 PROCEDURE — 80053 COMPREHEN METABOLIC PANEL: CPT

## 2022-07-06 PROCEDURE — 99285 EMERGENCY DEPT VISIT HI MDM: CPT

## 2022-07-06 PROCEDURE — 84484 ASSAY OF TROPONIN QUANT: CPT

## 2022-07-06 PROCEDURE — 85610 PROTHROMBIN TIME: CPT

## 2022-07-06 PROCEDURE — 71046 X-RAY EXAM CHEST 2 VIEWS: CPT

## 2022-07-06 PROCEDURE — 93005 ELECTROCARDIOGRAM TRACING: CPT

## 2022-07-07 NOTE — ED PROVIDER NOTES
79-year-old male past medical history of asthma presents the emergency department with hemoptysis. Patient states 3 weeks ago he was tested for COVID to the urgent care center because he did not feel well cough fevers chills. He was given medication for COVID which she did not take. He came here and was tested and was negative. Patient now for 2-week is has had a cough. It is episodic nothing makes it better or worse. For the past 3 days he has noticed he is coughed up some blood. No clots. Today it was worse it came from his mouth and his nose. Patient stated this happened at 8:00 this morning. No other issues expressed. Past Medical History:   Diagnosis Date    Asthma     Chronic back pain        Past Surgical History:   Procedure Laterality Date    HX APPENDECTOMY           No family history on file. Social History     Socioeconomic History    Marital status: SINGLE     Spouse name: Not on file    Number of children: Not on file    Years of education: Not on file    Highest education level: Not on file   Occupational History    Not on file   Tobacco Use    Smoking status: Never Smoker    Smokeless tobacco: Never Used   Substance and Sexual Activity    Alcohol use: Not Currently     Comment: socially     Drug use: Not Currently     Types: Marijuana    Sexual activity: Not on file   Other Topics Concern    Not on file   Social History Narrative    Not on file     Social Determinants of Health     Financial Resource Strain:     Difficulty of Paying Living Expenses: Not on file   Food Insecurity:     Worried About Running Out of Food in the Last Year: Not on file    Kris of Food in the Last Year: Not on file   Transportation Needs:     Lack of Transportation (Medical): Not on file    Lack of Transportation (Non-Medical):  Not on file   Physical Activity:     Days of Exercise per Week: Not on file    Minutes of Exercise per Session: Not on file   Stress:     Feeling of Stress : Not on file   Social Connections:     Frequency of Communication with Friends and Family: Not on file    Frequency of Social Gatherings with Friends and Family: Not on file    Attends Islam Services: Not on file    Active Member of Clubs or Organizations: Not on file    Attends Club or Organization Meetings: Not on file    Marital Status: Not on file   Intimate Partner Violence:     Fear of Current or Ex-Partner: Not on file    Emotionally Abused: Not on file    Physically Abused: Not on file    Sexually Abused: Not on file   Housing Stability:     Unable to Pay for Housing in the Last Year: Not on file    Number of [place] in the Last Year: Not on file    Unstable Housing in the Last Year: Not on file         ALLERGIES: Patient has no known allergies. Review of Systems   Constitutional: Negative. HENT: Negative. Eyes: Negative. Respiratory: Positive for cough and shortness of breath. Cardiovascular: Positive for chest pain. Genitourinary: Negative. Musculoskeletal: Negative. Skin: Negative. Neurological: Negative. Hematological: Negative. All other systems reviewed and are negative. Vitals:    07/06/22 2117   BP: 116/67   Pulse: 89   Resp: 16   Temp: 98 °F (36.7 °C)   SpO2: 100%            Physical Exam  Vitals and nursing note reviewed. Constitutional:       Appearance: He is well-developed. HENT:      Head: Normocephalic and atraumatic. Nose: Nose normal.      Mouth/Throat:      Mouth: Mucous membranes are moist.   Eyes:      General: No scleral icterus. Right eye: No discharge. Left eye: No discharge. Conjunctiva/sclera: Conjunctivae normal.      Pupils: Pupils are equal, round, and reactive to light. Neck:      Thyroid: No thyromegaly. Vascular: No JVD. Trachea: No tracheal deviation. Cardiovascular:      Rate and Rhythm: Normal rate and regular rhythm. Heart sounds: Normal heart sounds.  No murmur heard.  No friction rub. No gallop. Pulmonary:      Effort: Pulmonary effort is normal. No respiratory distress. Breath sounds: Normal breath sounds. No stridor. No wheezing or rales. Chest:      Chest wall: No tenderness. Abdominal:      General: There is no distension. Palpations: Abdomen is soft. There is no mass. Tenderness: There is no abdominal tenderness. There is no guarding or rebound. Musculoskeletal:         General: No tenderness. Normal range of motion. Cervical back: Normal range of motion and neck supple. Lymphadenopathy:      Cervical: No cervical adenopathy. Skin:     General: Skin is warm and dry. Coloration: Skin is not pale. Findings: No erythema or rash. Neurological:      Mental Status: He is alert and oriented to person, place, and time. Cranial Nerves: No cranial nerve deficit. Motor: No abnormal muscle tone. Coordination: Coordination normal.      Deep Tendon Reflexes: Reflexes normal.   Psychiatric:         Behavior: Behavior normal.         Thought Content: Thought content normal.         Judgment: Judgment normal.          MDM  Number of Diagnoses or Management Options  Diagnosis management comments: 1year-old male relatively healthy presents with cough for 3 weeks and hemoptysis. Chest x-ray is negative blood count is within normal limits will discharge home to PCP.          Procedures

## 2022-07-07 NOTE — ED TRIAGE NOTES
C/o having a cough for a week and for 2 days pt states he has been coughing up blood and having sob in periods.  Vss nad noted

## 2022-07-10 LAB
ATRIAL RATE: 78 BPM
CALCULATED P AXIS, ECG09: 72 DEGREES
CALCULATED R AXIS, ECG10: 68 DEGREES
CALCULATED T AXIS, ECG11: 50 DEGREES
DIAGNOSIS, 93000: NORMAL
P-R INTERVAL, ECG05: 172 MS
Q-T INTERVAL, ECG07: 394 MS
QRS DURATION, ECG06: 122 MS
QTC CALCULATION (BEZET), ECG08: 449 MS
VENTRICULAR RATE, ECG03: 78 BPM

## 2022-07-13 ENCOUNTER — PATIENT OUTREACH (OUTPATIENT)
Dept: CASE MANAGEMENT | Age: 32
End: 2022-07-13

## 2022-07-13 NOTE — PROGRESS NOTES
Patient resolved from [address] Transitions episode on 7/13/2022. Patient currently reports that the following symptoms have improved:  no new symptoms and no worsening symptoms. Patient will follow up with pulmonary per ED recommendation    No further outreach scheduled with this LPN CC. Episode of Care resolved. Patient has this LPN CC contact information if future needs arise.

## 2022-07-22 ENCOUNTER — APPOINTMENT (OUTPATIENT)
Dept: GENERAL RADIOLOGY | Age: 32
End: 2022-07-22
Attending: EMERGENCY MEDICINE
Payer: COMMERCIAL

## 2022-07-22 ENCOUNTER — HOSPITAL ENCOUNTER (EMERGENCY)
Age: 32
Discharge: HOME | End: 2022-07-22
Attending: EMERGENCY MEDICINE
Payer: COMMERCIAL

## 2022-07-22 VITALS
BODY MASS INDEX: 37.66 KG/M2 | DIASTOLIC BLOOD PRESSURE: 90 MMHG | WEIGHT: 270 LBS | TEMPERATURE: 98 F | RESPIRATION RATE: 15 BRPM | HEART RATE: 98 BPM | OXYGEN SATURATION: 99 % | SYSTOLIC BLOOD PRESSURE: 141 MMHG

## 2022-07-22 DIAGNOSIS — S61.210A LACERATION OF RIGHT INDEX FINGER WITHOUT FOREIGN BODY WITHOUT DAMAGE TO NAIL, INITIAL ENCOUNTER: Primary | ICD-10-CM

## 2022-07-22 PROCEDURE — 73130 X-RAY EXAM OF HAND: CPT

## 2022-07-22 PROCEDURE — 90471 IMMUNIZATION ADMIN: CPT

## 2022-07-22 PROCEDURE — 99284 EMERGENCY DEPT VISIT MOD MDM: CPT

## 2022-07-22 PROCEDURE — 75810000293 HC SIMP/SUPERF WND  RPR

## 2022-07-22 PROCEDURE — 90715 TDAP VACCINE 7 YRS/> IM: CPT | Performed by: PHYSICIAN ASSISTANT

## 2022-07-22 PROCEDURE — 74011250636 HC RX REV CODE- 250/636: Performed by: PHYSICIAN ASSISTANT

## 2022-07-22 RX ADMIN — TETANUS TOXOID, REDUCED DIPHTHERIA TOXOID AND ACELLULAR PERTUSSIS VACCINE, ADSORBED 0.5 ML: 5; 2.5; 8; 8; 2.5 SUSPENSION INTRAMUSCULAR at 11:15

## 2022-07-22 NOTE — DISCHARGE INSTRUCTIONS
Allow adhesive to peel off on its own  Finger splint  Watch for infection. Redness, swelling, worsening pain.   If so recheck by PCP or ER  Work note  Today you received an updated tetanus shot

## 2022-07-22 NOTE — ED PROVIDER NOTES
EMERGENCY DEPARTMENT HISTORY AND PHYSICAL EXAM    Date: 7/22/2022  Patient Name: Micah Hartley    History of Presenting Illness     Time Seen:10:57 AM    Chief Complaint   Patient presents with    Laceration       History Provided By: Patient    Additional History (Context):   Micah Hartley is a 27 y.o. male who presents to the emergency room with c/o laceration to the right index finger (dominant hand). Patient was lifting up a metal appliance when it cut his finger. This occurred at work. Sustained a small laceration to the tip of the index finger. Bleeding under control. Has minimal discomfort. No other hand injury. States needs tetanus shot. PCP: Helder, MD Jr    REM      Past History     Past Medical History:  Past Medical History:   Diagnosis Date    Asthma     Chronic back pain        Past Surgical History:  Past Surgical History:   Procedure Laterality Date    HX APPENDECTOMY         Family History:  History reviewed. No pertinent family history. Social History:  Social History     Tobacco Use    Smoking status: Never    Smokeless tobacco: Never   Substance Use Topics    Alcohol use: Not Currently     Comment: socially     Drug use: Not Currently     Types: Marijuana       Allergies:  No Known Allergies      Review of Systems   Review of Systems   Musculoskeletal:         Right index finger injury/pain/laceration   Skin:  Positive for wound. Neurological:  Negative for weakness and numbness. All other systems reviewed and are negative. Physical Exam     Vitals:    07/22/22 0957   BP: (!) 141/90   Pulse: 98   Resp: 15   Temp: 98 °F (36.7 °C)   SpO2: 99%   Weight: 122.5 kg (270 lb)     Physical Exam  Vitals and nursing note reviewed. Constitutional:       General: He is not in acute distress. Appearance: Normal appearance. He is well-developed and well-groomed. Comments: Healthy 25-year-old male no apparent distress.   Vital signs show minimally elevated blood pressure otherwise stable   Musculoskeletal:      Right hand: Laceration and tenderness present. No swelling or bony tenderness. Normal range of motion. Normal strength. Normal sensation. Normal capillary refill. Comments: Right hand -index finger with a 1.5 cm V-shaped laceration. Laceration located overlying the distal phalanx and the volar fat pad. No active bleeding. No soft tissue swelling. Minimally tender. Edges already seem to be well approximated. Good range of motion the finger with no deficits. Neurovascular intact distally. Skin:     General: Skin is warm and dry. Capillary Refill: Capillary refill takes less than 2 seconds. Neurological:      Mental Status: He is alert and oriented to person, place, and time. Psychiatric:         Behavior: Behavior is cooperative. Nursing note and vitals reviewed      Diagnostic Study Results     Labs -   No results found for this or any previous visit (from the past 24 hour(s)). Radiologic Studies   XR HAND RT MIN 3 V   Final Result   No acute abnormality identified. No radiopaque foreign body. CT Results  (Last 48 hours)      None          CXR Results  (Last 48 hours)      None              Medical Decision Making   I am the first provider for this patient. I reviewed the vital signs, available nursing notes, past medical history, past surgical history, family history and social history. Vital Signs-Reviewed the patient's vital signs.     Records Reviewed: Nursing Notes    DDX: Right index finger laceration    Procedures:  Wound Closure by Adhesive    Date/Time: 7/22/2022 11:00 AM  Performed by: TAYLOR Lorenzana  Authorized by: TAYLOR Lorenzana     Consent:     Consent obtained:  Verbal    Consent given by:  Patient    Risks, benefits, and alternatives were discussed: yes      Risks discussed:  Infection and pain    Alternatives discussed:  No treatment (Suturing)  Universal protocol:     Procedure explained and questions answered to patient or proxy's satisfaction: yes      Imaging studies available: yes      Immediately prior to procedure, a time out was called: no      Patient identity confirmed:  Verbally with patient  Anesthesia:     Anesthesia method:  None  Laceration details:     Location:  Finger    Finger location:  R index finger    Length (cm):  1.5  Exploration:     Imaging obtained: x-ray      Imaging outcome: foreign body not noted      Wound extent: no nerve damage noted, no tendon damage noted and no underlying fracture noted      Contaminated: no    Treatment:     Area cleansed with:  Povidone-iodine and saline    Amount of cleaning:  Standard    Irrigation solution:  Sterile saline    Irrigation method: soaked. Debridement:  None  Skin repair:     Repair method:  Tissue adhesive (Dermabond)  Repair type:     Repair type:  Simple  Post-procedure details:     Dressing:  Splint for protection    Procedure completion:  Tolerated well, no immediate complications    ED Course:   Initial assessment performed. The patients presenting problems have been discussed, and they are in agreement with the care plan formulated and outlined with them. I have encouraged them to ask questions as they arise throughout their visit. ED Physician Discussion Note:   Edges already well approximated. Do not feel patient would benefit from sutures at this point. Dermabond was placed to seal the wound edges. Tolerated well. No complications. Work note was provided for the next 2 days    See discharge instructions    I, Jorge Ceballos PA-C, was the primarily provider in the care of this patient in the ED      Diagnosis and Disposition       DISCHARGE NOTE:  Mary Joinés Rock  results have been reviewed with him. He has been counseled regarding his diagnosis, treatment, and plan.   He verbally conveys understanding and agreement of the signs, symptoms, diagnosis, treatment and prognosis and additionally agrees to follow up as discussed. He also agrees with the care-plan and conveys that all of his questions have been answered. I have also provided discharge instructions for him that include: educational information regarding their diagnosis and treatment, and list of reasons why they would want to return to the ED prior to their follow-up appointment, should his condition change. He has been provided with education for proper emergency department utilization. CLINICAL IMPRESSION:    1. Laceration of right index finger without foreign body without damage to nail, initial encounter        PLAN:  1. D/C Home  2. There are no discharge medications for this patient. 3.   Follow-up Information       Follow up With Specialties Details Why Contact Info    Primary care provider  Go to  As needed     [facility] EMERGENCY DEPT Emergency Medicine  If symptoms worsen, As needed [address] [address]  [phone]          ____________________________________     Please note that this dictation was completed with [employer], the computer voice recognition software. Quite often unanticipated grammatical, syntax, homophones, and other interpretive errors are inadvertently transcribed by the computer software. Please disregard these errors. Please excuse any errors that have escaped final proofreading.

## 2022-07-22 NOTE — Clinical Note
[facility] [place]  [facility]  [address] [facility] [address] [address] [phone]    Work/School Note    Date: 7/22/2022    To Whom It May concern:    Adalgisa Keen was seen and treated today in the emergency room by the following provider(s):  Attending Provider: David Valenzuela MD  Physician Assistant: TAYLOR Brady.      Adalgisa Keen is excused from work/school on 07/22/22 and 07/23/22. He is medically clear to return to work/school on 7/24/2022.        Sincerely,          TAYLOR Timmons

## 2022-11-24 ENCOUNTER — HOSPITAL ENCOUNTER (EMERGENCY)
Age: 32
Discharge: HOME | End: 2022-11-24
Attending: EMERGENCY MEDICINE
Payer: COMMERCIAL

## 2022-11-24 ENCOUNTER — APPOINTMENT (OUTPATIENT)
Dept: GENERAL RADIOLOGY | Age: 32
End: 2022-11-24
Attending: EMERGENCY MEDICINE
Payer: COMMERCIAL

## 2022-11-24 VITALS
TEMPERATURE: 98 F | RESPIRATION RATE: 16 BRPM | HEART RATE: 83 BPM | WEIGHT: 270 LBS | SYSTOLIC BLOOD PRESSURE: 124 MMHG | BODY MASS INDEX: 37.8 KG/M2 | DIASTOLIC BLOOD PRESSURE: 77 MMHG | HEIGHT: 71 IN | OXYGEN SATURATION: 99 %

## 2022-11-24 DIAGNOSIS — J06.9 UPPER RESPIRATORY TRACT INFECTION, UNSPECIFIED TYPE: ICD-10-CM

## 2022-11-24 DIAGNOSIS — R05.1 ACUTE COUGH: Primary | ICD-10-CM

## 2022-11-24 LAB
ALBUMIN SERPL-MCNC: 3.8 G/DL (ref 3.4–5)
ALBUMIN/GLOB SERPL: 1 {RATIO} (ref 0.8–1.7)
ALP SERPL-CCNC: 70 U/L (ref 45–117)
ALT SERPL-CCNC: 27 U/L (ref 16–61)
ANION GAP SERPL CALC-SCNC: 4 MMOL/L (ref 3–18)
AST SERPL-CCNC: 20 U/L (ref 10–38)
BASOPHILS # BLD: 0 K/UL (ref 0–0.1)
BASOPHILS NFR BLD: 1 % (ref 0–2)
BILIRUB SERPL-MCNC: 0.4 MG/DL (ref 0.2–1)
BUN SERPL-MCNC: 9 MG/DL (ref 7–18)
BUN/CREAT SERPL: 8 (ref 12–20)
CALCIUM SERPL-MCNC: 8.9 MG/DL (ref 8.5–10.1)
CHLORIDE SERPL-SCNC: 107 MMOL/L (ref 100–111)
CO2 SERPL-SCNC: 28 MMOL/L (ref 21–32)
CREAT SERPL-MCNC: 1.15 MG/DL (ref 0.6–1.3)
DIFFERENTIAL METHOD BLD: ABNORMAL
EOSINOPHIL # BLD: 0.2 K/UL (ref 0–0.4)
EOSINOPHIL NFR BLD: 4 % (ref 0–5)
ERYTHROCYTE [DISTWIDTH] IN BLOOD BY AUTOMATED COUNT: 13.5 % (ref 11.6–14.5)
FLUAV RNA SPEC QL NAA+PROBE: NOT DETECTED
FLUBV RNA SPEC QL NAA+PROBE: NOT DETECTED
GLOBULIN SER CALC-MCNC: 3.9 G/DL (ref 2–4)
GLUCOSE SERPL-MCNC: 88 MG/DL (ref 74–99)
HCT VFR BLD AUTO: 40.9 % (ref 36–48)
HGB BLD-MCNC: 13.5 G/DL (ref 13–16)
IMM GRANULOCYTES # BLD AUTO: 0 K/UL (ref 0–0.04)
IMM GRANULOCYTES NFR BLD AUTO: 0 % (ref 0–0.5)
LYMPHOCYTES # BLD: 1.9 K/UL (ref 0.9–3.6)
LYMPHOCYTES NFR BLD: 48 % (ref 21–52)
MAGNESIUM SERPL-MCNC: 2.1 MG/DL (ref 1.6–2.6)
MCH RBC QN AUTO: 28.6 PG (ref 24–34)
MCHC RBC AUTO-ENTMCNC: 33 G/DL (ref 31–37)
MCV RBC AUTO: 86.7 FL (ref 78–100)
MONOCYTES # BLD: 0.4 K/UL (ref 0.05–1.2)
MONOCYTES NFR BLD: 9 % (ref 3–10)
NEUTS SEG # BLD: 1.5 K/UL (ref 1.8–8)
NEUTS SEG NFR BLD: 38 % (ref 40–73)
NRBC # BLD: 0 K/UL (ref 0–0.01)
NRBC BLD-RTO: 0 PER 100 WBC
PLATELET # BLD AUTO: 260 K/UL (ref 135–420)
PMV BLD AUTO: 10.3 FL (ref 9.2–11.8)
POTASSIUM SERPL-SCNC: 4.2 MMOL/L (ref 3.5–5.5)
PROT SERPL-MCNC: 7.7 G/DL (ref 6.4–8.2)
RBC # BLD AUTO: 4.72 M/UL (ref 4.35–5.65)
SARS-COV-2, COV2: NOT DETECTED
SODIUM SERPL-SCNC: 139 MMOL/L (ref 136–145)
TROPONIN-HIGH SENSITIVITY: 4 NG/L (ref 0–78)
WBC # BLD AUTO: 3.9 K/UL (ref 4.6–13.2)

## 2022-11-24 PROCEDURE — 83735 ASSAY OF MAGNESIUM: CPT

## 2022-11-24 PROCEDURE — 87636 SARSCOV2 & INF A&B AMP PRB: CPT

## 2022-11-24 PROCEDURE — 71045 X-RAY EXAM CHEST 1 VIEW: CPT

## 2022-11-24 PROCEDURE — 80053 COMPREHEN METABOLIC PANEL: CPT

## 2022-11-24 PROCEDURE — 85025 COMPLETE CBC W/AUTO DIFF WBC: CPT

## 2022-11-24 PROCEDURE — 99285 EMERGENCY DEPT VISIT HI MDM: CPT

## 2022-11-24 PROCEDURE — 93005 ELECTROCARDIOGRAM TRACING: CPT

## 2022-11-24 PROCEDURE — 84484 ASSAY OF TROPONIN QUANT: CPT

## 2022-11-24 NOTE — DISCHARGE INSTRUCTIONS
Follow-up with your primary care doctor. Return to the ED for worsening symptoms or for other concerns.

## 2022-11-24 NOTE — ED PROVIDER NOTES
EMERGENCY DEPARTMENT HISTORY AND PHYSICAL EXAM    Date: 11/24/2022  Patient Name: Mary Moody    History of Presenting Illness     Chief Complaint   Patient presents with    Cough    Sore Throat    Chest Pain    Vomiting       History Provided By: Patient     History Renata Raman):   11:43 AM  Mary Moody is a 34 y.o. male with a PMHX of asthma  who presents to the emergency department (room 11) C/O chest pain onset yesterday. Associated sxs include cough, sore throat, bilateral arm myalgias, vomiting, shortness of breath. Pt denies any other sxs or complaints. Patient states that he has been coughing since yesterday. He has been coughing so hard that he has been throwing up. He also states he has myalgias in his bilateral arms which make it hard to even move. He is not sure if this was brought on by playing with his friends dog before eating or if it was something else. Chief Complaint: Chest pain  Onset: Yesterday  Timing:  Acute  Context: Symptoms started spontaneously, symptoms have rapidly worsened since onset  Location: Central chest  Quality: Sharp  Severity: Moderate  Modifying Factors: Nothing makes it better, or worse. Associated Symptoms:  Cough, shortness of breath, sore throat, bilateral arm myalgias, vomiting    PCP: Jr Pendleton MD     Past History         Past Medical History:  Past Medical History:   Diagnosis Date    Asthma     Chronic back pain        Past Surgical History:  Past Surgical History:   Procedure Laterality Date    HX APPENDECTOMY         Family History:  History reviewed. No pertinent family history. Reviewed and non-contributory    Social History:  Social History     Tobacco Use    Smoking status: Never    Smokeless tobacco: Never   Substance Use Topics    Alcohol use: Not Currently     Comment: socially     Drug use: Not Currently     Types: Marijuana       Medications:       Allergies:  No Known Allergies    Social Determinants of Health:  Social Determinants of Health Tobacco Use: Low Risk     Smoking Tobacco Use: Never    Smokeless Tobacco Use: Never    Passive Exposure: Not on file   Alcohol Use: Not on file   Financial Resource Strain: Not on file   Food Insecurity: Not on file   Transportation Needs: Not on file   Physical Activity: Not on file   Stress: Not on file   Social Connections: Not on file   Intimate Partner Violence: Not on file   Depression: Not on file   Housing Stability: Not on file       Review of Systems      Review of Systems   Constitutional:  Negative for chills and fever. HENT:  Positive for sore throat. Negative for rhinorrhea. Eyes:  Negative for pain and visual disturbance. Respiratory:  Positive for cough and shortness of breath. Negative for chest tightness and wheezing. Cardiovascular:  Positive for chest pain. Negative for palpitations. Gastrointestinal:  Negative for abdominal pain, diarrhea, nausea and vomiting. Musculoskeletal:  Positive for myalgias. Negative for arthralgias. Skin:  Negative for rash and wound. Neurological:  Negative for speech difficulty, light-headedness and headaches. Psychiatric/Behavioral:  Negative for agitation and confusion. All other systems reviewed and are negative. Physical Exam     Vitals:    11/24/22 1137   BP: 124/77   Pulse: 83   Resp: 16   Temp: 98 °F (36.7 °C)   SpO2: 99%   Weight: 122.5 kg (270 lb)   Height: 5' 11\" (1.803 m)       Physical Exam  Vitals and nursing note reviewed. Constitutional:       General: He is not in acute distress. Appearance: Normal appearance. He is normal weight. He is not ill-appearing. HENT:      Head: Normocephalic and atraumatic. Nose: Nose normal. No rhinorrhea. Mouth/Throat:      Mouth: Mucous membranes are moist.      Pharynx: No oropharyngeal exudate or posterior oropharyngeal erythema. Eyes:      Extraocular Movements: Extraocular movements intact.       Conjunctiva/sclera: Conjunctivae normal.      Pupils: Pupils are equal, round, and reactive to light. Cardiovascular:      Rate and Rhythm: Normal rate and regular rhythm. Heart sounds: No murmur heard. No friction rub. No gallop. Pulmonary:      Effort: Pulmonary effort is normal. No respiratory distress. Breath sounds: Normal breath sounds. No wheezing, rhonchi or rales. Abdominal:      General: Bowel sounds are normal.      Palpations: Abdomen is soft. Tenderness: There is no abdominal tenderness. There is no guarding or rebound. Musculoskeletal:         General: No swelling, tenderness or deformity. Normal range of motion. Cervical back: Normal range of motion and neck supple. No rigidity. Lymphadenopathy:      Cervical: No cervical adenopathy. Skin:     General: Skin is warm and dry. Findings: No rash. Neurological:      General: No focal deficit present. Mental Status: He is alert and oriented to person, place, and time.    Psychiatric:         Mood and Affect: Mood normal.         Behavior: Behavior normal.       Diagnostic Study Results     Labs -  Recent Results (from the past 12 hour(s))   EKG, 12 LEAD, INITIAL    Collection Time: 11/24/22 11:40 AM   Result Value Ref Range    Ventricular Rate 72 BPM    Atrial Rate 72 BPM    P-R Interval 172 ms    QRS Duration 120 ms    Q-T Interval 412 ms    QTC Calculation (Bezet) 451 ms    Calculated P Axis 80 degrees    Calculated R Axis 73 degrees    Calculated T Axis 46 degrees    Diagnosis       Poor data quality, interpretation may be adversely affected  Normal sinus rhythm  Nonspecific intraventricular conduction delay  Borderline ECG  When compared with ECG of 06-JUL-2022 21:45,  No significant change was found     CBC WITH AUTOMATED DIFF    Collection Time: 11/24/22 11:50 AM   Result Value Ref Range    WBC 3.9 (L) 4.6 - 13.2 K/uL    RBC 4.72 4.35 - 5.65 M/uL    HGB 13.5 13.0 - 16.0 g/dL    HCT 40.9 36.0 - 48.0 %    MCV 86.7 78.0 - 100.0 FL    MCH 28.6 24.0 - 34.0 PG    MCHC 33.0 31.0 - 37.0 g/dL    RDW 13.5 11.6 - 14.5 %    PLATELET 483 179 - 067 K/uL    MPV 10.3 9.2 - 11.8 FL    NRBC 0.0 0  WBC    ABSOLUTE NRBC 0.00 0.00 - 0.01 K/uL    NEUTROPHILS 38 (L) 40 - 73 %    LYMPHOCYTES 48 21 - 52 %    MONOCYTES 9 3 - 10 %    EOSINOPHILS 4 0 - 5 %    BASOPHILS 1 0 - 2 %    IMMATURE GRANULOCYTES 0 0.0 - 0.5 %    ABS. NEUTROPHILS 1.5 (L) 1.8 - 8.0 K/UL    ABS. LYMPHOCYTES 1.9 0.9 - 3.6 K/UL    ABS. MONOCYTES 0.4 0.05 - 1.2 K/UL    ABS. EOSINOPHILS 0.2 0.0 - 0.4 K/UL    ABS. BASOPHILS 0.0 0.0 - 0.1 K/UL    ABS. IMM. GRANS. 0.0 0.00 - 0.04 K/UL    DF AUTOMATED     METABOLIC PANEL, COMPREHENSIVE    Collection Time: 11/24/22 11:50 AM   Result Value Ref Range    Sodium 139 136 - 145 mmol/L    Potassium 4.2 3.5 - 5.5 mmol/L    Chloride 107 100 - 111 mmol/L    CO2 28 21 - 32 mmol/L    Anion gap 4 3.0 - 18 mmol/L    Glucose 88 74 - 99 mg/dL    BUN 9 7.0 - 18 MG/DL    Creatinine 1.15 0.6 - 1.3 MG/DL    BUN/Creatinine ratio 8 (L) 12 - 20      eGFR >60 >60 ml/min/1.73m2    Calcium 8.9 8.5 - 10.1 MG/DL    Bilirubin, total 0.4 0.2 - 1.0 MG/DL    ALT (SGPT) 27 16 - 61 U/L    AST (SGOT) 20 10 - 38 U/L    Alk. phosphatase 70 45 - 117 U/L    Protein, total 7.7 6.4 - 8.2 g/dL    Albumin 3.8 3.4 - 5.0 g/dL    Globulin 3.9 2.0 - 4.0 g/dL    A-G Ratio 1.0 0.8 - 1.7     MAGNESIUM    Collection Time: 11/24/22 11:50 AM   Result Value Ref Range    Magnesium 2.1 1.6 - 2.6 mg/dL   TROPONIN-HIGH SENSITIVITY    Collection Time: 11/24/22 11:50 AM   Result Value Ref Range    Troponin-High Sensitivity 4 0 - 78 ng/L   COVID-19 WITH INFLUENZA A/B    Collection Time: 11/24/22 11:52 AM   Result Value Ref Range    SARS-CoV-2 by PCR Not detected NOTD      Influenza A by PCR Not detected NOTD      Influenza B by PCR Not detected NOTD         Radiologic Studies -   XR CHEST PORT   Final Result      No acute process.       _______________           CT Results  (Last 48 hours)      None          CXR Results  (Last 48 hours) 11/24/22 1212  XR CHEST PORT Final result    Impression:      No acute process. _______________           Narrative:  EXAM: XR CHEST PORT. CLINICAL INDICATION/HISTORY: cough, chest pain.   -Additional: None. TECHNIQUE: A portable erect AP radiographic view of the chest.       COMPARISON: Radiographs of 07/06/2022.   _______________       FINDINGS:       Lungs and pleural spaces:   > The lungs are essentially clear.   > No pneumothorax or appreciable significant pleural effusion. Cardiomediastinal silhouette:   > Normal for age. Bones:   > No acute findings. Other:   > None.   _______________                   Medications given in the ED-  Medications - No data to display    Procedures     Procedures    ED Course     I Robby Barbosa MD) am the first provider for this patient. I reviewed the vital signs, available nursing notes, past medical history, past surgical history, family history and social history. Records Reviewed: Nursing Notes    Cardiac Monitor:  Rate: 83 bpm  Rhythm: sinus rhythm    Pulse Oximetry Analysis - 99% on RA    EKG interpretation: (Preliminary)  Rhythm: NSR. Rate: 72 bpm; no STEMI  EKG read by Robby Barbosa MD at 11:40 AM    11:43 AM Initial assessment performed. The patients presenting problems have been discussed, and they are in agreement with the care plan formulated and outlined with them. I have encouraged them to ask questions as they arise throughout their visit. Medical Decision Making     Provider Notes (Medical Decision Making):   DDX: URI, pneumonia, influenza, COVID    Discussion:  34 y.o. male with 2 days of cough and myalgias. Patient had normal troponin, normal EKG, normal chest x-ray, negative influenza and COVID test.  He can follow-up with his primary care doctor. Patient understands and agrees with this plan.       Diagnosis and Disposition     DISCHARGE NOTE:  2:03 PM   Rede Durham  results have been reviewed with him. He has been counseled regarding his diagnosis, treatment, and plan. He verbally conveys understanding and agreement of the signs, symptoms, diagnosis, treatment and prognosis and additionally agrees to follow up as discussed. He also agrees with the care-plan and conveys that all of his questions have been answered. I have also provided discharge instructions for him that include: educational information regarding their diagnosis and treatment, and list of reasons why they would want to return to the ED prior to their follow-up appointment, should his condition change. He has been provided with education for proper emergency department utilization. CLINICAL IMPRESSION:    1. Acute cough    2. Upper respiratory tract infection, unspecified type        PLAN:  1. D/C Home  2. There are no discharge medications for this patient. 3.   Follow-up Information       Follow up With Specialties Details Why Contact Haven Morrow  FAMILY MEDICINE  Schedule an appointment as soon as possible for a visit  As soon as possible, For follow up from Emergency Department visit. [facility] [id number] [facility]  Schedule an appointment as soon as possible for a visit  As soon as possible, For follow up from Emergency Department visit. [facility] [address]  Schedule an appointment as soon as possible for a visit  As soon as possible, For follow up from Emergency Department visit. [facility] [id number] [phone]    [facility]  Schedule an appointment as soon as possible for a visit  As soon as possible, For follow up from Emergency Department visit. [address] [facility]    [address] [facility]   As soon as possible, For follow up from Emergency Department visit.  [address] [facility] [address]  [phone]    [facility] EMERGENCY DEPT Emergency Medicine  As needed; If symptoms worsen [address] [place] [id number] [facility] Candy Osorio MD am the primary clinician of record. Dragon Disclaimer     Please note that this dictation was completed with [employer], the computer voice recognition software. Quite often unanticipated grammatical, syntax, homophones, and other interpretive errors are inadvertently transcribed by the computer software. Please disregard these errors. Please excuse any errors that have escaped final proofreading.     Dianne Osorio MD

## 2022-11-24 NOTE — ED TRIAGE NOTES
Patient reports he has a cough, shortness of breath and chest pain. Throat also hurts.  States he is coughing so hard he is throwing up

## 2022-11-27 LAB
ATRIAL RATE: 72 BPM
CALCULATED P AXIS, ECG09: 80 DEGREES
CALCULATED R AXIS, ECG10: 73 DEGREES
CALCULATED T AXIS, ECG11: 46 DEGREES
DIAGNOSIS, 93000: NORMAL
P-R INTERVAL, ECG05: 172 MS
Q-T INTERVAL, ECG07: 412 MS
QRS DURATION, ECG06: 120 MS
QTC CALCULATION (BEZET), ECG08: 451 MS
VENTRICULAR RATE, ECG03: 72 BPM

## 2023-04-17 ENCOUNTER — HOSPITAL ENCOUNTER (EMERGENCY)
Facility: HOSPITAL | Age: 33
Discharge: HOME | End: 2023-04-17
Attending: EMERGENCY MEDICINE
Payer: COMMERCIAL

## 2023-04-17 VITALS
BODY MASS INDEX: 39.2 KG/M2 | RESPIRATION RATE: 16 BRPM | HEART RATE: 59 BPM | DIASTOLIC BLOOD PRESSURE: 79 MMHG | WEIGHT: 280 LBS | HEIGHT: 71 IN | OXYGEN SATURATION: 99 % | SYSTOLIC BLOOD PRESSURE: 130 MMHG | TEMPERATURE: 97.1 F

## 2023-04-17 DIAGNOSIS — R42 LIGHTHEADED: ICD-10-CM

## 2023-04-17 DIAGNOSIS — G44.319 ACUTE POST-TRAUMATIC HEADACHE, NOT INTRACTABLE: Primary | ICD-10-CM

## 2023-04-17 PROCEDURE — 99283 EMERGENCY DEPT VISIT LOW MDM: CPT | Performed by: EMERGENCY MEDICINE

## 2023-04-17 PROCEDURE — 6370000000 HC RX 637 (ALT 250 FOR IP): Performed by: EMERGENCY MEDICINE

## 2023-04-17 RX ORDER — IBUPROFEN 400 MG/1
800 TABLET ORAL
Status: COMPLETED | OUTPATIENT
Start: 2023-04-17 | End: 2023-04-17

## 2023-04-17 RX ORDER — MECLIZINE HCL 25MG 25 MG/1
25 TABLET, CHEWABLE ORAL 3 TIMES DAILY PRN
Qty: 12 TABLET | Refills: 0 | Status: SHIPPED | OUTPATIENT
Start: 2023-04-17

## 2023-04-17 RX ORDER — MECLIZINE HCL 12.5 MG/1
25 TABLET ORAL
Status: COMPLETED | OUTPATIENT
Start: 2023-04-17 | End: 2023-04-17

## 2023-04-17 RX ADMIN — MECLIZINE 25 MG: 12.5 TABLET ORAL at 08:27

## 2023-04-17 RX ADMIN — IBUPROFEN 800 MG: 400 TABLET ORAL at 08:27

## 2023-04-17 ASSESSMENT — PAIN SCALES - GENERAL: PAINLEVEL_OUTOF10: 9

## 2023-04-17 ASSESSMENT — PAIN DESCRIPTION - LOCATION: LOCATION: HEAD

## 2023-04-17 ASSESSMENT — PAIN - FUNCTIONAL ASSESSMENT: PAIN_FUNCTIONAL_ASSESSMENT: 0-10
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
## 2023-05-03 ENCOUNTER — APPOINTMENT (OUTPATIENT)
Facility: HOSPITAL | Age: 33
End: 2023-05-03
Payer: COMMERCIAL

## 2023-05-03 ENCOUNTER — HOSPITAL ENCOUNTER (EMERGENCY)
Facility: HOSPITAL | Age: 33
Discharge: HOME | End: 2023-05-03
Attending: EMERGENCY MEDICINE
Payer: COMMERCIAL

## 2023-05-03 VITALS
SYSTOLIC BLOOD PRESSURE: 121 MMHG | BODY MASS INDEX: 39.2 KG/M2 | DIASTOLIC BLOOD PRESSURE: 63 MMHG | WEIGHT: 280 LBS | RESPIRATION RATE: 16 BRPM | HEART RATE: 84 BPM | OXYGEN SATURATION: 100 % | HEIGHT: 71 IN | TEMPERATURE: 97.9 F

## 2023-05-03 DIAGNOSIS — S60.10XA SUBUNGUAL HEMATOMA OF DIGIT OF HAND, INITIAL ENCOUNTER: Primary | ICD-10-CM

## 2023-05-03 DIAGNOSIS — Z23 NEED FOR TETANUS BOOSTER: ICD-10-CM

## 2023-05-03 PROCEDURE — 73130 X-RAY EXAM OF HAND: CPT

## 2023-05-03 PROCEDURE — 90714 TD VACC NO PRESV 7 YRS+ IM: CPT | Performed by: PHYSICIAN ASSISTANT

## 2023-05-03 PROCEDURE — 90471 IMMUNIZATION ADMIN: CPT | Performed by: PHYSICIAN ASSISTANT

## 2023-05-03 PROCEDURE — 2500000003 HC RX 250 WO HCPCS: Performed by: PHYSICIAN ASSISTANT

## 2023-05-03 PROCEDURE — 6360000002 HC RX W HCPCS: Performed by: PHYSICIAN ASSISTANT

## 2023-05-03 PROCEDURE — 99284 EMERGENCY DEPT VISIT MOD MDM: CPT

## 2023-05-03 RX ORDER — TETANUS AND DIPHTHERIA TOXOIDS ADSORBED 2; 2 [LF]/.5ML; [LF]/.5ML
0.5 INJECTION INTRAMUSCULAR ONCE
Status: COMPLETED | OUTPATIENT
Start: 2023-05-03 | End: 2023-05-03

## 2023-05-03 RX ORDER — BUPIVACAINE HYDROCHLORIDE 5 MG/ML
30 INJECTION, SOLUTION EPIDURAL; INTRACAUDAL
Status: COMPLETED | OUTPATIENT
Start: 2023-05-03 | End: 2023-05-03

## 2023-05-03 RX ADMIN — TETANUS AND DIPHTHERIA TOXOIDS ADSORBED 0.5 ML: 2; 2 INJECTION INTRAMUSCULAR at 19:31

## 2023-05-03 RX ADMIN — BUPIVACAINE HYDROCHLORIDE 150 MG: 5 INJECTION, SOLUTION EPIDURAL; INTRACAUDAL; PERINEURAL at 19:34

## 2023-05-03 ASSESSMENT — PAIN SCALES - GENERAL: PAINLEVEL_OUTOF10: 10

## 2023-05-03 ASSESSMENT — PAIN - FUNCTIONAL ASSESSMENT: PAIN_FUNCTIONAL_ASSESSMENT: 0-10

## 2023-05-03 NOTE — ED TRIAGE NOTES
Pt arrives ambulatory to ED with c\o RIGHT hand injury s\[facility] arrival, pt is hyperventilating and diaphoretic in triage
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
## 2023-05-30 ENCOUNTER — APPOINTMENT (OUTPATIENT)
Facility: HOSPITAL | Age: 33
End: 2023-05-30
Payer: COMMERCIAL

## 2023-05-30 ENCOUNTER — HOSPITAL ENCOUNTER (EMERGENCY)
Facility: HOSPITAL | Age: 33
Discharge: HOME | End: 2023-05-30
Attending: EMERGENCY MEDICINE
Payer: COMMERCIAL

## 2023-05-30 VITALS
BODY MASS INDEX: 39.2 KG/M2 | SYSTOLIC BLOOD PRESSURE: 136 MMHG | RESPIRATION RATE: 18 BRPM | TEMPERATURE: 97.1 F | HEIGHT: 71 IN | OXYGEN SATURATION: 100 % | HEART RATE: 80 BPM | DIASTOLIC BLOOD PRESSURE: 68 MMHG | WEIGHT: 280 LBS
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
## 2023-05-30 DIAGNOSIS — S43.102A AC SEPARATION, LEFT, INITIAL ENCOUNTER: Primary | ICD-10-CM

## 2023-05-30 DIAGNOSIS — S50.01XA CONTUSION OF RIGHT ELBOW, INITIAL ENCOUNTER: ICD-10-CM

## 2023-05-30 PROCEDURE — 73080 X-RAY EXAM OF ELBOW: CPT

## 2023-05-30 PROCEDURE — 99283 EMERGENCY DEPT VISIT LOW MDM: CPT

## 2023-05-30 PROCEDURE — 73030 X-RAY EXAM OF SHOULDER: CPT

## 2023-05-30 RX ORDER — HYDROCODONE BITARTRATE AND ACETAMINOPHEN 5; 325 MG/1; MG/1
1 TABLET ORAL EVERY 6 HOURS PRN
Qty: 8 TABLET | Refills: 0 | Status: SHIPPED | OUTPATIENT
Start: 2023-05-30 | End: 2023-06-01

## 2023-05-30 RX ORDER — METHOCARBAMOL 500 MG/1
500 TABLET, FILM COATED ORAL 3 TIMES DAILY PRN
Qty: 12 TABLET | Refills: 0 | Status: SHIPPED | OUTPATIENT
Start: 2023-05-30

## 2023-05-30 ASSESSMENT — PAIN SCALES - GENERAL: PAINLEVEL_OUTOF10: 7

## 2023-05-30 ASSESSMENT — PAIN - FUNCTIONAL ASSESSMENT: PAIN_FUNCTIONAL_ASSESSMENT: 0-10
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
## 2023-07-19 ENCOUNTER — HOSPITAL ENCOUNTER (EMERGENCY)
Facility: HOSPITAL | Age: 33
Discharge: HOME | End: 2023-07-19
Attending: EMERGENCY MEDICINE
Payer: COMMERCIAL

## 2023-07-19 ENCOUNTER — APPOINTMENT (OUTPATIENT)
Facility: HOSPITAL | Age: 33
End: 2023-07-19
Payer: COMMERCIAL

## 2023-07-19 VITALS
OXYGEN SATURATION: 100 % | HEIGHT: 71 IN | SYSTOLIC BLOOD PRESSURE: 144 MMHG | WEIGHT: 270 LBS | HEART RATE: 68 BPM | DIASTOLIC BLOOD PRESSURE: 93 MMHG | TEMPERATURE: 98.1 F | RESPIRATION RATE: 17 BRPM | BODY MASS INDEX: 37.8 KG/M2

## 2023-07-19 DIAGNOSIS — R19.7 DIARRHEA, UNSPECIFIED TYPE: Primary | ICD-10-CM

## 2023-07-19 LAB
ALBUMIN SERPL-MCNC: 3.5 G/DL (ref 3.4–5)
ALBUMIN/GLOB SERPL: 0.9 (ref 0.8–1.7)
ALP SERPL-CCNC: 83 U/L (ref 45–117)
ALT SERPL-CCNC: 30 U/L (ref 16–61)
ANION GAP SERPL CALC-SCNC: 3 MMOL/L (ref 3–18)
APPEARANCE UR: CLEAR
AST SERPL-CCNC: 20 U/L (ref 10–38)
BACTERIA URNS QL MICRO: NEGATIVE /HPF
BASOPHILS # BLD: 0.1 K/UL (ref 0–0.1)
BASOPHILS NFR BLD: 1 % (ref 0–2)
BILIRUB SERPL-MCNC: 0.2 MG/DL (ref 0.2–1)
BILIRUB UR QL: NEGATIVE
BUN SERPL-MCNC: 13 MG/DL (ref 7–18)
BUN/CREAT SERPL: 12 (ref 12–20)
CALCIUM SERPL-MCNC: 8.6 MG/DL (ref 8.5–10.1)
CHLORIDE SERPL-SCNC: 107 MMOL/L (ref 100–111)
CO2 SERPL-SCNC: 27 MMOL/L (ref 21–32)
COLOR UR: YELLOW
CREAT SERPL-MCNC: 1.06 MG/DL (ref 0.6–1.3)
DIFFERENTIAL METHOD BLD: NORMAL
EOSINOPHIL # BLD: 0.2 K/UL (ref 0–0.4)
EOSINOPHIL NFR BLD: 3 % (ref 0–5)
EPITH CASTS URNS QL MICRO: NORMAL /LPF (ref 0–5)
ERYTHROCYTE [DISTWIDTH] IN BLOOD BY AUTOMATED COUNT: 13.9 % (ref 11.6–14.5)
GLOBULIN SER CALC-MCNC: 4 G/DL (ref 2–4)
GLUCOSE SERPL-MCNC: 107 MG/DL (ref 74–99)
GLUCOSE UR STRIP.AUTO-MCNC: NEGATIVE MG/DL
HCT VFR BLD AUTO: 40.6 % (ref 36–48)
HGB BLD-MCNC: 13 G/DL (ref 13–16)
HGB UR QL STRIP: ABNORMAL
IMM GRANULOCYTES # BLD AUTO: 0 K/UL (ref 0–0.04)
IMM GRANULOCYTES NFR BLD AUTO: 0 % (ref 0–0.5)
KETONES UR QL STRIP.AUTO: NEGATIVE MG/DL
LEUKOCYTE ESTERASE UR QL STRIP.AUTO: NEGATIVE
LIPASE SERPL-CCNC: 361 U/L (ref 73–393)
LYMPHOCYTES # BLD: 2.4 K/UL (ref 0.9–3.6)
LYMPHOCYTES NFR BLD: 44 % (ref 21–52)
MCH RBC QN AUTO: 28.3 PG (ref 24–34)
MCHC RBC AUTO-ENTMCNC: 32 G/DL (ref 31–37)
MCV RBC AUTO: 88.3 FL (ref 78–100)
MONOCYTES # BLD: 0.5 K/UL (ref 0.05–1.2)
MONOCYTES NFR BLD: 10 % (ref 3–10)
NEUTS SEG # BLD: 2.3 K/UL (ref 1.8–8)
NEUTS SEG NFR BLD: 42 % (ref 40–73)
NITRITE UR QL STRIP.AUTO: NEGATIVE
NRBC # BLD: 0 K/UL (ref 0–0.01)
NRBC BLD-RTO: 0 PER 100 WBC
PH UR STRIP: 5.5 (ref 5–8)
PLATELET # BLD AUTO: 252 K/UL (ref 135–420)
PMV BLD AUTO: 10.8 FL (ref 9.2–11.8)
POTASSIUM SERPL-SCNC: 3.8 MMOL/L (ref 3.5–5.5)
PROT SERPL-MCNC: 7.5 G/DL (ref 6.4–8.2)
PROT UR STRIP-MCNC: NEGATIVE MG/DL
RBC # BLD AUTO: 4.6 M/UL (ref 4.35–5.65)
RBC #/AREA URNS HPF: NORMAL /HPF (ref 0–5)
SODIUM SERPL-SCNC: 137 MMOL/L (ref 136–145)
SP GR UR REFRACTOMETRY: 1.03 (ref 1–1.03)
UROBILINOGEN UR QL STRIP.AUTO: 1 EU/DL (ref 0.2–1)
WBC # BLD AUTO: 5.5 K/UL (ref 4.6–13.2)
WBC URNS QL MICRO: NORMAL /HPF (ref 0–5)

## 2023-07-19 PROCEDURE — 6360000002 HC RX W HCPCS: Performed by: EMERGENCY MEDICINE

## 2023-07-19 PROCEDURE — 83690 ASSAY OF LIPASE: CPT

## 2023-07-19 PROCEDURE — 81001 URINALYSIS AUTO W/SCOPE: CPT

## 2023-07-19 PROCEDURE — 2580000003 HC RX 258: Performed by: EMERGENCY MEDICINE

## 2023-07-19 PROCEDURE — 85025 COMPLETE CBC W/AUTO DIFF WBC: CPT

## 2023-07-19 PROCEDURE — 6370000000 HC RX 637 (ALT 250 FOR IP): Performed by: EMERGENCY MEDICINE

## 2023-07-19 PROCEDURE — 80053 COMPREHEN METABOLIC PANEL: CPT

## 2023-07-19 PROCEDURE — 96374 THER/PROPH/DIAG INJ IV PUSH: CPT

## 2023-07-19 PROCEDURE — 99284 EMERGENCY DEPT VISIT MOD MDM: CPT

## 2023-07-19 RX ORDER — DICYCLOMINE HCL 20 MG
20 TABLET ORAL 3 TIMES DAILY PRN
Qty: 10 TABLET | Refills: 0 | Status: SHIPPED | OUTPATIENT
Start: 2023-07-19

## 2023-07-19 RX ORDER — DICYCLOMINE HCL 20 MG
20 TABLET ORAL
Status: COMPLETED | OUTPATIENT
Start: 2023-07-19 | End: 2023-07-19

## 2023-07-19 RX ORDER — ONDANSETRON 2 MG/ML
4 INJECTION INTRAMUSCULAR; INTRAVENOUS
Status: COMPLETED | OUTPATIENT
Start: 2023-07-19 | End: 2023-07-19

## 2023-07-19 RX ORDER — 0.9 % SODIUM CHLORIDE 0.9 %
1000 INTRAVENOUS SOLUTION INTRAVENOUS ONCE
Status: COMPLETED | OUTPATIENT
Start: 2023-07-19 | End: 2023-07-19

## 2023-07-19 RX ADMIN — ONDANSETRON 4 MG: 2 INJECTION INTRAMUSCULAR; INTRAVENOUS at 05:15

## 2023-07-19 RX ADMIN — DICYCLOMINE HYDROCHLORIDE 20 MG: 20 TABLET ORAL at 05:15

## 2023-07-19 RX ADMIN — SODIUM CHLORIDE 1000 ML: 9 INJECTION, SOLUTION INTRAVENOUS at 05:03

## 2023-07-19 ASSESSMENT — PAIN SCALES - GENERAL: PAINLEVEL_OUTOF10: 0

## 2023-10-09 NOTE — DISCHARGE INSTRUCTIONS
Last OV 09/14   Last refill 08/07 #45 +1RF    Requested Prescriptions     Pending Prescriptions Disp Refills    ALPRAZOLAM 1 MG Oral Tab [Pharmacy Med Name: ALPRAZOLAM 1MG TABLETS] 45 tablet 0     Sig: TAKE 1/2 TABLET(0.5 MG) BY MOUTH THREE TIMES DAILY AS NEEDED FOR ANXIETY OR SLEEP     Future Appointments   Date Time Provider Meredith Jeter   4/53/5480 65:30 AM Geoffrey Lizama MD [facility] Patient Education        Sore Throat: Care Instructions  Your Care Instructions    Infection by bacteria or a virus causes most sore throats. Cigarette smoke, dry air, air pollution, allergies, and yelling can also cause a sore throat. Sore throats can be painful and annoying. Fortunately, most sore throats go away on their own. If you have a bacterial infection, your doctor may prescribe antibiotics. Follow-up care is a key part of your treatment and safety. Be sure to make and go to all appointments, and call your doctor if you are having problems. It's also a good idea to know your test results and keep a list of the medicines you take. How can you care for yourself at home? · If your doctor prescribed antibiotics, take them as directed. Do not stop taking them just because you feel better. You need to take the full course of antibiotics. · Gargle with warm salt water once an hour to help reduce swelling and relieve discomfort. Use 1 teaspoon of salt mixed in 1 cup of warm water. · Take an over-the-counter pain medicine, such as acetaminophen (Tylenol), ibuprofen (Advil, Motrin), or naproxen (Aleve). Read and follow all instructions on the label. · Be careful when taking over-the-counter cold or flu medicines and Tylenol at the same time. Many of these medicines have acetaminophen, which is Tylenol. Read the labels to make sure that you are not taking more than the recommended dose. Too much acetaminophen (Tylenol) can be harmful. · Drink plenty of fluids. Fluids may help soothe an irritated throat. Hot fluids, such as tea or soup, may help decrease throat pain. · Use over-the-counter throat lozenges to soothe pain. Regular cough drops or hard candy may also help. These should not be given to young children because of the risk of choking. · Do not smoke or allow others to smoke around you. If you need help quitting, talk to your doctor about stop-smoking programs and medicines.  These can increase your chances of quitting for good. · Use a vaporizer or humidifier to add moisture to your bedroom. Follow the directions for cleaning the machine. When should you call for help? Call your doctor now or seek immediate medical care if:    · You have new or worse trouble swallowing.     · Your sore throat gets much worse on one side.    Watch closely for changes in your health, and be sure to contact your doctor if you do not get better as expected. Where can you learn more? Go to http://glenn-mayra.info/. Enter [id number] [id number] in the search box to learn more about \"Sore Throat: Care Instructions. \"  Current as of: March 27, 2018  Content Version: 11.9  © 9312-2606 [employer]. Care instructions adapted under license by [employer] (which disclaims liability or warranty for this information). If you have questions about a medical condition or this instruction, always ask your healthcare professional. [place] [address] any warranty or liability for your use of this information. Allergies: Care Instructions  Your Care Instructions    Allergies occur when your body's defense system (immune system) overreacts to certain substances. The immune system treats a harmless substance as if it were a harmful germ or virus. Many things can cause this overreaction, including pollens, medicine, food, dust, animal dander, and mold. Allergies can be mild or severe. Mild allergies can be managed with home treatment. But medicine may be needed to prevent problems. Managing your allergies is an important part of staying healthy. Your doctor may suggest that you have allergy testing to help find out what is causing your allergies. When you know what things trigger your symptoms, you can avoid them. This can prevent allergy symptoms and other health problems.   For severe allergies that cause reactions that affect your whole body (anaphylactic reactions), your doctor may prescribe a shot of epinephrine to carry with you in case you have a severe reaction. Learn how to give yourself the shot and keep it with you at all times. Make sure it is not . Follow-up care is a key part of your treatment and safety. Be sure to make and go to all appointments, and call your doctor if you are having problems. It's also a good idea to know your test results and keep a list of the medicines you take. How can you care for yourself at home? · If you have been told by your doctor that dust or dust mites are causing your allergy, decrease the dust around your bed:  ? Wash sheets, pillowcases, and other bedding in hot water every week. ? Use dust-proof covers for pillows, duvets, and mattresses. Avoid plastic covers because they tear easily and do not \"breathe. \" Wash as instructed on the label. ? Do not use any blankets and pillows that you do not need. ? Use blankets that you can wash in your washing machine. ? Consider removing drapes and carpets, which attract and hold dust, from your bedroom. · If you are allergic to house dust and mites, do not use home humidifiers. Your doctor can suggest ways you can control dust and mites. · Look for signs of cockroaches. Cockroaches cause allergic reactions. Use cockroach baits to get rid of them. Then, clean your home well. Cockroaches like areas where grocery bags, newspapers, empty bottles, or cardboard boxes are stored. Do not keep these inside your home, and keep trash and food containers sealed. Seal off any spots where cockroaches might enter your home. · If you are allergic to mold, get rid of furniture, rugs, and drapes that smell musty. Check for mold in the bathroom. · If you are allergic to outdoor pollen or mold spores, use air-conditioning. Change or clean all filters every month. Keep windows closed. · If you are allergic to pollen, stay inside when pollen counts are high.  Use a vacuum  with a HEPA filter or a double-thickness filter at least two times each week. · Stay inside when air pollution is bad. Avoid paint fumes, perfumes, and other strong odors. · Avoid conditions that make your allergies worse. Stay away from smoke. Do not smoke or let anyone else smoke in your house. Do not use fireplaces or wood-burning stoves. · If you are allergic to your pets, change the air filter in your furnace every month. Use high-efficiency filters. · If you are allergic to pet dander, keep pets outside or out of your bedroom. Old carpet and cloth furniture can hold a lot of animal dander. You may need to replace them. When should you call for help? Give an epinephrine shot if:    · You think you are having a severe allergic reaction.     · You have symptoms in more than one body area, such as mild nausea and an itchy mouth.    After giving an epinephrine shot call 911, even if you feel better.   Call 911 if:    · You have symptoms of a severe allergic reaction. These may include:  ? Sudden raised, red areas (hives) all over your body. ? Swelling of the throat, mouth, lips, or tongue. ? Trouble breathing. ? Passing out (losing consciousness). Or you may feel very lightheaded or suddenly feel weak, confused, or restless.     · You have been given an epinephrine shot, even if you feel better.    Call your doctor now or seek immediate medical care if:    · You have symptoms of an allergic reaction, such as:  ? A rash or hives (raised, red areas on the skin). ? Itching. ? Swelling. ? Belly pain, nausea, or vomiting.    Watch closely for changes in your health, and be sure to contact your doctor if:    · You do not get better as expected. Where can you learn more? Go to http://glenn-myara.info/. Enter F775 in the search box to learn more about \"Allergies: Care Instructions. \"  Current as of: June 27, 2018  Content Version: 11.9  © 7255-0061 [employer].  Care instructions adapted under license by [address] (which disclaims liability or warranty for this information). If you have questions about a medical condition or this instruction, always ask your healthcare professional. [address] any warranty or liability for your use of this information.

## 2024-04-11 ENCOUNTER — TRANSCRIBE ORDERS (OUTPATIENT)
Facility: HOSPITAL | Age: 34
End: 2024-04-11

## 2024-04-11 ENCOUNTER — HOSPITAL ENCOUNTER (OUTPATIENT)
Facility: HOSPITAL | Age: 34
Discharge: HOME | End: 2024-04-11
Payer: MEDICAID

## 2024-04-11 DIAGNOSIS — M79.671 RIGHT FOOT PAIN: Primary | ICD-10-CM

## 2024-04-11 DIAGNOSIS — M21.611 BUNION, RIGHT FOOT: ICD-10-CM

## 2024-04-11 DIAGNOSIS — M79.671 RIGHT FOOT PAIN: ICD-10-CM

## 2024-04-11 LAB
ANION GAP SERPL CALC-SCNC: 4 MMOL/L (ref 3–18)
BUN SERPL-MCNC: 13 MG/DL (ref 7–18)
BUN/CREAT SERPL: 12 (ref 12–20)
CALCIUM SERPL-MCNC: 8.8 MG/DL (ref 8.5–10.1)
CHLORIDE SERPL-SCNC: 106 MMOL/L (ref 100–111)
CO2 SERPL-SCNC: 29 MMOL/L (ref 21–32)
CREAT SERPL-MCNC: 1.13 MG/DL (ref 0.6–1.3)
GLUCOSE SERPL-MCNC: 95 MG/DL (ref 74–99)
HCT VFR BLD AUTO: 42.9 % (ref 36–48)
HGB BLD-MCNC: 13.8 G/DL (ref 13–16)
POTASSIUM SERPL-SCNC: 4.7 MMOL/L (ref 3.5–5.5)
SODIUM SERPL-SCNC: 139 MMOL/L (ref 136–145)

## 2024-04-11 PROCEDURE — 36415 COLL VENOUS BLD VENIPUNCTURE: CPT

## 2024-04-11 PROCEDURE — 80048 BASIC METABOLIC PNL TOTAL CA: CPT

## 2024-04-11 PROCEDURE — 85018 HEMOGLOBIN: CPT

## 2024-04-11 PROCEDURE — 85014 HEMATOCRIT: CPT

## 2024-04-11 PROCEDURE — 93005 ELECTROCARDIOGRAM TRACING: CPT

## 2024-04-13 LAB
EKG ATRIAL RATE: 65 BPM
EKG DIAGNOSIS: NORMAL
EKG P AXIS: 72 DEGREES
EKG P-R INTERVAL: 164 MS
EKG Q-T INTERVAL: 426 MS
EKG QRS DURATION: 122 MS
EKG QTC CALCULATION (BAZETT): 443 MS
EKG R AXIS: 69 DEGREES
EKG T AXIS: 68 DEGREES
EKG VENTRICULAR RATE: 65 BPM

## 2024-04-26 ENCOUNTER — ANESTHESIA (OUTPATIENT)
Facility: HOSPITAL | Age: 34
End: 2024-04-26
Payer: MEDICAID

## 2024-04-26 ENCOUNTER — HOSPITAL ENCOUNTER (OUTPATIENT)
Facility: HOSPITAL | Age: 34
Setting detail: OUTPATIENT SURGERY
Discharge: HOME | End: 2024-04-26
Attending: PODIATRIST | Admitting: PODIATRIST
Payer: MEDICAID

## 2024-04-26 ENCOUNTER — ANESTHESIA EVENT (OUTPATIENT)
Facility: HOSPITAL | Age: 34
End: 2024-04-26
Payer: MEDICAID

## 2024-04-26 VITALS
OXYGEN SATURATION: 97 % | HEART RATE: 78 BPM | BODY MASS INDEX: 44.1 KG/M2 | TEMPERATURE: 97.2 F | DIASTOLIC BLOOD PRESSURE: 85 MMHG | RESPIRATION RATE: 15 BRPM | SYSTOLIC BLOOD PRESSURE: 148 MMHG | WEIGHT: 315 LBS | HEIGHT: 71 IN

## 2024-04-26 DIAGNOSIS — G89.18 POST-OP PAIN: Primary | ICD-10-CM

## 2024-04-26 PROCEDURE — A4217 STERILE WATER/SALINE, 500 ML: HCPCS | Performed by: PODIATRIST

## 2024-04-26 PROCEDURE — 3600000012 HC SURGERY LEVEL 2 ADDTL 15MIN: Performed by: PODIATRIST

## 2024-04-26 PROCEDURE — 7100000010 HC PHASE II RECOVERY - FIRST 15 MIN: Performed by: PODIATRIST

## 2024-04-26 PROCEDURE — 3700000000 HC ANESTHESIA ATTENDED CARE: Performed by: PODIATRIST

## 2024-04-26 PROCEDURE — 6360000002 HC RX W HCPCS: Performed by: PODIATRIST

## 2024-04-26 PROCEDURE — 3600000002 HC SURGERY LEVEL 2 BASE: Performed by: PODIATRIST

## 2024-04-26 PROCEDURE — 7100000000 HC PACU RECOVERY - FIRST 15 MIN: Performed by: PODIATRIST

## 2024-04-26 PROCEDURE — 2709999900 HC NON-CHARGEABLE SUPPLY: Performed by: PODIATRIST

## 2024-04-26 PROCEDURE — 7100000001 HC PACU RECOVERY - ADDTL 15 MIN: Performed by: PODIATRIST

## 2024-04-26 PROCEDURE — 7100000011 HC PHASE II RECOVERY - ADDTL 15 MIN: Performed by: PODIATRIST

## 2024-04-26 PROCEDURE — 2720000010 HC SURG SUPPLY STERILE: Performed by: PODIATRIST

## 2024-04-26 PROCEDURE — 6360000002 HC RX W HCPCS

## 2024-04-26 PROCEDURE — C1713 ANCHOR/SCREW BN/BN,TIS/BN: HCPCS | Performed by: PODIATRIST

## 2024-04-26 PROCEDURE — 2580000003 HC RX 258: Performed by: PODIATRIST

## 2024-04-26 PROCEDURE — 2500000003 HC RX 250 WO HCPCS

## 2024-04-26 PROCEDURE — 3700000001 HC ADD 15 MINUTES (ANESTHESIA): Performed by: PODIATRIST

## 2024-04-26 DEVICE — IMPLANTABLE DEVICE: Type: IMPLANTABLE DEVICE | Site: FOOT | Status: FUNCTIONAL

## 2024-04-26 DEVICE — SCREW BNE L30MM DIA2.5MM MIC FT ANK TI SELF DRL ST CANN: Type: IMPLANTABLE DEVICE | Site: FOOT | Status: FUNCTIONAL

## 2024-04-26 RX ORDER — MEPERIDINE HYDROCHLORIDE 50 MG/ML
12.5 INJECTION INTRAMUSCULAR; INTRAVENOUS; SUBCUTANEOUS EVERY 5 MIN PRN
Status: DISCONTINUED | OUTPATIENT
Start: 2024-04-26 | End: 2024-04-26 | Stop reason: HOSPADM

## 2024-04-26 RX ORDER — ONDANSETRON 2 MG/ML
4 INJECTION INTRAMUSCULAR; INTRAVENOUS
Status: DISCONTINUED | OUTPATIENT
Start: 2024-04-26 | End: 2024-04-26 | Stop reason: HOSPADM

## 2024-04-26 RX ORDER — DEXMEDETOMIDINE HYDROCHLORIDE 100 UG/ML
INJECTION, SOLUTION INTRAVENOUS PRN
Status: DISCONTINUED | OUTPATIENT
Start: 2024-04-26 | End: 2024-04-26 | Stop reason: SDUPTHER

## 2024-04-26 RX ORDER — FENTANYL CITRATE 50 UG/ML
50 INJECTION, SOLUTION INTRAMUSCULAR; INTRAVENOUS EVERY 5 MIN PRN
Status: DISCONTINUED | OUTPATIENT
Start: 2024-04-26 | End: 2024-04-26 | Stop reason: HOSPADM

## 2024-04-26 RX ORDER — OXYCODONE HYDROCHLORIDE 5 MG/1
5 TABLET ORAL PRN
Status: DISCONTINUED | OUTPATIENT
Start: 2024-04-26 | End: 2024-04-26 | Stop reason: HOSPADM

## 2024-04-26 RX ORDER — DEXMEDETOMIDINE HYDROCHLORIDE 100 UG/ML
INJECTION, SOLUTION INTRAVENOUS
Status: COMPLETED
Start: 2024-04-26 | End: 2024-04-26

## 2024-04-26 RX ORDER — BUPIVACAINE HYDROCHLORIDE 5 MG/ML
INJECTION, SOLUTION EPIDURAL; INTRACAUDAL PRN
Status: DISCONTINUED | OUTPATIENT
Start: 2024-04-26 | End: 2024-04-26 | Stop reason: ALTCHOICE

## 2024-04-26 RX ORDER — GLYCOPYRROLATE 0.2 MG/ML
INJECTION INTRAMUSCULAR; INTRAVENOUS PRN
Status: DISCONTINUED | OUTPATIENT
Start: 2024-04-26 | End: 2024-04-26 | Stop reason: SDUPTHER

## 2024-04-26 RX ORDER — ONDANSETRON 4 MG/1
4 TABLET, FILM COATED ORAL 3 TIMES DAILY PRN
Qty: 15 TABLET | Refills: 0 | Status: SHIPPED | OUTPATIENT
Start: 2024-04-26

## 2024-04-26 RX ORDER — PROPOFOL 10 MG/ML
INJECTION, EMULSION INTRAVENOUS PRN
Status: DISCONTINUED | OUTPATIENT
Start: 2024-04-26 | End: 2024-04-26 | Stop reason: SDUPTHER

## 2024-04-26 RX ORDER — SODIUM CHLORIDE 9 MG/ML
INJECTION, SOLUTION INTRAVENOUS PRN
Status: DISCONTINUED | OUTPATIENT
Start: 2024-04-26 | End: 2024-04-26 | Stop reason: HOSPADM

## 2024-04-26 RX ORDER — KETAMINE HCL IN NACL, ISO-OSM 100MG/10ML
SYRINGE (ML) INJECTION PRN
Status: DISCONTINUED | OUTPATIENT
Start: 2024-04-26 | End: 2024-04-26 | Stop reason: SDUPTHER

## 2024-04-26 RX ORDER — MIDAZOLAM HYDROCHLORIDE 2 MG/2ML
2 INJECTION, SOLUTION INTRAMUSCULAR; INTRAVENOUS ONCE
OUTPATIENT
Start: 2024-04-26 | End: 2024-04-26

## 2024-04-26 RX ORDER — SODIUM CHLORIDE 0.9 % (FLUSH) 0.9 %
5-40 SYRINGE (ML) INJECTION EVERY 12 HOURS SCHEDULED
Status: DISCONTINUED | OUTPATIENT
Start: 2024-04-26 | End: 2024-04-26 | Stop reason: HOSPADM

## 2024-04-26 RX ORDER — FENTANYL CITRATE 50 UG/ML
INJECTION, SOLUTION INTRAMUSCULAR; INTRAVENOUS PRN
Status: DISCONTINUED | OUTPATIENT
Start: 2024-04-26 | End: 2024-04-26 | Stop reason: SDUPTHER

## 2024-04-26 RX ORDER — SODIUM CHLORIDE, SODIUM LACTATE, POTASSIUM CHLORIDE, CALCIUM CHLORIDE 600; 310; 30; 20 MG/100ML; MG/100ML; MG/100ML; MG/100ML
INJECTION, SOLUTION INTRAVENOUS CONTINUOUS
Status: DISCONTINUED | OUTPATIENT
Start: 2024-04-26 | End: 2024-04-26 | Stop reason: HOSPADM

## 2024-04-26 RX ORDER — LIDOCAINE HYDROCHLORIDE 20 MG/ML
INJECTION, SOLUTION INTRAVENOUS PRN
Status: DISCONTINUED | OUTPATIENT
Start: 2024-04-26 | End: 2024-04-26 | Stop reason: SDUPTHER

## 2024-04-26 RX ORDER — SUCCINYLCHOLINE/SOD CL,ISO/PF 100 MG/5ML
SYRINGE (ML) INTRAVENOUS PRN
Status: DISCONTINUED | OUTPATIENT
Start: 2024-04-26 | End: 2024-04-26 | Stop reason: SDUPTHER

## 2024-04-26 RX ORDER — PROCHLORPERAZINE EDISYLATE 5 MG/ML
5 INJECTION INTRAMUSCULAR; INTRAVENOUS
Status: DISCONTINUED | OUTPATIENT
Start: 2024-04-26 | End: 2024-04-26 | Stop reason: HOSPADM

## 2024-04-26 RX ORDER — OXYCODONE HYDROCHLORIDE AND ACETAMINOPHEN 5; 325 MG/1; MG/1
1 TABLET ORAL EVERY 6 HOURS PRN
Qty: 28 TABLET | Refills: 0 | Status: SHIPPED | OUTPATIENT
Start: 2024-04-26 | End: 2024-05-03

## 2024-04-26 RX ORDER — ROCURONIUM BROMIDE 10 MG/ML
INJECTION, SOLUTION INTRAVENOUS PRN
Status: DISCONTINUED | OUTPATIENT
Start: 2024-04-26 | End: 2024-04-26 | Stop reason: SDUPTHER

## 2024-04-26 RX ORDER — MIDAZOLAM HYDROCHLORIDE 1 MG/ML
INJECTION INTRAMUSCULAR; INTRAVENOUS PRN
Status: DISCONTINUED | OUTPATIENT
Start: 2024-04-26 | End: 2024-04-26 | Stop reason: SDUPTHER

## 2024-04-26 RX ORDER — OXYCODONE HYDROCHLORIDE 5 MG/1
10 TABLET ORAL PRN
Status: DISCONTINUED | OUTPATIENT
Start: 2024-04-26 | End: 2024-04-26 | Stop reason: HOSPADM

## 2024-04-26 RX ORDER — MIDAZOLAM HYDROCHLORIDE 1 MG/ML
INJECTION INTRAMUSCULAR; INTRAVENOUS
Status: DISCONTINUED
Start: 2024-04-26 | End: 2024-04-26 | Stop reason: WASHOUT

## 2024-04-26 RX ORDER — SODIUM CHLORIDE 0.9 % (FLUSH) 0.9 %
5-40 SYRINGE (ML) INJECTION PRN
Status: DISCONTINUED | OUTPATIENT
Start: 2024-04-26 | End: 2024-04-26 | Stop reason: HOSPADM

## 2024-04-26 RX ORDER — SODIUM CHLORIDE 9 MG/ML
INJECTION, SOLUTION INTRAVENOUS CONTINUOUS
Status: DISCONTINUED | OUTPATIENT
Start: 2024-04-26 | End: 2024-04-26 | Stop reason: HOSPADM

## 2024-04-26 RX ORDER — CLINDAMYCIN PHOSPHATE 900 MG/50ML
900 INJECTION, SOLUTION INTRAVENOUS
Status: COMPLETED | OUTPATIENT
Start: 2024-04-26 | End: 2024-04-26

## 2024-04-26 RX ORDER — HYDROMORPHONE HYDROCHLORIDE 1 MG/ML
0.25 INJECTION, SOLUTION INTRAMUSCULAR; INTRAVENOUS; SUBCUTANEOUS EVERY 5 MIN PRN
Status: DISCONTINUED | OUTPATIENT
Start: 2024-04-26 | End: 2024-04-26 | Stop reason: HOSPADM

## 2024-04-26 RX ORDER — NALOXONE HYDROCHLORIDE 0.4 MG/ML
INJECTION, SOLUTION INTRAMUSCULAR; INTRAVENOUS; SUBCUTANEOUS PRN
Status: DISCONTINUED | OUTPATIENT
Start: 2024-04-26 | End: 2024-04-26 | Stop reason: HOSPADM

## 2024-04-26 RX ADMIN — SODIUM CHLORIDE, POTASSIUM CHLORIDE, SODIUM LACTATE AND CALCIUM CHLORIDE: 600; 310; 30; 20 INJECTION, SOLUTION INTRAVENOUS at 09:15

## 2024-04-26 RX ADMIN — PROPOFOL 150 MG: 10 INJECTION, EMULSION INTRAVENOUS at 13:20

## 2024-04-26 RX ADMIN — ROCURONIUM BROMIDE 30 MG: 10 INJECTION, SOLUTION INTRAVENOUS at 13:39

## 2024-04-26 RX ADMIN — FENTANYL CITRATE 100 MCG: 50 INJECTION, SOLUTION INTRAMUSCULAR; INTRAVENOUS at 15:02

## 2024-04-26 RX ADMIN — LIDOCAINE HYDROCHLORIDE 100 MG: 20 INJECTION, SOLUTION INTRAVENOUS at 12:48

## 2024-04-26 RX ADMIN — DEXMEDETOMIDINE HYDROCHLORIDE 10 MCG: 100 INJECTION, SOLUTION INTRAVENOUS at 12:42

## 2024-04-26 RX ADMIN — Medication 10 MG: at 13:39

## 2024-04-26 RX ADMIN — CLINDAMYCIN PHOSPHATE 900 MG: 900 INJECTION, SOLUTION INTRAVENOUS at 13:16

## 2024-04-26 RX ADMIN — PROPOFOL 100 MCG/KG/MIN: 10 INJECTION, EMULSION INTRAVENOUS at 12:51

## 2024-04-26 RX ADMIN — Medication 200 MG: at 13:20

## 2024-04-26 RX ADMIN — DEXMEDETOMIDINE HYDROCHLORIDE 6 MCG: 100 INJECTION, SOLUTION INTRAVENOUS at 12:51

## 2024-04-26 RX ADMIN — Medication 10 MG: at 13:47

## 2024-04-26 RX ADMIN — DEXMEDETOMIDINE HYDROCHLORIDE 4 MCG: 100 INJECTION, SOLUTION INTRAVENOUS at 12:48

## 2024-04-26 RX ADMIN — PROPOFOL 50 MG: 10 INJECTION, EMULSION INTRAVENOUS at 14:32

## 2024-04-26 RX ADMIN — GLYCOPYRROLATE 0.2 MG: 0.2 INJECTION, SOLUTION INTRAMUSCULAR; INTRAVENOUS at 12:59

## 2024-04-26 RX ADMIN — PROPOFOL 50 MG: 10 INJECTION, EMULSION INTRAVENOUS at 12:50

## 2024-04-26 RX ADMIN — SUGAMMADEX 500 MG: 100 INJECTION, SOLUTION INTRAVENOUS at 14:34

## 2024-04-26 RX ADMIN — MIDAZOLAM 2 MG: 1 INJECTION INTRAMUSCULAR; INTRAVENOUS at 12:42

## 2024-04-26 RX ADMIN — PROPOFOL 100 MG: 10 INJECTION, EMULSION INTRAVENOUS at 12:48

## 2024-04-26 RX ADMIN — Medication 30 MG: at 12:42

## 2024-04-26 RX ADMIN — WATER 500 MG: 1 INJECTION INTRAMUSCULAR; INTRAVENOUS; SUBCUTANEOUS at 12:59

## 2024-04-26 ASSESSMENT — PAIN DESCRIPTION - ORIENTATION: ORIENTATION: RIGHT;LEFT

## 2024-04-26 ASSESSMENT — PAIN DESCRIPTION - LOCATION: LOCATION: FOOT

## 2024-04-26 ASSESSMENT — PAIN SCALES - GENERAL
PAINLEVEL_OUTOF10: 0
PAINLEVEL_OUTOF10: 8

## 2024-04-26 ASSESSMENT — PAIN DESCRIPTION - DESCRIPTORS: DESCRIPTORS: BURNING;SHARP

## 2024-04-26 NOTE — PERIOP NOTE
Reviewed PTA medication list with patient/caregiver and patient/caregiver denies any additional medications.     Patient admits to having a responsible adult care for them at home for at least 24 hours after surgery.    Patient encouraged to use gown warming system and informed that using said warming gown to regulate body temperature prior to a procedure has been shown to help reduce the risks of blood clots and infection.    Patient's pharmacy of choice verified and documented in PTA medication section.    Dual skin assessment & fall risk band verification completed with Jinny CRUZ RN.

## 2024-04-26 NOTE — DISCHARGE INSTRUCTIONS
Post Operative Instructions                                                                 ISIAH Blank DPM        General:  - ELEVATE, ELEVATE, ELEVATE! Elevate the operative leg as much as possible during the first 3-5 days. This will help with healing, pain, and comfort.  - Elevate you leg above the level of your waist. Prop it up on multiple pillows to ensure the leg is cushioned appropriately.  - Apply ice behind the knee 3-4 times a day, 20 minutes on/ 20 minutes off. Do this for the first 2-3 days to help with pain and swelling.  - Use crutches, knee scooter, wheelchair, walker, etc. To ensure that NO WEIGHT IS PUT ON THE OPERATIVE LEG outside of the surgical shoe  - Get plenty of rest.  - On the ride home from the hospital ensure your leg is propped up in the backseat of the car and elevated.    Bandages:   - Keep you bandages clean, dry, and intact.   - Do not remove or change your dressing unless instructed by the surgeons office  - Ensure that the bandages do not get wet while bathing. Use a cast cover or water proof covering to make sure. If the dressings do get wet, you must let the office know and we will change the dressing.Not doing so can cause wound and/or infection.    Medications:  -Take medications as instructed. Typically as follows:  - Percocet: every 4-6 hours as needed for severe pain. Take one of these before bed the night of surgery, even if not having any pain at that time. This will prevent pain in the middle of the night if the anesthesia wears off at that time. Take with food to help prevent nausea.  - Zofran: Take as needed for nausea.  - Do not combine percocet and tylenol, do not combine ketorolac with other NSAIDs such as aspirin or ibuprofen.  - Once percocet is no longer needed, you may begin taking 1000mg Tylenol three times a day for pain, which you may combine with 800mg of Ibuprofen three times a day.  - Call with any questions or  doctor. But problems may occur that cause you to bleed too much in the surgery area.  An injury to a blood vessel can cause bleeding after surgery. Other causes include medicines such as aspirin or anticoagulants (blood thinners).  To help stop the bleeding, your doctor may have put pressure on the incision or sewn up or cauterized (sealed) the incision. Or you may have had surgery to stop bleeding inside the surgery area. Your doctor also may have given you medicines that help stop the bleeding.  How can you care for yourself at home?  If you have strips of tape on the incision, leave the tape on until it falls off. Or follow your doctor's instructions for removing the tape. Keep the area dry at all times.  You will have a dressing over the incision. A dressing helps the incision heal and protects it. Your doctor will tell you how to take care of this.  If you do not have tape on the incision, wash the area daily with warm, soapy water, and pat it dry. Don't use hydrogen peroxide or alcohol, which can slow healing.    When should you call for help?    Call your doctor now or seek immediate medical care if:    You are dizzy or lightheaded, or you feel like you may faint.     You have bleeding that starts again or gets worse, such as soaking one or more bandages over 2 to 4 hours, even after holding pressure on the area.         __________________________________________________________________________________________________________________________________

## 2024-04-26 NOTE — ANESTHESIA PRE PROCEDURE
09:35 PM       HCG (If Applicable): No results found for: \"PREGTESTUR\", \"PREGSERUM\", \"HCG\", \"HCGQUANT\"     ABGs: No results found for: \"PHART\", \"PO2ART\", \"QFA5TXW\", \"UZL1EOY\", \"BEART\", \"L3WBWCTO\"     Type & Screen (If Applicable):  No results found for: \"LABABO\", \"LABRH\"    Drug/Infectious Status (If Applicable):  No results found for: \"HIV\", \"HEPCAB\"    COVID-19 Screening (If Applicable):   Lab Results   Component Value Date/Time    COVID19 Not detected 11/24/2022 11:52 AM           Anesthesia Evaluation    Airway: Mallampati: I  TM distance: >3 FB   Neck ROM: full  Mouth opening: > = 3 FB   Dental: normal exam         Pulmonary:normal exam  breath sounds clear to auscultation      (-) asthma and sleep apnea                           Cardiovascular:Negative CV ROS  Exercise tolerance: good (>4 METS)          Rhythm: regular  Rate: normal           Beta Blocker:  Not on Beta Blocker         Neuro/Psych:   (+) depression/anxiety  (anxiety, no longer takes meds, last took 2 months ago)            GI/Hepatic/Renal:   (+) morbid obesity     (-) GERD       Endo/Other:                     Abdominal:   (+) obese          Vascular:          Other Findings:             Anesthesia Plan      MAC     ASA 3       Induction: intravenous.  continuous noninvasive hemodynamic monitor    Anesthetic plan and risks discussed with patient.      Plan discussed with CRNA.                    Khang Batres MD   4/26/2024

## 2024-04-26 NOTE — INTERVAL H&P NOTE
Update History & Physical    The patient's History and Physical of April 16, 2024 was reviewed with the patient and I examined the patient. There was no change. The surgical site was confirmed by the patient and me.     Plan: The risks, benefits, expected outcome, and alternative to the recommended procedure have been discussed with the patient. Patient understands and wants to proceed with the procedure.     Electronically signed by Esdras Blank DPM on 4/26/2024 at 12:24 PM

## 2024-04-26 NOTE — PERIOP NOTE
TRANSFER - IN REPORT:    Verbal report received from Rosa M RN and Cielo SYKES CRNA on Rui Jauregui  being received from OR for routine post-op      Report consisted of patient's Situation, Background, Assessment and   Recommendations(SBAR).     Information from the following report(s) Nurse Handoff Report, Surgery Report, Intake/Output, and MAR was reviewed with the receiving nurse.    Opportunity for questions and clarification was provided.      Assessment completed upon patient's arrival to unit and care assumed.

## 2024-04-26 NOTE — PERIOP NOTE
TRANSFER - IN REPORT:    Verbal report received from Fredis on Rui Jauregui  being received from PACU for routine progression of patient care      Report consisted of patient's Situation, Background, Assessment and   Recommendations(SBAR).     Information from the following report(s) Intake/Output and MAR was reviewed with the receiving nurse.    Opportunity for questions and clarification was provided.      Assessment completed upon patient's arrival to unit and care assumed.

## 2024-04-26 NOTE — OP NOTE
Operative Note      Patient: Rui Jauregui  YOB: 1990  MRN: [id number]    Date of Procedure: 4/26/2024    Pre-Op Diagnosis Codes:     * Right foot pain [M79.671]     * Bunion, right foot [M21.611]    Post-Op Diagnosis: Same       Procedure(s):  RIGHT FOOT BUNIONECTOMY WITH  ANGEL OSTEOTOMY WITH MINI C-ARM    Surgeon(s):  Esdras Blank DPM    Assistant:   * No surgical staff found *    Anesthesia: General    Estimated Blood Loss (mL): Less than 100cc    Complications: None    Specimens:   * No specimens in log *    Implants:  Implant Name Type Inv. Item Serial No.  Lot No. LRB No. Used Action   BEVELED FT SCREW [id number] - [id number]  BEVELED FT SCREW 4X54MM  [employer] [id number] Right 1 Implanted   BEVELED FT SCREW 4X38MM - [id number]  BEVELED FT SCREW 4X38MM  [employer] [id number] Right 1 Implanted   SCREW BNE L30MM DIA2.5MM KEZIA FT ANK TI SELF DRL ST CHRISTIAN - [id number]  SCREW BNE L30MM DIA2.5MM KEZIA FT ANK TI SELF DRL ST CHRISTIAN  [employer] [id number] Right 1 Implanted         Drains: * No LDAs found *    Findings:  Infection Present At Time Of Surgery (PATOS) (choose all levels that have infection present):  No infection present  Other Findings: none    Detailed Description of Procedure:   The patient was brought into the operating room and secured on the operative table in the supine position.  A peripheral nerve block was performed.  The lower extremity was then scrubbed, prepped, and draped in the usual sterile fashion.     Attention was then directed to the right foot bunion.  The first metatarsal neck was identified and marked with skin marker.  Next a small stab incision was made at this area down to osseous tissue.  Periosteal elevator was used to free the periosteum in preparation for osteotomy.  Next using the fara and intraoperative C arm, an osteotomy was made at the level of the first metatarsal neck.  This was mobilized and was able to be freely moved, rotated, and

## 2024-04-26 NOTE — PERIOP NOTE
TRANSFER - OUT REPORT:    Verbal report given to Leland HADLEY on Rui Jauregui  being transferred to Phase II for routine progression of patient care       Report consisted of patient's Situation, Background, Assessment and   Recommendations(SBAR).     Information from the following report(s) Nurse Handoff Report, Surgery Report, and MAR was reviewed with the receiving nurse.           Lines:       Opportunity for questions and clarification was provided.      Patient transported with:  Registered Nurse

## 2024-04-26 NOTE — PERIOP NOTE
Patient reports sore throat and has a cough producing thin pink tinged secretions, Dr. Batres notified. Dr. Batres bedside, nebulizer treatment offered and declined, patient states he would like to go home

## 2024-04-26 NOTE — ANESTHESIA POSTPROCEDURE EVALUATION
Department of Anesthesiology  Postprocedure Note    Patient: Rui Jauregui  MRN: [id number]  YOB: 1990  Date of evaluation: 4/26/2024    Procedure Summary       Date: 04/26/24 Room / Location: [facility] MAIN 05 / [facility] MAIN OR    Anesthesia Start: 1241 Anesthesia Stop: 1504    Procedure: RIGHT FOOT BUNIONECTOMY WITH  ANGEL OSTEOTOMY WITH MINI C-ARM (Right: Foot) Diagnosis:       Right foot pain      Bunion, right foot      (Right foot pain [M79.671])      (Bunion, right foot [M21.611])    Surgeons: Esdras Blank DPM Responsible Provider: Khang Batres MD    Anesthesia Type: MAC, General ASA Status: 3            Anesthesia Type: MAC, General    Phillip Phase I: Phillip Score: 9    Phillip Phase II: Phillip Score: 10    Anesthesia Post Evaluation    Patient location during evaluation: bedside  Patient participation: complete - patient participated  Level of consciousness: awake and alert  Pain score: 0  Airway patency: patent  Nausea & Vomiting: no nausea  Cardiovascular status: blood pressure returned to baseline and hemodynamically stable  Respiratory status: acceptable  Hydration status: euvolemic  Pain management: adequate    No notable events documented.

## 2024-06-29 ENCOUNTER — HOSPITAL ENCOUNTER (EMERGENCY)
Facility: HOSPITAL | Age: 34
Discharge: HOME | End: 2024-06-30
Attending: STUDENT IN AN ORGANIZED HEALTH CARE EDUCATION/TRAINING PROGRAM
Payer: MEDICAID

## 2024-06-29 DIAGNOSIS — R10.33 PERIUMBILICAL ABDOMINAL PAIN: Primary | ICD-10-CM

## 2024-06-29 LAB
ALBUMIN SERPL-MCNC: 3.9 G/DL (ref 3.4–5)
ALBUMIN/GLOB SERPL: 1 (ref 0.8–1.7)
ALP SERPL-CCNC: 74 U/L (ref 45–117)
ALT SERPL-CCNC: 29 U/L (ref 16–61)
ANION GAP SERPL CALC-SCNC: 4 MMOL/L (ref 3–18)
AST SERPL-CCNC: 19 U/L (ref 10–38)
BASOPHILS # BLD: 0 K/UL (ref 0–0.1)
BASOPHILS NFR BLD: 0 % (ref 0–2)
BILIRUB SERPL-MCNC: 0.5 MG/DL (ref 0.2–1)
BUN SERPL-MCNC: 12 MG/DL (ref 7–18)
BUN/CREAT SERPL: 10 (ref 12–20)
CALCIUM SERPL-MCNC: 9 MG/DL (ref 8.5–10.1)
CHLORIDE SERPL-SCNC: 104 MMOL/L (ref 100–111)
CO2 SERPL-SCNC: 28 MMOL/L (ref 21–32)
CREAT SERPL-MCNC: 1.18 MG/DL (ref 0.6–1.3)
DIFFERENTIAL METHOD BLD: ABNORMAL
EOSINOPHIL # BLD: 0.2 K/UL (ref 0–0.4)
EOSINOPHIL NFR BLD: 3 % (ref 0–5)
ERYTHROCYTE [DISTWIDTH] IN BLOOD BY AUTOMATED COUNT: 13.1 % (ref 11.6–14.5)
GLOBULIN SER CALC-MCNC: 4.1 G/DL (ref 2–4)
GLUCOSE SERPL-MCNC: 91 MG/DL (ref 74–99)
HCT VFR BLD AUTO: 42.8 % (ref 36–48)
HGB BLD-MCNC: 13.8 G/DL (ref 13–16)
IMM GRANULOCYTES # BLD AUTO: 0 K/UL (ref 0–0.04)
IMM GRANULOCYTES NFR BLD AUTO: 0 % (ref 0–0.5)
LIPASE SERPL-CCNC: 61 U/L (ref 13–75)
LYMPHOCYTES # BLD: 2.7 K/UL (ref 0.9–3.6)
LYMPHOCYTES NFR BLD: 50 % (ref 21–52)
MCH RBC QN AUTO: 28 PG (ref 24–34)
MCHC RBC AUTO-ENTMCNC: 32.2 G/DL (ref 31–37)
MCV RBC AUTO: 87 FL (ref 78–100)
MONOCYTES # BLD: 0.5 K/UL (ref 0.05–1.2)
MONOCYTES NFR BLD: 10 % (ref 3–10)
NEUTS SEG # BLD: 2 K/UL (ref 1.8–8)
NEUTS SEG NFR BLD: 37 % (ref 40–73)
NRBC # BLD: 0 K/UL (ref 0–0.01)
NRBC BLD-RTO: 0 PER 100 WBC
PLATELET # BLD AUTO: 263 K/UL (ref 135–420)
PMV BLD AUTO: 10.4 FL (ref 9.2–11.8)
POTASSIUM SERPL-SCNC: 3.9 MMOL/L (ref 3.5–5.5)
PROT SERPL-MCNC: 8 G/DL (ref 6.4–8.2)
RBC # BLD AUTO: 4.92 M/UL (ref 4.35–5.65)
SODIUM SERPL-SCNC: 136 MMOL/L (ref 136–145)
WBC # BLD AUTO: 5.3 K/UL (ref 4.6–13.2)

## 2024-06-29 PROCEDURE — 83690 ASSAY OF LIPASE: CPT

## 2024-06-29 PROCEDURE — 99283 EMERGENCY DEPT VISIT LOW MDM: CPT

## 2024-06-29 PROCEDURE — 85025 COMPLETE CBC W/AUTO DIFF WBC: CPT

## 2024-06-29 PROCEDURE — 6370000000 HC RX 637 (ALT 250 FOR IP): Performed by: STUDENT IN AN ORGANIZED HEALTH CARE EDUCATION/TRAINING PROGRAM

## 2024-06-29 PROCEDURE — 80053 COMPREHEN METABOLIC PANEL: CPT

## 2024-06-29 RX ORDER — ONDANSETRON 4 MG/1
4 TABLET, ORALLY DISINTEGRATING ORAL ONCE
Status: COMPLETED | OUTPATIENT
Start: 2024-06-29 | End: 2024-06-29

## 2024-06-29 RX ORDER — DICYCLOMINE HYDROCHLORIDE 10 MG/1
20 CAPSULE ORAL
Status: COMPLETED | OUTPATIENT
Start: 2024-06-29 | End: 2024-06-29

## 2024-06-29 RX ADMIN — DICYCLOMINE HYDROCHLORIDE 20 MG: 10 CAPSULE ORAL at 23:18

## 2024-06-29 RX ADMIN — ALUMINUM HYDROXIDE AND MAGNESIUM HYDROXIDE 30 ML: 200; 200 SUSPENSION ORAL at 23:18

## 2024-06-29 RX ADMIN — ONDANSETRON 4 MG: 4 TABLET, ORALLY DISINTEGRATING ORAL at 23:18

## 2024-06-29 ASSESSMENT — PAIN SCALES - GENERAL: PAINLEVEL_OUTOF10: 9

## 2024-06-29 ASSESSMENT — PAIN - FUNCTIONAL ASSESSMENT: PAIN_FUNCTIONAL_ASSESSMENT: 0-10

## 2024-06-30 VITALS
HEIGHT: 71 IN | WEIGHT: 315 LBS | RESPIRATION RATE: 15 BRPM | DIASTOLIC BLOOD PRESSURE: 76 MMHG | OXYGEN SATURATION: 100 % | HEART RATE: 74 BPM | SYSTOLIC BLOOD PRESSURE: 144 MMHG | BODY MASS INDEX: 44.1 KG/M2 | TEMPERATURE: 98.5 F

## 2024-06-30 RX ORDER — ONDANSETRON 4 MG/1
4 TABLET, ORALLY DISINTEGRATING ORAL 3 TIMES DAILY PRN
Qty: 21 TABLET | Refills: 0 | Status: SHIPPED | OUTPATIENT
Start: 2024-06-30

## 2024-06-30 RX ORDER — DICYCLOMINE HCL 20 MG
20 TABLET ORAL 4 TIMES DAILY
Qty: 28 TABLET | Refills: 0 | Status: SHIPPED | OUTPATIENT
Start: 2024-06-30 | End: 2024-07-07

## 2024-06-30 NOTE — DISCHARGE INSTRUCTIONS
You were evaluated for abdominal pain .  Based on your work-up it was deemed that she was stable for discharge.  Please  your medication of zofran and bentyl which was prescribed to you.  Please follow-up with your primary care physician if you have any further concerns and go over your work-up.  If you experience any chest pain, shortness of breath, worsening abdominal pain, vomiting blood, worsening headache, seizures, or any worsening of your symptoms please return to the emergency department immediately.  If you have any pending results or any further questions please contact the emergency department at [phone]

## 2024-06-30 NOTE — ED PROVIDER NOTES
Course: Progress Notes, Reevaluation, and Consults:    Provider Notes (Medical Decision Making):     MDM  33-year-old male who presents abdominal pain.  High suspicion for enteritis.  Consider colitis.  Low suspicion for SBO as patient has no history of intra-abdominal surgeries.  Low suspicion for acute cystitis.  Will obtain labs and provide symptomatic control.  Patient with no signs of metabolic derangement.  Patient given GI cocktail.  Patient's pain has improved.  Discussed workup with patient.  He agrees with this plan.  Patient will be discharged.         Patient was given the following medications:  Medications   ondansetron (ZOFRAN-ODT) disintegrating tablet 4 mg (4 mg SubLINGual Given 6/29/24 2318)   aluminum-magnesium hydroxide 200-200 MG/5ML suspension 30 mL (30 mLs Oral Given 6/29/24 2318)   dicyclomine (BENTYL) capsule 20 mg (20 mg Oral Given 6/29/24 2318)       CONSULTS: (Who and What was discussed)  None    Chronic Conditions: none     Social Determinants affecting Dx or Tx: None    Records Reviewed (source and summary of external notes):  none     Procedures    Critical Care Time: none     SCREENING TOOLS:  None    CLINICAL MANAGEMENT TOOLS:  Not Applicable      Diagnosis     Clinical Impression:   1. Periumbilical abdominal pain        Disposition: home     Gisele Mckee, [facility] - [facility]  [address] [place] [id number] [phone]    Go to   As needed, If symptoms worsen       Disclaimer: Sections of this note are dictated using utilizing voice recognition software.  Minor typographical errors may be present. If questions arise, please do not hesitate to contact me or call our department.          Moris La MD  06/30/24 0012

## 2024-07-07 ENCOUNTER — APPOINTMENT (OUTPATIENT)
Facility: HOSPITAL | Age: 34
End: 2024-07-07
Payer: MEDICAID

## 2024-07-07 ENCOUNTER — HOSPITAL ENCOUNTER (EMERGENCY)
Facility: HOSPITAL | Age: 34
Discharge: HOME | End: 2024-07-07
Payer: MEDICAID

## 2024-07-07 VITALS
RESPIRATION RATE: 16 BRPM | WEIGHT: 307 LBS | BODY MASS INDEX: 42.98 KG/M2 | TEMPERATURE: 98 F | OXYGEN SATURATION: 100 % | HEART RATE: 80 BPM | DIASTOLIC BLOOD PRESSURE: 88 MMHG | HEIGHT: 71 IN | SYSTOLIC BLOOD PRESSURE: 135 MMHG

## 2024-07-07 DIAGNOSIS — R10.84 GENERALIZED ABDOMINAL PAIN: ICD-10-CM

## 2024-07-07 DIAGNOSIS — R19.7 DIARRHEA, UNSPECIFIED TYPE: Primary | ICD-10-CM

## 2024-07-07 LAB
ALBUMIN SERPL-MCNC: 3.7 G/DL (ref 3.4–5)
ALBUMIN/GLOB SERPL: 0.8 (ref 0.8–1.7)
ALP SERPL-CCNC: 77 U/L (ref 45–117)
ALT SERPL-CCNC: 31 U/L (ref 16–61)
ANION GAP SERPL CALC-SCNC: 5 MMOL/L (ref 3–18)
AST SERPL-CCNC: 23 U/L (ref 10–38)
BASOPHILS # BLD: 0.1 K/UL (ref 0–0.1)
BASOPHILS NFR BLD: 1 % (ref 0–2)
BILIRUB SERPL-MCNC: 0.4 MG/DL (ref 0.2–1)
BUN SERPL-MCNC: 9 MG/DL (ref 7–18)
BUN/CREAT SERPL: 8 (ref 12–20)
CALCIUM SERPL-MCNC: 9 MG/DL (ref 8.5–10.1)
CHLORIDE SERPL-SCNC: 103 MMOL/L (ref 100–111)
CO2 SERPL-SCNC: 28 MMOL/L (ref 21–32)
CREAT SERPL-MCNC: 1.19 MG/DL (ref 0.6–1.3)
DIFFERENTIAL METHOD BLD: ABNORMAL
EOSINOPHIL # BLD: 0.1 K/UL (ref 0–0.4)
EOSINOPHIL NFR BLD: 3 % (ref 0–5)
ERYTHROCYTE [DISTWIDTH] IN BLOOD BY AUTOMATED COUNT: 13.2 % (ref 11.6–14.5)
GLOBULIN SER CALC-MCNC: 4.5 G/DL (ref 2–4)
GLUCOSE SERPL-MCNC: 92 MG/DL (ref 74–99)
HCT VFR BLD AUTO: 42.5 % (ref 36–48)
HGB BLD-MCNC: 13.7 G/DL (ref 13–16)
IMM GRANULOCYTES # BLD AUTO: 0 K/UL (ref 0–0.04)
IMM GRANULOCYTES NFR BLD AUTO: 1 % (ref 0–0.5)
LIPASE SERPL-CCNC: 55 U/L (ref 13–75)
LYMPHOCYTES # BLD: 2.1 K/UL (ref 0.9–3.6)
LYMPHOCYTES NFR BLD: 41 % (ref 21–52)
MCH RBC QN AUTO: 27.9 PG (ref 24–34)
MCHC RBC AUTO-ENTMCNC: 32.2 G/DL (ref 31–37)
MCV RBC AUTO: 86.6 FL (ref 78–100)
MONOCYTES # BLD: 0.5 K/UL (ref 0.05–1.2)
MONOCYTES NFR BLD: 10 % (ref 3–10)
NEUTS SEG # BLD: 2.3 K/UL (ref 1.8–8)
NEUTS SEG NFR BLD: 45 % (ref 40–73)
NRBC # BLD: 0 K/UL (ref 0–0.01)
NRBC BLD-RTO: 0 PER 100 WBC
PLATELET # BLD AUTO: 288 K/UL (ref 135–420)
PMV BLD AUTO: 10.7 FL (ref 9.2–11.8)
POTASSIUM SERPL-SCNC: 3.6 MMOL/L (ref 3.5–5.5)
PROT SERPL-MCNC: 8.2 G/DL (ref 6.4–8.2)
RBC # BLD AUTO: 4.91 M/UL (ref 4.35–5.65)
SODIUM SERPL-SCNC: 136 MMOL/L (ref 136–145)
WBC # BLD AUTO: 5.1 K/UL (ref 4.6–13.2)

## 2024-07-07 PROCEDURE — 85025 COMPLETE CBC W/AUTO DIFF WBC: CPT

## 2024-07-07 PROCEDURE — 99285 EMERGENCY DEPT VISIT HI MDM: CPT

## 2024-07-07 PROCEDURE — 6360000004 HC RX CONTRAST MEDICATION: Performed by: PHYSICIAN ASSISTANT

## 2024-07-07 PROCEDURE — 83690 ASSAY OF LIPASE: CPT

## 2024-07-07 PROCEDURE — 74177 CT ABD & PELVIS W/CONTRAST: CPT

## 2024-07-07 PROCEDURE — 80053 COMPREHEN METABOLIC PANEL: CPT

## 2024-07-07 RX ORDER — ONDANSETRON 4 MG/1
4 TABLET, FILM COATED ORAL 3 TIMES DAILY PRN
Qty: 15 TABLET | Refills: 0 | Status: SHIPPED | OUTPATIENT
Start: 2024-07-07

## 2024-07-07 RX ADMIN — IOPAMIDOL 100 ML: 612 INJECTION, SOLUTION INTRAVENOUS at 16:57

## 2024-07-07 ASSESSMENT — PAIN DESCRIPTION - FREQUENCY: FREQUENCY: INTERMITTENT

## 2024-07-07 ASSESSMENT — PAIN DESCRIPTION - PAIN TYPE: TYPE: CHRONIC PAIN

## 2024-07-07 ASSESSMENT — PAIN DESCRIPTION - LOCATION: LOCATION: ABDOMEN

## 2024-07-07 ASSESSMENT — PAIN DESCRIPTION - DESCRIPTORS: DESCRIPTORS: ACHING

## 2024-07-07 ASSESSMENT — PAIN DESCRIPTION - ORIENTATION: ORIENTATION: MID

## 2024-07-07 ASSESSMENT — PAIN - FUNCTIONAL ASSESSMENT: PAIN_FUNCTIONAL_ASSESSMENT: 0-10

## 2024-07-07 ASSESSMENT — PAIN SCALES - GENERAL: PAINLEVEL_OUTOF10: 8

## 2024-07-07 NOTE — ED PROVIDER NOTES
homophones, and other interpretive errors are inadvertently transcribed by the computer software.  Please disregard these errors.  Please excuse any errors that have escaped final proofreading.       Roseanne Meadows PA-C  07/07/24 9867

## 2024-07-07 NOTE — ED TRIAGE NOTES
Patient reports he has vomiting. Reports they prescribed the zofran but the pharmacy and they did not having. States he missed work

## 2024-07-07 NOTE — DISCHARGE INSTRUCTIONS
Your blood work and CAT scan with no concerning findings today  You can use Imodium, over-the-counter medication to help with diarrhea  Zofran can also help with nausea and diarrhea  Stay well-hydrated  Healthy diet  Call your doctor tomorrow for early follow-up  You are unable to produce stool for stool studies, discussed that with your doctor she may want to obtain those studies  Return to ER for any new or worsening symptoms or new concerns

## 2025-05-27 ENCOUNTER — HOSPITAL ENCOUNTER (EMERGENCY)
Facility: HOSPITAL | Age: 35
Discharge: HOME | End: 2025-05-27

## 2025-05-27 ENCOUNTER — APPOINTMENT (OUTPATIENT)
Facility: HOSPITAL | Age: 35
End: 2025-05-27

## 2025-05-27 VITALS
WEIGHT: 280 LBS | HEART RATE: 88 BPM | HEIGHT: 71 IN | SYSTOLIC BLOOD PRESSURE: 129 MMHG | DIASTOLIC BLOOD PRESSURE: 83 MMHG | OXYGEN SATURATION: 100 % | BODY MASS INDEX: 39.2 KG/M2 | TEMPERATURE: 97.5 F | RESPIRATION RATE: 16 BRPM

## 2025-05-27 DIAGNOSIS — R07.89 CHEST WALL PAIN: ICD-10-CM
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
## 2025-05-27 DIAGNOSIS — S16.1XXA STRAIN OF NECK MUSCLE, INITIAL ENCOUNTER: ICD-10-CM

## 2025-05-27 DIAGNOSIS — S46.912A STRAIN OF LEFT SHOULDER, INITIAL ENCOUNTER: ICD-10-CM

## 2025-05-27 LAB
EKG DIAGNOSIS: NORMAL
EKG P AXIS: 68 DEGREES
EKG P-R INTERVAL: 164 MS
EKG Q-T INTERVAL: 408 MS
EKG QRS DURATION: 114 MS
EKG QTC CALCULATION (BAZETT): 456 MS
EKG R AXIS: 41 DEGREES
EKG T AXIS: 52 DEGREES
EKG VENTRICULAR RATE: 75 BPM

## 2025-05-27 PROCEDURE — 71101 X-RAY EXAM UNILAT RIBS/CHEST: CPT

## 2025-05-27 PROCEDURE — 96372 THER/PROPH/DIAG INJ SC/IM: CPT

## 2025-05-27 PROCEDURE — 93005 ELECTROCARDIOGRAM TRACING: CPT | Performed by: NURSE PRACTITIONER

## 2025-05-27 PROCEDURE — 6360000002 HC RX W HCPCS: Performed by: NURSE PRACTITIONER

## 2025-05-27 PROCEDURE — 99284 EMERGENCY DEPT VISIT MOD MDM: CPT

## 2025-05-27 PROCEDURE — 73030 X-RAY EXAM OF SHOULDER: CPT

## 2025-05-27 RX ORDER — CYCLOBENZAPRINE HCL 10 MG
10 TABLET ORAL 3 TIMES DAILY PRN
Qty: 21 TABLET | Refills: 0 | Status: SHIPPED | OUTPATIENT
Start: 2025-05-27 | End: 2025-06-06

## 2025-05-27 RX ORDER — KETOROLAC TROMETHAMINE 30 MG/ML
30 INJECTION, SOLUTION INTRAMUSCULAR; INTRAVENOUS
Status: COMPLETED | OUTPATIENT
Start: 2025-05-27 | End: 2025-05-27

## 2025-05-27 RX ADMIN — KETOROLAC TROMETHAMINE 30 MG: 30 INJECTION, SOLUTION INTRAMUSCULAR at 12:25
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
## 2025-05-27 NOTE — DISCHARGE INSTRUCTIONS
Flexeril as prescribed for muscle spasms.  May cause sedation, do not take with alcohol or drive while taking this medication.  May take over-the-counter ibuprofen or naproxen according to package directions.  May use ice (do not place directly on skin) up to 20 minutes each hour  Follow-up with primary care provider in 1 week if symptoms are not improving.  Return to care for new or worsening symptoms to include weakness, changes in sensation, difficulty walking, numbness to groin or buttocks, loss of control of bladder or bowels or concerning symptoms.

## (undated) DEVICE — GLOVE ORANGE PI 7 1/2   MSG9075

## (undated) DEVICE — 4.0MM EGG BUR

## (undated) DEVICE — Device

## (undated) DEVICE — CURITY NON-ADHERENT STRIPS: Brand: CURITY

## (undated) DEVICE — DRAPE CARMXRAY MINI MOB ADH STRP STRL SURG OEC MINIVIEW 6800

## (undated) DEVICE — GARMENT,MEDLINE,DVT,INT,CALF,MED, GEN2: Brand: MEDLINE

## (undated) DEVICE — SYRINGE MED 10ML LUERLOCK TIP W/O SFTY DISP

## (undated) DEVICE — CUTTER ENDOSCP L340MM LIN ARTC SGL STROKE FIRING ENDOPATH

## (undated) DEVICE — SPONGE GZ W4XL4IN COT 12 PLY TYP VII WVN C FLD DSGN STERILE

## (undated) DEVICE — NEEDLE INSUF L120MM DIA2MM DISP FOR PNEUMOPERI ENDOPATH

## (undated) DEVICE — TISSUE RETRIEVAL SYSTEM: Brand: INZII RETRIEVAL SYSTEM

## (undated) DEVICE — BANDAGE COMPR W4INXL10YD WHITE/BEIGE E MTRX HK LOOP CLSR

## (undated) DEVICE — SLEEVE TRCR L100MM DIA5MM UNIV STBL FOR BLDELSS DIL TIP

## (undated) DEVICE — BANDAGE GZ W2XL75IN ST RAYON POLY CNFRM STRTCH LTWT

## (undated) DEVICE — PACK PROCEDURE SURG EXTREMITY CUST

## (undated) DEVICE — RELOAD STPL SZ 0 L45MM DIA3.5MM 0DEG STD REG TISS BLU TI

## (undated) DEVICE — NEEDLE 25GA X 1.5 IN ECLIPSE

## (undated) DEVICE — PADDING,UNDERCAST,COTTON, 4"X4YD STERILE: Brand: MEDLINE

## (undated) DEVICE — GOWN,SIRUS,NONRNF,SETINSLV,XL,20/CS: Brand: MEDLINE

## (undated) DEVICE — SHEARS ENDOSCP L36CM DIA5MM ULTRASONIC CRV TIP W/ ADV

## (undated) DEVICE — BLUNTFILL: Brand: MONOJECT

## (undated) DEVICE — (D)STRIP SKN CLSR 0.5X4IN WHT --

## (undated) DEVICE — APPLICATOR FBR TIP L6IN COT TIP WOOD SHFT SWAB 2000 PER CA

## (undated) DEVICE — TROCAR ENDOSCP SHFT L100MM DIA5MM DIL TIP ENDOPATH XCEL

## (undated) DEVICE — CATHETER IV 14GA L1.75IN OD2.146MM ID1.740MM ORNG VIALON

## (undated) DEVICE — SUTURE MONOCRYL SZ 3-0 L27IN ABSRB UD L26MM SH 1/2 CIR Y416H

## (undated) DEVICE — SOL IRRIGATION INJ NACL 0.9% 500ML BTL

## (undated) DEVICE — DISPOSABLE SUCTION/IRRIGATOR TUBE SET WITH TIP: Brand: AHTO

## (undated) DEVICE — MASTISOL ADHESIVE LIQ 2/3ML

## (undated) DEVICE — SUTURE MONOCRYL SZ 5-0 L18IN ABSRB UD L19MM PS-2 3/8 CIR PRIM Y495G

## (undated) DEVICE — KENDALL SCD EXPRESS SLEEVES, KNEE LENGTH, MEDIUM: Brand: KENDALL SCD

## (undated) DEVICE — BANDAGE COBAN 4 IN COMPR W4INXL5YD FOAM COHESIVE QUIK STK SELF ADH SFT

## (undated) DEVICE — SHEET,DRAPE,70X100,STERILE: Brand: MEDLINE

## (undated) DEVICE — SUT MONOCRYL PLUS UD 4-0 --

## (undated) DEVICE — SUTURE MONOCRYL SZ 2-0 L27IN ABSRB VLT CT-2 L26MM 1/2 CIR Y333H

## (undated) DEVICE — APPLICATOR MEDICATED 26 CC SOLUTION HI LT ORNG CHLORAPREP

## (undated) DEVICE — 2.0 DRILL BIT

## (undated) DEVICE — BNDG,ELSTC,MATRIX,STRL,6"X5YD,LF,HOOK&LP: Brand: MEDLINE

## (undated) DEVICE — ENDOLOOP SUT LIGATURE 18IN -- 12/BX PDS II

## (undated) DEVICE — SUT VCRL + 0 36IN UR6 VIO --

## (undated) DEVICE — ELECTRODE PT RET AD L9FT HI MOIST COND ADH HYDRGEL CORDED

## (undated) DEVICE — COVADERM PLUS: Brand: DEROYAL

## (undated) DEVICE — ZIMMER® STERILE DISPOSABLE TOURNIQUET CUFF WITH PROTECTIVE SLEEVE AND PLC, SINGLE PORT, SINGLE BLADDER, 18 IN. (46 CM)

## (undated) DEVICE — 3M™ TEGADERM™ TRANSPARENT FILM DRESSING FRAME STYLE, 1627, 4 IN X 10 IN (10 CM X 25 CM), 20/CT 4CT/CASE: Brand: 3M™ TEGADERM™

## (undated) DEVICE — PRECISION (9.0 X 0.51 X 25.0MM)

## (undated) DEVICE — BLADE,CARBON-STEEL,15,STRL,DISPOSABLE,TB: Brand: MEDLINE

## (undated) DEVICE — IRRIGATION TUBING

## (undated) DEVICE — DRESSING,GAUZE,XEROFORM,CURAD,1"X8",ST: Brand: CURAD

## (undated) DEVICE — TROCAR ENDOSCP L100MM DIA12MM DIL TIP STBL SL ENDOPATH XCEL

## (undated) DEVICE — STERILE POLYISOPRENE POWDER-FREE SURGICAL GLOVES WITH EMOLLIENT COATING: Brand: PROTEXIS

## (undated) DEVICE — 3L THIN WALL CAN: Brand: CRD

## (undated) DEVICE — TOWEL,OR,DSP,ST,BLUE,STD,4/PK,20PK/CS: Brand: MEDLINE

## (undated) DEVICE — STRIP,CLOSURE,WOUND,MEDI-STRIP,1/2X4: Brand: MEDLINE